# Patient Record
Sex: FEMALE | NOT HISPANIC OR LATINO | Employment: OTHER | URBAN - METROPOLITAN AREA
[De-identification: names, ages, dates, MRNs, and addresses within clinical notes are randomized per-mention and may not be internally consistent; named-entity substitution may affect disease eponyms.]

---

## 2017-01-04 ENCOUNTER — APPOINTMENT (OUTPATIENT)
Dept: GENERAL RADIOLOGY | Facility: CLINIC | Age: 67
End: 2017-01-04
Attending: EMERGENCY MEDICINE
Payer: MEDICARE

## 2017-01-04 ENCOUNTER — HOSPITAL ENCOUNTER (OUTPATIENT)
Facility: CLINIC | Age: 67
Setting detail: OBSERVATION
Discharge: HOME | End: 2017-01-06
Attending: EMERGENCY MEDICINE | Admitting: HOSPITALIST
Payer: MEDICARE

## 2017-01-04 DIAGNOSIS — I47.10 PAROXYSMAL SUPRAVENTRICULAR TACHYCARDIA (H): ICD-10-CM

## 2017-01-04 DIAGNOSIS — R91.8 PULMONARY NODULES: ICD-10-CM

## 2017-01-04 PROBLEM — R00.0 TACHYCARDIA: Status: ACTIVE | Noted: 2017-01-04

## 2017-01-04 LAB
ALBUMIN UR-MCNC: NEGATIVE MG/DL
ANION GAP SERPL CALCULATED.3IONS-SCNC: 11 MMOL/L (ref 3–14)
APPEARANCE UR: CLEAR
BACTERIA SPEC CULT: NORMAL
BASOPHILS # BLD AUTO: 0 10E9/L (ref 0–0.2)
BASOPHILS NFR BLD AUTO: 0.4 %
BILIRUB UR QL STRIP: NEGATIVE
BUN SERPL-MCNC: 15 MG/DL (ref 7–30)
CALCIUM SERPL-MCNC: 9.1 MG/DL (ref 8.5–10.1)
CHLORIDE SERPL-SCNC: 107 MMOL/L (ref 94–109)
CO2 SERPL-SCNC: 24 MMOL/L (ref 20–32)
COLOR UR AUTO: ABNORMAL
CREAT SERPL-MCNC: 0.88 MG/DL (ref 0.52–1.04)
D DIMER PPP FEU-MCNC: 0.3 UG/ML FEU (ref 0–0.5)
DIFFERENTIAL METHOD BLD: NORMAL
EOSINOPHIL # BLD AUTO: 0.1 10E9/L (ref 0–0.7)
EOSINOPHIL NFR BLD AUTO: 0.9 %
ERYTHROCYTE [DISTWIDTH] IN BLOOD BY AUTOMATED COUNT: 13.2 % (ref 10–15)
GFR SERPL CREATININE-BSD FRML MDRD: 64 ML/MIN/1.7M2
GLUCOSE SERPL-MCNC: 111 MG/DL (ref 70–99)
GLUCOSE UR STRIP-MCNC: NEGATIVE MG/DL
HCT VFR BLD AUTO: 45.6 % (ref 35–47)
HGB BLD-MCNC: 15.5 G/DL (ref 11.7–15.7)
HGB UR QL STRIP: NEGATIVE
IMM GRANULOCYTES # BLD: 0 10E9/L (ref 0–0.4)
IMM GRANULOCYTES NFR BLD: 0.3 %
INTERPRETATION ECG - MUSE: NORMAL
INTERPRETATION ECG - MUSE: NORMAL
KETONES UR STRIP-MCNC: NEGATIVE MG/DL
LEUKOCYTE ESTERASE UR QL STRIP: ABNORMAL
LYMPHOCYTES # BLD AUTO: 2.4 10E9/L (ref 0.8–5.3)
LYMPHOCYTES NFR BLD AUTO: 26.3 %
MAGNESIUM SERPL-MCNC: 2.1 MG/DL (ref 1.6–2.3)
MCH RBC QN AUTO: 30.6 PG (ref 26.5–33)
MCHC RBC AUTO-ENTMCNC: 34 G/DL (ref 31.5–36.5)
MCV RBC AUTO: 90 FL (ref 78–100)
MICRO REPORT STATUS: NORMAL
MONOCYTES # BLD AUTO: 0.4 10E9/L (ref 0–1.3)
MONOCYTES NFR BLD AUTO: 4 %
NEUTROPHILS # BLD AUTO: 6.1 10E9/L (ref 1.6–8.3)
NEUTROPHILS NFR BLD AUTO: 68.1 %
NITRATE UR QL: NEGATIVE
NRBC # BLD AUTO: 0 10*3/UL
NRBC BLD AUTO-RTO: 0 /100
NT-PROBNP SERPL-MCNC: 151 PG/ML (ref 0–900)
PH UR STRIP: 7 PH (ref 5–7)
PLATELET # BLD AUTO: 309 10E9/L (ref 150–450)
POTASSIUM SERPL-SCNC: 3.7 MMOL/L (ref 3.4–5.3)
RBC # BLD AUTO: 5.06 10E12/L (ref 3.8–5.2)
RBC #/AREA URNS AUTO: 3 /HPF (ref 0–2)
SODIUM SERPL-SCNC: 142 MMOL/L (ref 133–144)
SP GR UR STRIP: 1 (ref 1–1.03)
SPECIMEN SOURCE: NORMAL
SQUAMOUS #/AREA URNS AUTO: <1 /HPF (ref 0–1)
TROPONIN I SERPL-MCNC: NORMAL UG/L (ref 0–0.04)
TROPONIN I SERPL-MCNC: NORMAL UG/L (ref 0–0.04)
TSH SERPL DL<=0.05 MIU/L-ACNC: 1.2 MU/L (ref 0.4–4)
URN SPEC COLLECT METH UR: ABNORMAL
UROBILINOGEN UR STRIP-MCNC: NORMAL MG/DL (ref 0–2)
WBC # BLD AUTO: 9 10E9/L (ref 4–11)
WBC #/AREA URNS AUTO: 8 /HPF (ref 0–2)

## 2017-01-04 PROCEDURE — 84443 ASSAY THYROID STIM HORMONE: CPT | Performed by: EMERGENCY MEDICINE

## 2017-01-04 PROCEDURE — 83880 ASSAY OF NATRIURETIC PEPTIDE: CPT | Performed by: EMERGENCY MEDICINE

## 2017-01-04 PROCEDURE — 25000128 H RX IP 250 OP 636: Performed by: EMERGENCY MEDICINE

## 2017-01-04 PROCEDURE — 83735 ASSAY OF MAGNESIUM: CPT | Performed by: EMERGENCY MEDICINE

## 2017-01-04 PROCEDURE — 81001 URINALYSIS AUTO W/SCOPE: CPT | Performed by: EMERGENCY MEDICINE

## 2017-01-04 PROCEDURE — 25000125 ZZHC RX 250: Performed by: EMERGENCY MEDICINE

## 2017-01-04 PROCEDURE — 96374 THER/PROPH/DIAG INJ IV PUSH: CPT

## 2017-01-04 PROCEDURE — 96361 HYDRATE IV INFUSION ADD-ON: CPT

## 2017-01-04 PROCEDURE — 85025 COMPLETE CBC W/AUTO DIFF WBC: CPT | Performed by: EMERGENCY MEDICINE

## 2017-01-04 PROCEDURE — G0378 HOSPITAL OBSERVATION PER HR: HCPCS

## 2017-01-04 PROCEDURE — 36415 COLL VENOUS BLD VENIPUNCTURE: CPT | Performed by: PHYSICIAN ASSISTANT

## 2017-01-04 PROCEDURE — 71020 XR CHEST 2 VW: CPT

## 2017-01-04 PROCEDURE — 80048 BASIC METABOLIC PNL TOTAL CA: CPT | Performed by: EMERGENCY MEDICINE

## 2017-01-04 PROCEDURE — 84484 ASSAY OF TROPONIN QUANT: CPT | Performed by: EMERGENCY MEDICINE

## 2017-01-04 PROCEDURE — 25000132 ZZH RX MED GY IP 250 OP 250 PS 637: Performed by: EMERGENCY MEDICINE

## 2017-01-04 PROCEDURE — 99285 EMERGENCY DEPT VISIT HI MDM: CPT | Mod: 25

## 2017-01-04 PROCEDURE — 99220 ZZC INITIAL OBSERVATION CARE,LEVL III: CPT | Performed by: HOSPITALIST

## 2017-01-04 PROCEDURE — 93005 ELECTROCARDIOGRAM TRACING: CPT

## 2017-01-04 PROCEDURE — 87086 URINE CULTURE/COLONY COUNT: CPT | Performed by: EMERGENCY MEDICINE

## 2017-01-04 PROCEDURE — 87186 SC STD MICRODIL/AGAR DIL: CPT | Performed by: EMERGENCY MEDICINE

## 2017-01-04 PROCEDURE — 85379 FIBRIN DEGRADATION QUANT: CPT | Performed by: EMERGENCY MEDICINE

## 2017-01-04 PROCEDURE — 84484 ASSAY OF TROPONIN QUANT: CPT | Performed by: PHYSICIAN ASSISTANT

## 2017-01-04 PROCEDURE — 87088 URINE BACTERIA CULTURE: CPT | Performed by: EMERGENCY MEDICINE

## 2017-01-04 RX ORDER — ACETAMINOPHEN 325 MG/1
650 TABLET ORAL EVERY 4 HOURS PRN
Status: DISCONTINUED | OUTPATIENT
Start: 2017-01-04 | End: 2017-01-06 | Stop reason: HOSPADM

## 2017-01-04 RX ORDER — ONDANSETRON 4 MG/1
4 TABLET, ORALLY DISINTEGRATING ORAL EVERY 6 HOURS PRN
Status: DISCONTINUED | OUTPATIENT
Start: 2017-01-04 | End: 2017-01-06 | Stop reason: HOSPADM

## 2017-01-04 RX ORDER — LIDOCAINE 40 MG/G
CREAM TOPICAL
Status: DISCONTINUED | OUTPATIENT
Start: 2017-01-04 | End: 2017-01-04

## 2017-01-04 RX ORDER — MULTIVITAMIN,THERAPEUTIC
1 TABLET ORAL
COMMUNITY

## 2017-01-04 RX ORDER — SODIUM CHLORIDE 9 MG/ML
1000 INJECTION, SOLUTION INTRAVENOUS CONTINUOUS
Status: DISCONTINUED | OUTPATIENT
Start: 2017-01-04 | End: 2017-01-04

## 2017-01-04 RX ORDER — ONDANSETRON 2 MG/ML
4 INJECTION INTRAMUSCULAR; INTRAVENOUS EVERY 6 HOURS PRN
Status: DISCONTINUED | OUTPATIENT
Start: 2017-01-04 | End: 2017-01-06 | Stop reason: HOSPADM

## 2017-01-04 RX ORDER — AMOXICILLIN 250 MG
1-2 CAPSULE ORAL 2 TIMES DAILY PRN
Status: DISCONTINUED | OUTPATIENT
Start: 2017-01-04 | End: 2017-01-06 | Stop reason: HOSPADM

## 2017-01-04 RX ORDER — LIDOCAINE 40 MG/G
CREAM TOPICAL
Status: DISCONTINUED | OUTPATIENT
Start: 2017-01-04 | End: 2017-01-06 | Stop reason: HOSPADM

## 2017-01-04 RX ORDER — ASPIRIN 81 MG/1
81 TABLET, CHEWABLE ORAL DAILY
Status: DISCONTINUED | OUTPATIENT
Start: 2017-01-05 | End: 2017-01-06 | Stop reason: HOSPADM

## 2017-01-04 RX ORDER — METOPROLOL TARTRATE 25 MG/1
25 TABLET, FILM COATED ORAL ONCE
Status: COMPLETED | OUTPATIENT
Start: 2017-01-04 | End: 2017-01-04

## 2017-01-04 RX ORDER — METOPROLOL SUCCINATE 25 MG/1
25 TABLET, EXTENDED RELEASE ORAL DAILY
Qty: 30 TABLET | Refills: 0 | Status: SHIPPED | OUTPATIENT
Start: 2017-01-04 | End: 2017-01-18

## 2017-01-04 RX ORDER — NALOXONE HYDROCHLORIDE 0.4 MG/ML
.1-.4 INJECTION, SOLUTION INTRAMUSCULAR; INTRAVENOUS; SUBCUTANEOUS
Status: DISCONTINUED | OUTPATIENT
Start: 2017-01-04 | End: 2017-01-06 | Stop reason: HOSPADM

## 2017-01-04 RX ADMIN — METOPROLOL TARTRATE 25 MG: 25 TABLET ORAL at 14:43

## 2017-01-04 RX ADMIN — SODIUM CHLORIDE 1000 ML: 9 INJECTION, SOLUTION INTRAVENOUS at 12:02

## 2017-01-04 RX ADMIN — METOPROLOL TARTRATE 5 MG: 5 INJECTION INTRAVENOUS at 14:45

## 2017-01-04 ASSESSMENT — ENCOUNTER SYMPTOMS
WEAKNESS: 1
LIGHT-HEADEDNESS: 1
CONFUSION: 1
DIAPHORESIS: 1
PALPITATIONS: 1

## 2017-01-04 ASSESSMENT — PAIN DESCRIPTION - DESCRIPTORS
DESCRIPTORS: PRESSURE
DESCRIPTORS: PRESSURE

## 2017-01-04 NOTE — ED NOTES
Pt in with generalized weakness, arrythmia also, denies chest pain or SOB. ABC's intact, alert and oriented X3.

## 2017-01-04 NOTE — ED NOTES
"Pt states \"I'm starting to have a sympathetic nervous system response, my pits are sweaty, my mouth is dry\".  Will inform provider.  "

## 2017-01-04 NOTE — PHARMACY-ADMISSION MEDICATION HISTORY
Admission medication history interview status for this patient is complete. See [facility] admission navigator for allergy information, prior to admission medications and immunization status.     Medication history interview source(s):Patient  Medication history resources (including written lists, pill bottles, clinic record):None  Primary pharmacy: None, only takes OTC medications and would like to explore options (price) prior to committing to a pharmacy    Changes made to PTA medication list:  Added: Aspirin 81 mg daily, vitamin D3 2000 units daily, multivitamin daily, generic probiotic 1 capsule three times weekly, acetaminophen 325 mg q4h prn, melatonin-theanine (but is not currently taking because concerned it may be contributing to cardiac symptoms -- last dose was several days ago)  Deleted: None  Changed: None    Actions taken by pharmacist (provider contacted, etc):None     Additional medication history information:None    Medication reconciliation/reorder completed by provider prior to medication history? No    For patients on insulin therapy: No (Yes/No)  Lantus/levemir/NPH/Mix 70/30 dose:  _____   in AM/PM  or twice daily   Sliding scale Novolog Y/N  If Yes, do you have a baseline novolog pre-meal dose:  ______units with meals   Patients eat three meals a day:   Y/N     Any Barriers to therapy:  cost of medications/comfortable with giving injections (if applicable)/ comfortable and confident with current diabetes regimen       Prior to Admission medications    Medication Sig Last Dose Taking? Auth Provider   metoprolol (TOPROL-XL) 25 MG 24 hr tablet Take 1 tablet (25 mg) by mouth daily  Yes Martha Michaels MD   ASPIRIN ADULT LOW STRENGTH PO Take 81 mg by mouth daily 1/4/2017 at Unknown time Yes Unknown, Entered By History   Cholecalciferol (VITAMIN D3 PO) Take 2,000 Units by mouth daily Past Week at Unknown time Yes Unknown, Entered By History   multivitamin, therapeutic (THERA-VIT) TABS tablet Take 1  tablet by mouth daily Past Week at Unknown time Yes Unknown, Entered By History   Probiotic Product (PROBIOTIC DAILY PO) Take 1 capsule by mouth three times a week Takes one capsule Sunday, Tuesday, Thursday Past Week at Unknown time Yes Unknown, Entered By History   ACETAMINOPHEN PO Take 325 mg by mouth every 4 hours as needed for pain or fever Past Week at Unknown time Yes Unknown, Entered By History   glucosamine-chondroitin 500-400 MG CAPS Take 1 capsule by mouth daily Past Week at Unknown time Yes Reported, Patient   GuaiFENesin 100 MG PACK Take 400 mg by mouth Past Week at Unknown time Yes Reported, Patient   MELATONIN-THEANINE PO Take 1 tablet by mouth At Bedtime   Unknown, Entered By History

## 2017-01-04 NOTE — ED NOTES
"Pt c/o slight headache 1.5/10. Pt also states she's cold-did explain to pt that may be due to the IV infusion. Pt states \"but that's what happened to me last night too\". Will advise provider.   "

## 2017-01-04 NOTE — ED AVS SNAPSHOT
MRN:[id number]                      After Visit Summary   1/4/2017    Yaneli Ramey    MRN: [id number]           Thank you!     Thank you for choosing [facility] for your care. Our goal is always to provide you with excellent care. Hearing back from our patients is one way we can continue to improve our services. Please take a few minutes to complete the written survey that you may receive in the mail after you visit. If you would like to speak to someone directly about your visit please contact Patient Relations at [phone]. Thank you!          Patient Information     Date Of Birth          1950        About your hospital stay     You were admitted on:  January 4, 2017 You last received care in the:  [facility] Observation Department    You were discharged on:  January 6, 2017        Reason for your hospital stay       You were admitted for dizziness and paroxsymal atrial tachycardia.  You were seen by Cardiology in consultation and started on new cardiac medications to control your heart arrythmias.                  Who to Call     For medical emergencies, please call 911.  For non-urgent questions about your medical care, please call your primary care provider or clinic, [phone]          Attending Provider     Provider    Martha Michaels MD [place], MD Jaskaran Simmons, Edgar Elizondo MD       Primary Care Provider Office Phone # Fax #    Kya Hodges [phone]       [facility] [id number] [place] [id number]        After Care Instructions     Activity       Your activity upon discharge: activity as tolerated            Diet       Follow this diet upon discharge: - resume home diet            Discharge Instructions       Your CXR on admission showed non-specific nodules. Please follow-up with your PCP to have CT scan ordered.                  Follow-up Appointments     Follow-up and recommended labs  and tests        Echo stress test ordered within one week  EP consult ordered at time of d/c  F/U with Dr. Ramirez within 2-3 weeks to review stress test and for hospital f/u.                  Your next 10 appointments already scheduled     Jan 12, 2017  1:00 PM   Ech Stress Test with [facility]   [facility] ([facility])    [address]  [facility] [id number] [phone]           1.  Please bring or wear a comfortable two-piece outfit and walking shoes. 2.  Stop eating 3 hours before the test. You may drink water or juice. 3.  Stop all caffeine 12 hours before the test. This includes coffee, tea, soda pop, chocolate and certain medicines (such as Anacin and Excederin). Also avoid decaf coffee and tea, as these contain small amounts of caffeine. 4.  No alcohol, smoking or use of other tobacco products for 12 hours before the test. 5.  Refer to your provider instructions to see if you need to stop any medications (such as beta-blockers or nitrates) for this test. 6.  For patients with diabetes: -   If you take insulin, call your diabetes care team. Ask if you should take a   dose the morning of your test. -   If you take diabetes medicine by mouth, don't take it on the morning of your test. Bring it with you to take after the test.  (If you have questions, call your diabetes care team) 7.  When you arrive, please tell us if: -   You have diabetes. -   You have taken Viagra, Cialis or Levitra in the past 48 hours. 8.  For any questions that cannot be answered, please contact the ordering physician            Jan 18, 2017 11:15 AM   EP NEW with Chadd Bernstein MD   [facility] [place] ([facility])    [address] [place] [address]  [place] [address] [phone]              Additional Services     Cardiology Eval Adult Referral       Your provider has referred you to:  [facility]: [facility] [phone]  [phone]   [email or web address]    Please be aware that coverage of these services is subject to the terms and limitations of your health insurance plan.  Call member services at your health plan with any benefit or coverage questions.      Type of Referral:  Cardiology Follow Up - saw Dr. Ramirez from Cardiology while in the hospital    Timeframe requested:  Within 1 month    Please bring the following to your appointment:  >>   Any x-rays, CTs or MRIs which have been performed.  Contact the facility where they were done to arrange for  prior to your scheduled appointment.    >>   List of current medications  >>   This referral request   >>   Any documents/labs given to you for this referral            Follow-Up with Electrophysiologist                  Follow-up Information     Follow up with Cardiology .    Why:  as scheduled        Follow up with Emergency Room.    Why:  As needed, If symptoms worsen        Follow up with Kya Hodges NP.    Specialty:  Nurse Practitioner    Why:  within 3 days for reassessment    Contact information:    [address] [address] [phone]                  Future tests that were ordered for you     Exercise Stress Echocardiogram       The supervising Cardiologist may change the type of echocardiogram requested to answer a specific clinical question based on existing protocols in the Echocardiography laboratory.    Use of contrast is at the discretion of the supervising Cardiologist.                  Pending Results     No orders found from 1/3/2017 to 1/5/2017.            Statement of Approval     Ordered          01/06/17 1111  I have reviewed and agree with all the recommendations and orders detailed in this document.   EFFECTIVE NOW     Approved and electronically signed by:  Kassandra Pickens DO             Admission Information        Provider Department  "Dept Phone    2017 Edgar Jessica MD [facility] Observation Dept [phone]      Your Vitals Were     Blood Pressure Pulse Temperature    124/77 mmHg 67 96.9  F (36.1  C) (Oral)    Respirations Height Weight    16 1.778 m (5' 10\") 84.369 kg (186 lb)    BMI (Body Mass Index) Pulse Oximetry       26.69 kg/m2 95%       MyChart Information     [employer] lets you send messages to your doctor, view your test results, renew your prescriptions, schedule appointments and more. To sign up, go to [email or web address] . Click on \"Log in\" on the left side of the screen, which will take you to the Welcome page. Then click on \"Sign up Now\" on the right side of the page.     You will be asked to enter the access code listed below, as well as some personal information. Please follow the directions to create your username and password.     Your access code is: [email or web address]  Expires: 2017  2:36 PM     Your access code will  in 90 days. If you need help or a new code, please call your [facility] clinic or [phone].        Care EveryWhere ID     This is your Care EveryWhere ID. This could be used by other organizations to access your [facility] medical records  [id number]           Review of your medicines      START taking        Dose / Directions    flecainide 100 MG tablet   Commonly known as:  TAMBOCOR   Used for:  Paroxysmal supraventricular tachycardia (H)        Dose:  100 mg   Take 1 tablet (100 mg) by mouth 2 times daily   Quantity:  60 tablet   Refills:  0       metoprolol 25 MG 24 hr tablet   Commonly known as:  TOPROL-XL        Dose:  25 mg   Take 1 tablet (25 mg) by mouth daily   Quantity:  30 tablet   Refills:  0         CONTINUE these medicines which have NOT CHANGED        Dose / Directions    ASPIRIN ADULT LOW STRENGTH PO        Dose:  81 mg   Take 81 mg by mouth daily   Refills:  0       glucosamine-chondroitin 500-400 MG Caps per capsule        Dose:  1 capsule   Take 1 capsule by mouth daily   Refills:  " 0       MELATONIN-THEANINE PO        Dose:  1 tablet   Take 1 tablet by mouth At Bedtime   Refills:  0       multivitamin, therapeutic Tabs tablet        Dose:  1 tablet   Take 1 tablet by mouth daily   Refills:  0       PROBIOTIC DAILY PO        Dose:  1 capsule   Take 1 capsule by mouth three times a week Takes one capsule Sunday, Tuesday, Thursday   Refills:  0       VITAMIN D3 PO        Dose:  2000 Units   Take 2,000 Units by mouth daily   Refills:  0         STOP taking     ACETAMINOPHEN PO           GuaiFENesin 100 MG Pack                Where to get your medicines      Some of these will need a paper prescription and others can be bought over the counter. Ask your nurse if you have questions.     Bring a paper prescription for each of these medications    - flecainide 100 MG tablet  - metoprolol 25 MG 24 hr tablet             Protect others around you: Learn how to safely use, store and throw away your medicines at www.disposemymeds.org.             Medication List: This is a list of all your medications and when to take them. Check marks below indicate your daily home schedule. Keep this list as a reference.      Medications           Morning Afternoon Evening Bedtime As Needed    ASPIRIN ADULT LOW STRENGTH PO   Take 81 mg by mouth daily   Last time this was given:  81 mg on 1/6/2017  7:47 AM                                flecainide 100 MG tablet   Commonly known as:  TAMBOCOR   Take 1 tablet (100 mg) by mouth 2 times daily   Last time this was given:  100 mg on 1/6/2017  7:48 AM                                glucosamine-chondroitin 500-400 MG Caps per capsule   Take 1 capsule by mouth daily                                MELATONIN-THEANINE PO   Take 1 tablet by mouth At Bedtime                                metoprolol 25 MG 24 hr tablet   Commonly known as:  TOPROL-XL   Take 1 tablet (25 mg) by mouth daily   Last time this was given:  25 mg on 1/6/2017  7:48 AM                                 multivitamin, therapeutic Tabs tablet   Take 1 tablet by mouth daily                                PROBIOTIC DAILY PO   Take 1 capsule by mouth three times a week Takes one capsule Sunday, Tuesday, Thursday                                VITAMIN D3 PO   Take 2,000 Units by mouth daily                                          More Information        Arrhythmia    Electrical impulses cause the normal heart to beat 60 to 100 times a minute. These impulses come from a natural pacemaker deep inside the heart muscle. Each impulse causes the heart muscle to contract. This causes the blood to flow through the heart and out to the tissues and organs of your body.  An arrhythmia is a change from the normal speed or pattern of these electrical impulses. This can cause the heart to beat too fast (tachycardia); or too slow (bradycardia); or in an unsteady pattern (irregular rhythm).  Symptoms of arrhythmias  Different people experience arrhythmias differently. Sometimes they may not have symptoms, but just notice a change in their pulse. Symptoms can include:    Fluttering feeling in the chest    Shortness of breath    Chest pain or pressure    Lightheadedness or dizziness    Fainting or almost fainting    Palpitations (the sense that your heart is fluttering or beating fast or hard or irregularly)    Tiredness, fatigue, or weakness  Causes of arrhythmias  Arrhythmias are most often due to heart disease such as:    Coronary artery disease (arteriosclerosis)    Disease of the heart valves    Enlarged heart    High blood pressure    Heart failure  Other causes of  arrhythmia include:    Certain medicines (such as asthma inhalers and decongestants)    Some herbal supplements    Cardiac stimulant drugs (such as cocaine, amphetamine, diet pills, certain decongestant cold medicines, caffeine, and nicotine)    Excessive alcohol use    Medical conditions such as thyroid disease, anemia, anxiety, and panic disorder  Arrythmias can often  be prevented. The cause and type of arrhythmia determines the best treatment. Sometimes your doctor may want to monitor your heart rate over a 24-hour period or longer. This can help identify the cause of your arrhythmia and find the best treatment. This can be done with a Holter monitor, a portable EKG recording device attached by wires to your chest. You can carry this with you as you perform your routine activities during the monitoring period.  Home care  The following guidelines will help you care for yourself at home:  1. Avoid cardiac stimulants (such as cocaine, amphetamine, diet pills, certain decongestant cold medicines, caffeine, and nicotine).  2. If you smoke, stop smoking. Contact your doctor or a local stop-smoking program for help.  3. Tell your doctor about any prescription, over-the-counter, or herbal medicines you take. These may be affecting your heart rhythm.  Follow-up care  Follow up with your health care provider or as advised by our staff. If a Holter monitor has been recommended, contact the cardiologist you have been referred to as soon as you can  the device. Other outpatient tests may also be arranged for you at that time.  Call 911  This is the fastest and safest way to get to the emergency department. The paramedics can also start treatment on the way to the hospital, if needed.  Don't wait until your symptoms are severe to call 911. Other reasons to call 911 besides chest pain include:    Chest, shoulder, arm, neck, or back pain    Shortness of breath    Feeling lightheaded, faint, or dizzy    Rapid heart beat    Slower than usual heart rate compared to your normal    Very irregular heartbeat    Angina with weakness, dizziness, heavy sweating, nausea, or vomiting    Extreme drowsiness, or confusion    Weakness of an arm or leg or one side of the face    Difficulty with speech or vision  When to seek medical care  Remember, things are not always like they are on TV. Sometimes it  "is not so obvious. You may only feel weak or just \"not right.\" If it is not clear or if you have any doubt, call for advice.    Seek help for chest pain, or it feels different from usual, even if your symptoms are mild.    Don't drive yourself. Have someone else drive. If no one can drive you, call 911.    If your doctor has given you medicines to take when you have symptoms, take them, but do not delay getting help while trying to find them.    Don't delay. Fast diagnosis and treatment can prevent or limit the amount of heart damage during a heart attack or stroke.    Don't go to your doctor's office or a clinic because they will not be able to provide all of the testing or treatment needed for this condition.    1263-1345 [employer]. [address] [place] [address]. All rights reserved. This information is not intended as a substitute for professional medical care. Always follow your healthcare professional's instructions.                Pulmonary Nodule  A pulmonary nodule is small area of abnormal tissue in the lung. It is usually found on an X-ray taken for other reasons. It is a single spot (lesion) up to about an inch in size, surrounded by normal lung tissue.   Most nodules are not cancerous (benign). However, a nodule could be an early stage of lung cancer. Or it may be a sign of cancer that has spread from another part of the body. When a nodule is found on a chest X-ray, further testing is needed to determine if it is benign or cancerous (malignant). To give your healthcare provider more information about the nodule, you may have one or more of these tests:    Comparison of a new X-ray to earlier X-rays    Chest CT scan    Bronchoscopy (a procedure that allows the healthcare provider to see the air passages inside the lung)    Needle biopsy  Test results    If your nodule is benign, continued follow-up over the next 5 years is usually advised.    If tests do not determine whether " your nodule is benign or malignant, surgery may be advised.    If tests show that the nodule is definitely malignant, surgery will probably be advised.  The best survival rates from lung cancer occur when the original tumor is small (less than 1 inch).  Follow your healthcare provider's advice on the timing of further testing. Prompt treatment gives the best chance of curing lung cancer.  Prevention  Smoking remains one of the biggest risk factors for lung cancer. If you smoke, it is essential that you quit to lower your risk of lung cancer. Talk to your healthcare provider about things that can help you quit, including medicines and support groups. See the following websites for more information:    www.smokefree.gov    www.quitnet.com  Home care  Most people with a pulmonary nodule have no symptoms. So no special home care is required. You may return to your usual activities and diet.  Follow-up care  Follow up with your healthcare provider, or as advised.  More information about lung cancer is available from these resources:    American Lung Association: 371.902.4036, www.lung.org    National [employer]: 301.654.3048, www.cancer.gov  When to seek medical advice  Call your healthcare provider right away if any of these occur:    Fever of 100.4 F (38 C) or higher    Unintended weight change  Call 911  Contact emergency services right away if any of these occur:    Coughing up blood    Chest pain or shortness of breath    5064-2047 [employer]. [address] [place] [address], [place] [address]. All rights reserved. This information is not intended as a substitute for professional medical care. Always follow your healthcare professional's instructions.

## 2017-01-04 NOTE — ED AVS SNAPSHOT
[facility] Emergency Department    [address]    [facility] [address]    Phone:  [phone]    Fax:  [phone]                                       Yaneli Ramey   MRN: [id number]    Department:  [facility] Emergency Department   Date of Visit:  1/4/2017           Patient Information     Date Of Birth          1950        Your diagnoses for this visit were:     Tachycardia     Pulmonary nodules        You were seen by Martha Michaels MD.      Follow-up Information     Follow up with Cardiology .    Why:  as scheduled        Follow up with Emergency Room.    Why:  As needed, If symptoms worsen        Follow up with Kya Hodges NP.    Specialty:  Nurse Practitioner    Why:  within 3 days for reassessment    Contact information:    [address] [address] [phone]        Discharge References/Attachments     ARRHYTHMIA, UNSPECIFIED (ENGLISH)    PULMONARY NODULE, SOLITARY (ENGLISH)      24 Hour Appointment Hotline       To make an appointment at any [facility], call [phone] ([phone]). If you don't have a family doctor or clinic, we will help you find one. [place] clinics are conveniently located to serve the needs of you and your family.             Review of your medicines      START taking        Dose / Directions Last dose taken    metoprolol 25 MG 24 hr tablet   Commonly known as:  TOPROL-XL   Dose:  25 mg   Quantity:  30 tablet        Take 1 tablet (25 mg) by mouth daily   Refills:  0          Our records show that you are taking the medicines listed below. If these are incorrect, please call your family doctor or clinic.        Dose / Directions Last dose taken    glucosamine-chondroitin 500-400 MG Caps per capsule   Dose:  1 capsule        Take 1 capsule by mouth daily   Refills:  0        GuaiFENesin 100 MG Pack   Dose:  400 mg        Take 400 mg by mouth   Refills:  0                 Prescriptions were sent or printed at these locations (1 Prescription)                   Other Prescriptions                Printed at Department/Unit printer (1 of 1)         metoprolol (TOPROL-XL) 25 MG 24 hr tablet                Procedures and tests performed during your visit     Procedure/Test Number of Times Performed    Basic metabolic panel 1    CBC with platelets differential 1    Cardiac Continuous Monitoring 1    Chest XR,  PA & LAT 1    D dimer quantitative 1    EKG 12 lead 2    Magnesium 1    Nt probnp inpatient (BNP) 1    Peripheral IV catheter 1    Pulse oximetry nursing 1    TSH 1    Troponin I 1    UA with Microscopic 1      Orders Needing Specimen Collection     None      Pending Results     Date and Time Order Name Status Description    1/4/2017 1150 EKG 12 lead Preliminary             Pending Culture Results     No orders found from 1/3/2017 to 1/5/2017.       Test Results from your hospital stay           1/4/2017 12:24 PM - Interface, eGenerations Results      Component Results     Component Value Ref Range & Units Status    WBC 9.0 4.0 - 11.0 10e9/L Final    RBC Count 5.06 3.8 - 5.2 10e12/L Final    Hemoglobin 15.5 11.7 - 15.7 g/dL Final    Hematocrit 45.6 35.0 - 47.0 % Final    MCV 90 78 - 100 fl Final    MCH 30.6 26.5 - 33.0 pg Final    MCHC 34.0 31.5 - 36.5 g/dL Final    RDW 13.2 10.0 - 15.0 % Final    Platelet Count 309 150 - 450 10e9/L Final    Diff Method Automated Method  Final    % Neutrophils 68.1 % Final    % Lymphocytes 26.3 % Final    % Monocytes 4.0 % Final    % Eosinophils 0.9 % Final    % Basophils 0.4 % Final    % Immature Granulocytes 0.3 % Final    Nucleated RBCs 0 0 /100 Final    Absolute Neutrophil 6.1 1.6 - 8.3 10e9/L Final    Absolute Lymphocytes 2.4 0.8 - 5.3 10e9/L Final    Absolute Monocytes 0.4 0.0 - 1.3 10e9/L Final    Absolute Eosinophils 0.1 0.0 - 0.7 10e9/L Final    Absolute Basophils 0.0 0.0 - 0.2 10e9/L Final    Abs Immature Granulocytes 0.0 0 - 0.4 10e9/L Final     Absolute Nucleated RBC 0.0  Final         1/4/2017 12:44 PM - Interface, Flexilab Results      Component Results     Component Value Ref Range & Units Status    Sodium 142 133 - 144 mmol/L Final    Potassium 3.7 3.4 - 5.3 mmol/L Final    Chloride 107 94 - 109 mmol/L Final    Carbon Dioxide 24 20 - 32 mmol/L Final    Anion Gap 11 3 - 14 mmol/L Final    Glucose 111 (H) 70 - 99 mg/dL Final    Urea Nitrogen 15 7 - 30 mg/dL Final    Creatinine 0.88 0.52 - 1.04 mg/dL Final    GFR Estimate 64 >60 mL/min/1.7m2 Final    Non  GFR Calc    GFR Estimate If Black 78 >60 mL/min/1.7m2 Final    African American GFR Calc    Calcium 9.1 8.5 - 10.1 mg/dL Final         1/4/2017 12:50 PM - Interface, Flexilab Results      Component Results     Component Value Ref Range & Units Status    TSH 1.20 0.40 - 4.00 mU/L Final         1/4/2017 12:50 PM - Interface, Flexilab Results      Component Results     Component Value Ref Range & Units Status    Troponin I ES  0.000 - 0.045 ug/L Final    <0.015  The 99th percentile for upper reference range is 0.045 ug/L.  Troponin values in   the range of 0.045 - 0.120 ug/L may be associated with risks of adverse   clinical events.           1/4/2017 12:50 PM - Interface, Flexilab Results      Component Results     Component Value Ref Range & Units Status    N-Terminal Pro BNP Inpatient 151 0 - 900 pg/mL Final    Reference range shown and results flagged as abnormal are suggested inpatient   cut points for confirming diagnosis if CHF in an acute setting. Establishing   a   baseline value for each individual patient is useful for follow-up. An   inpatient or emergency department NT-proPBNP <300 pg/mL effectively rules out   acute CHF, with 99% negative predictive value.  The outpatient non-acute reference range for ruling out CHF is:   0-125 pg/mL (age 18 to less than 75)   0-450 pg/mL (age 75 yrs and older)           1/4/2017  1:23 PM - Interface, Flexilab Results      Component Results      Component Value Ref Range & Units Status    Color Urine Light Yellow  Final    Appearance Urine Clear  Final    Glucose Urine Negative NEG mg/dL Final    Bilirubin Urine Negative NEG Final    Ketones Urine Negative NEG mg/dL Final    Specific Gravity Urine 1.004 1.003 - 1.035 Final    Blood Urine Negative NEG Final    pH Urine 7.0 5.0 - 7.0 pH Final    Protein Albumin Urine Negative NEG mg/dL Final    Urobilinogen mg/dL Normal 0.0 - 2.0 mg/dL Final    Nitrite Urine Negative NEG Final    Leukocyte Esterase Urine Large (A) NEG Final    Source Midstream Urine  Final    WBC Urine 8 (H) 0 - 2 /HPF Final    RBC Urine 3 (H) 0 - 2 /HPF Final    Squamous Epithelial /HPF Urine <1 0 - 1 /HPF Final         1/4/2017 12:44 PM - Interface, Flexilab Results      Component Results     Component Value Ref Range & Units Status    Magnesium 2.1 1.6 - 2.3 mg/dL Final         1/4/2017  1:12 PM - Interface, Radiant Ib      Narrative     XR CHEST 2 VW 1/4/2017 12:47 PM    COMPARISON: None.    HISTORY: Chest pain        Impression     IMPRESSION: Pulmonary nodules at the left base and at the left apex.  These could be further characterized with CT. Right lung is clear. No  pleural effusion or pneumothorax.    TONI ARAUJO         1/4/2017 12:33 PM - Interface, Flexilab Results      Component Results     Component Value Ref Range & Units Status    D Dimer 0.3 0.0 - 0.50 ug/ml FEU Final    This D-dimer assay is intended for use in conjuntion with a clinical pretest   probability assessment model to exclude pulmonary embolism (PE) and as an aid   in the diagnosis of deep venous thrombosis (DVT) in outpatients suspected of   PE   or DVT. The cut-off value is 0.5 g/mL FEU.                  Clinical Quality Measure: Blood Pressure Screening     Your blood pressure was checked while you were in the emergency department today. The last reading we obtained was  BP: (!) 134/109 mmHg . Please read the guidelines below about what these numbers mean  "and what you should do about them.  If your systolic blood pressure (the top number) is less than 120 and your diastolic blood pressure (the bottom number) is less than 80, then your blood pressure is normal. There is nothing more that you need to do about it.  If your systolic blood pressure (the top number) is 120-139 or your diastolic blood pressure (the bottom number) is 80-89, your blood pressure may be higher than it should be. You should have your blood pressure rechecked within a year by a primary care provider.  If your systolic blood pressure (the top number) is 140 or greater or your diastolic blood pressure (the bottom number) is 90 or greater, you may have high blood pressure. High blood pressure is treatable, but if left untreated over time it can put you at risk for heart attack, stroke, or kidney failure. You should have your blood pressure rechecked by a primary care provider within the next 4 weeks.  If your provider in the emergency department today gave you specific instructions to follow-up with your doctor or provider even sooner than that, you should follow that instruction and not wait for up to 4 weeks for your follow-up visit.        Thank you for choosing [facility]       Thank you for choosing [facility] for your care. Our goal is always to provide you with excellent care. Hearing back from our patients is one way we can continue to improve our services. Please take a few minutes to complete the written survey that you may receive in the mail after you visit with us. Thank you!        [facility] Information     [facility] lets you send messages to your doctor, view your test results, renew your prescriptions, schedule appointments and more. To sign up, go to [email or web address] . Click on \"Log in\" on the left side of the screen, which will take you to the Welcome page. Then click on \"Sign up Now\" on the right side of the page.     You will be asked to enter the access code listed below, as " well as some personal information. Please follow the directions to create your username and password.     Your access code is: [email or web address]  Expires: 2017  2:36 PM     Your access code will  in 90 days. If you need help or a new code, please call your [facility] clinic or [phone].        Care EveryWhere ID     This is your Care EveryWhere ID. This could be used by other organizations to access your [facility] medical records  [id number]        After Visit Summary       This is your record. Keep this with you and show to your community pharmacist(s) and doctor(s) at your next visit.

## 2017-01-04 NOTE — ED AVS SNAPSHOT
[facility] Observation Department    [address]    [facility] [address]    Phone:  [phone]                                       Yaneli Ramey   MRN: [id number]    Department:  [facility] Observation Department   Date of Visit:  1/4/2017           After Visit Summary Signature Page     I have received my discharge instructions, and my questions have been answered. I have discussed any challenges I see with this plan with the nurse or doctor.    ..........................................................................................................................................  Patient/Patient Representative Signature      ..........................................................................................................................................  Patient Representative Print Name and Relationship to Patient    ..................................................               ................................................  Date                                            Time    ..........................................................................................................................................  Reviewed by Signature/Title    ...................................................              ..............................................  Date                                                            Time

## 2017-01-04 NOTE — ED PROVIDER NOTES
Patient signed out to me by Dr. Michaels. Pt had actually already been discharged and had planned on going to [facility] to be evaluated there. Pt wanted to be seen by cardiologist for complaints concerning palpitations, but was told that she wouldn't be able to see one today. She does have an outpt appointment to be seen by one tomorrow. Pt didn't want to wait in the waiting room @ [facility] and is concerned that she does not feel like she is stable for transfer, therefore requests admission here. I called and spoke with our hospitalist who stated pt would be an observation admission and would not see cardiology until tomorrow. I verified that patient would be ok with this, and after calling her insurance company, she did decide she wanted to be admitted. Again discussed with hospitalist service who accepts pt for admission. She's in stable condition at time of admission. All questions answered to best of my ability. She's in agreement with current plan.     José Miguel Martin MD  01/04/17 4852

## 2017-01-04 NOTE — H&P
"[facility]  Observation Unit  H & P      Patient Name: Yaneli Ramey MRN# [id number]   Age: 66 year old YOB: 1950     Date of Admission:1/4/2017    Primary care provider: Kya Hodges  Date of Service: 1/4/2017         Assessment and Plan:   Yaneli Ramey is a 66 year old female with a history of Asthma - allergy induced, GERD, OA, Sinus Arrhythmia, PACs, SVT who presents to the ED today 2/2 tachycardia.     Sinus Tachycardia - symptomatic with heart rates to 120.  Hx of PACs and SVT on holter monitor 4/2016.  Normal echocardiogram with EF 55% 4/2016.  Laboratory work up revealed normal BMP, Magnesium, Troponin, TSH, CBC, DDimer and UA. Heartrate improved to 70s after lopressor and IVF.    - s/p Lopressor 25mg po in the ED this afternoon.  - monitor on telemety overnight  - Cardiology consult in am  - continue ASA 81mg daily    CODE: Full  Diet/IVF: regular  GI ppx:  none  DVT ppx: SCD    Pina URBINAC  Physician Assistant   Hospitalist Service  Pager: [phone]           Chief Complaint:   tachycardia         HPI:   66 year old female with a history of Asthma - allergy induced, GERD, OA, Sinus Arrhythmia, PACs, SVT who presents to the ED today 2/2 tachycardia.    Patient states she woke yesterday morning with light headedness and feeling like her heart was racing with mild chest pressure.  She states she has had similar symptoms in the past and she tried to \"exercise through it\" and wash her face which has worked in the past.  Patient went to a clinic yesterday and had an EKG performed which revealed sinus tachycardia with 1st degree AV block.  Patient was started on ASA 81mg daily and referred to Cardiology at [facility] on 1/5/17.  Patient awoke this morning with similar symptoms of lightheadedness, fatigue and diaphoresis with activity and some confustion.  She denies chest pain.  Patient reports she had a holter monitor placed 4/2016 and per care everywhere she had " PACs and SVT at that time.  She had an echocardiogram performed which was normal.  Patient reports no further interventions performed.  Patient recently started taking melatonin in the past two weeks.  She has had some increase consumption of caffeine recently as well.  Denies tobacco, etoh or drug use.    ED work up revealed patient tachycardic to 120 with intermittent hypertension.  EKG revealed sinus tachycardia and called it a 2nd degree AV block and another EKG was performed with sinus tachycardia.  CXR revealed pulmonary nodules at the left base, otherwise clear.  Laboratory work up revealed normal BMP, Magnesium, Troponin, TSH, CBC, DDimer and UA.  ED discussed the patient with Cardiology on call who recommended Lopressor and follow up with Cardiology as an outpatient.  Patient does not feel stable for discharge and requests admission for overnight observation.        Past Medical History:     Past Medical History   Diagnosis Date     Asthma      Arrhythmia      Gastro-oesophageal reflux disease      Pneumonia           Past Surgical History:     Past Surgical History   Procedure Laterality Date     Bunionectomy       Hysterectomy       Discectomy lumbar minimally invasive two levels       Repair hammer toe       Repair ligament ulnar collateral (elbow)       Repair ptosis bilateral  12/23/2013     Procedure: REPAIR PTOSIS BILATERAL;  right upper lid ptosis repair and bilateral upper eyelid mechanical ptosis repair;  Surgeon: Santo Choudhury MD;  Location: [facility]          Social History:     Social History     Social History     Marital Status: Single     Spouse Name: N/A     Number of Children: N/A     Years of Education: N/A     Occupational History     Not on file.     Social History Main Topics     Smoking status: Former Smoker     Smokeless tobacco: Not on file     Alcohol Use: 0.0 oz/week     0 Standard drinks or equivalent per week     Drug Use: No     Sexual Activity: Not on file     Other Topics  "Concern     Not on file     Social History Narrative          Family History:     Family History   Problem Relation Age of Onset     Emphysema Father      Coronary Artery Disease Early Onset Father      Prostate Cancer Father      Lupus Mother           Allergies:      Allergies   Allergen Reactions     Adhesive Tape      Estradiol      Percocet [Oxycodone-Acetaminophen]      Septra [Sulfamethoxazole W-Trimethoprim]           Medications:     Prior to Admission medications    Medication Sig Last Dose Taking? Auth Provider   metoprolol (TOPROL-XL) 25 MG 24 hr tablet Take 1 tablet (25 mg) by mouth daily  Yes Martha Michaels MD   ASPIRIN ADULT LOW STRENGTH PO Take 81 mg by mouth daily 1/4/2017 at Unknown time Yes Unknown, Entered By History   Cholecalciferol (VITAMIN D3 PO) Take 2,000 Units by mouth daily Past Week at Unknown time Yes Unknown, Entered By History   multivitamin, therapeutic (THERA-VIT) TABS tablet Take 1 tablet by mouth daily Past Week at Unknown time Yes Unknown, Entered By History   Probiotic Product (PROBIOTIC DAILY PO) Take 1 capsule by mouth three times a week Takes one capsule Sunday, Tuesday, Thursday Past Week at Unknown time Yes Unknown, Entered By History   ACETAMINOPHEN PO Take 325 mg by mouth every 4 hours as needed for pain or fever Past Week at Unknown time Yes Unknown, Entered By History   glucosamine-chondroitin 500-400 MG CAPS Take 1 capsule by mouth daily Past Week at Unknown time Yes Reported, Patient   GuaiFENesin 100 MG PACK Take 400 mg by mouth Past Week at Unknown time Yes Reported, Patient   MELATONIN-THEANINE PO Take 1 tablet by mouth At Bedtime   Unknown, Entered By History          Review of Systems:   A complete ROS was performed and is negative other than what is stated in the HPI.       Physical Exam:   Blood pressure 138/88, pulse 120, temperature 98.6  F (37  C), temperature source Temporal, resp. rate 20, height 1.778 m (5' 10\"), weight 84.369 kg (186 lb), SpO2 96 " "%.  General: Alert, interactive, NAD, lying in bed, pleasant  HEENT: AT/NC, sclera anicteric, PERRL, EOMI  Chest/Resp: clear to auscultation bilaterally, no crackles or wheezes  Heart/CV: regular rate and rhythm, no murmur  Abdomen/GI: Soft, nontender, nondistended. +BS.  No HSM or masses, no rebound or guarding.  Extremities/MSK: No LE edema  Skin: Warm and dry, no jaundice or rash  Neuro: Alert & oriented x 3, Cns 2-12 intact, moves all extremities equally         Labs:   ROUTINE ICU LABS (Last four results)  CMP  Recent Labs  Lab 01/04/17  1205      POTASSIUM 3.7   CHLORIDE 107   CO2 24   ANIONGAP 11   *   BUN 15   CR 0.88   GFRESTIMATED 64   GFRESTBLACK 78   HOLLY 9.1   MAG 2.1     CBC  Recent Labs  Lab 01/04/17  1205   WBC 9.0   RBC 5.06   HGB 15.5   HCT 45.6   MCV 90   MCH 30.6   MCHC 34.0   RDW 13.2             Imaging/Procedures:     Results for orders placed or performed during the hospital encounter of 01/04/17   Chest XR,  PA & LAT    Narrative    XR CHEST 2 VW 1/4/2017 12:47 PM    COMPARISON: None.    HISTORY: Chest pain      Impression    IMPRESSION: Pulmonary nodules at the left base and at the left apex.  These could be further characterized with CT. Right lung is clear. No  pleural effusion or pneumothorax.    TONI ARAUJO     4/2016 Holter Monitor:  dates of service: 4/29/16 - 4/30/16  Sinus rhythm with frequent blocked PACs.  Beats were in bradycardia (7%)     and tachycardia (50%).  Heart rates range from  bpm.      Mean HR: 93 bpm.  Max R-R: 1.8 seconds.  Very frequent PACs (92060), occasional PAC couplets (437) and frequent cycles     of PACs in bigeminy.  Also, very frequent runs of sustained and non-sustained     SVT (6672 runs totaling 81626 beats).  The longest and fastest run of SVT was     1004 beats (160 bpm).  SVT runs were undercalled.  Four PVCs.  The patient reported, \"HB feels uneven\" (1; gassy stomach) and \"mild lightheadedness,    increase in body " "temperature\" (1; gas, pre-BM).  Both symptoms correlated with NSR    (66-78 bpm), PACs and multiple runs of SVT.  CH2=V1; CH3=V3; CH1=V5/KB      4/2016 Echocardiogram:  CONCLUSION:   Irregular rhythm throughout the exam. Rhythm is indeterminate.     Normal LV size, probably normal LV function, but function is   difficult to assess due to irregular rhythm.   Normal RV size and function.   Mild to moderate pulmonic regurgitation.     No previous echo available for comparison.     Left Ventricular Ejection Fraction: 55 %               "

## 2017-01-04 NOTE — ED PROVIDER NOTES
"  History     Chief Complaint:  Tachycardia    HPI   Yaneli Ramey is a 66 year old female who presents with tachycardia. The patient reports that she began experiencing symptoms yesterday morning. She went to the gym to workout and her heart began racing, and she felt some chest pressure, which she associated with gas in her stomach. She tried to \"walk through it\", but began feeling weak and light-headed. The patient tried to slow her heart then by breathing deeply, which also did not provide much relief. She went to her clinic and had an EKG done, which she reports showed SVT. They scheduled her for a cardiology consult tomorrow at [facility].     Today, the patient began experiencing similar symptoms again. She reports tachycardia, nausea, and intense sweating. In addition, she states she has had increasing weakness. She also complains confusion, saying she is having trouble remembering details that would normally not be difficult for her to recall. She has not had recent travel. No recreational drug use. Of note, she does admit to consuming more caffeine than usual two days ago.    Cardiac Risk Factors:  CAD:    Neg  Hypertension:   Neg  Hyperlipidemia:  Neg  Diabetes:   Neg  Tobacco use:   Former smoker  Gender:   F  Age:    66  Familial Hx of CAD:  Neg    Allergies:  Estradiol  Percocet  Septra     Medications:    Glucosamine-chondroitin  Guaifenesin     Past Medical History:    Asthma  Arrhythmia  GERD  Pneumonia    Past Surgical History:    Bunionectomy  Hysterectomy  Discectomy lumbar  Repair hammer toe  Repair ulnar collateral  Ptosis bilateral     Family History:    History reviewed.  No significant family history.     Social History:  Relationship status: Single  Tobacco use: Former smoker  Alcohol use: Positive  The patient presents with her niece.      Review of Systems   Constitutional: Positive for diaphoresis.   Cardiovascular: Positive for chest pain and palpitations.   Neurological: Positive for " "weakness and light-headedness.   Psychiatric/Behavioral: Positive for confusion.   All other systems reviewed and are negative.      Physical Exam   First Vitals:  BP: (!) 158/91 mmHg  Pulse: 120  Temp: 98.6  F (37  C)  Resp: 20  Height: 177.8 cm (5' 10\")  Weight: 84.369 kg (186 lb)  SpO2: 96 %      Physical Exam     General: Adult female sitting comfortably.  Eyes: PERRL, Conjunctive within normal limits  ENT: Moist mucous membranes, oropharynx clear.   CV: Normal S1S2, no murmur, rub or gallop. No JVD. Runs of tachycardia followed by regular rate within seconds.   Resp: Clear to auscultation bilaterally, no wheezes, rales or rhonchi. Normal respiratory effort.  GI: Abdomen is soft, nontender and nondistended. No palpable masses. No rebound or guarding.  MSK: No edema. Nontender. Normal active range of motion. No calf asymmetry or tenderness to palpation.  Skin: Warm and dry. No rashes or lesions or ecchymoses on visible skin.  Neuro: Alert and oriented. Responds appropriately to all questions and commands. No focal findings appreciated. Normal muscle tone.  Psych: Normal mood and affect. Pleasant.      Emergency Department Course   ECG (11:42:58):  Indication: Tachycardia.   Rate 108 bpm. CO interval *. QRS duration 92. QT/QTc 352/471. P-R-T axes 47.   Interpretation: Sinus tachycardia with 2nd degree AV block (Mobitz I). Nonspecific ST abnormality.  Agree with computer interpretation.  Interpreted at 1150 by Dr. Michaels.     ECG (13:38:13):  Indication: Tachycardia.   Rate 105 bpm. CO interval 208. QRS duration 94. QT/QTc 328/433. P-R-T axes 47.   Interpretation: Sinus tachycardia.  Agree with computer interpretation.   Interpreted at 1340 by Dr. Michaels.     Imaging:  Radiographic findings were communicated with the patient who voiced understanding of the findings.    Chest XR, PA and LAT, per radiology:   Pulmonary nodules at the left base and at the left apex. These could be further characterized with CT. " Right lung is clear. No pleural effusion or pneumothorax.    Laboratory:  CBC: WNL (WBC 9.0, HGB 15.5, )  BMP: Glucose 111 (H), o/w WNL (Creatinine 0.88)  TSH: 1.20  1205: Troponin I: <0.015  BNP: 151  Magnesium: 2.1  D dimer: 0.3  UA: Clear, light yellow urine: Leukocyte esterase large (A), WBC/HPF 8 (H), RBC/HPF 3 (H), o/w WNL    Interventions:  1202: Normal Saline, 500 mL, IV  1443: Lopressor, 25 mg, PO    Emergency Department Course:  Nursing notes and vitals reviewed.  I performed an exam of the patient as documented above.  The above workup was undertaken.  1330: I rechecked the patient and discussed results. She denies any new symptoms.  1349: I discussed the patient with Dr. Ramirez of cardiology.    Discussed the plan with the patient who currently appears to be in sinus rhythm. Plan is discussed including follow-up with cardiology. She initially feels comfortable with this plan. She reports feeling improved.    Patient reassessed. After thinking more, she feels as though she is unsafe to be at home with the intermittent symptoms. Admission offered, but she declines, after noting she would like to be assessed by a cardiologist tonight at [facility].     Findings and plan explained to the Patient. Patient discharged home with instructions regarding supportive care, medications, and reasons to return. The importance of close follow-up was reviewed. The patient was prescribed Lopressor.       Impression & Plan      Medical Decision Making:  Yaneli Ramey is a 66 year old female who presents with concerns of palpitations associated with some diaphoresis. She has follow up scheduled for tomorrow at [facility] but became concerned after an episode of palpitations today. When she presented she was in arrhythmia of unclear type. Initially it was thought to be a mobitz 1 AV block, however, a closer assessment of the EKG did not look typical for this. Heart rate slowed over time without intervention, she was  still tachycardic, but with a sinus appearance. No thyroid abnormality. No signs of injury or ischemia on EKG. BNP and troponin are more normal. I do not suspect underlying PE. She has had no chest pain today, and I do not suspect ACS. I discussed her with Dr. Ramirez, who felt she seemed appropriate for discharge home with a dose of Lopressor and follow up with cardiology. I discussed this with the patient and she initially felt unsafe going home, then decided she would prefer to go to [facility] as her care would typically be there. I do not think she requires admission as she has urgent cardiology follow-up tomorrow, is stable from a cardiovascular standpoint, and therefore will not transfer. Her niece will drive her to the ED for assessment there. She at one point noted she felt unsafe going home, so admission was offered here with her concerns for intermittent dizziness. She declined as she preferred to go to [facility], where she will go by private vehicle. All questions answered prior to discharge.     Diagnosis:    ICD-10-CM   1. Tachycardia R00.0     Disposition:  Discharged home.    Discharge Medications:   METOPROLOL (TOPROL-XL) 25 MG 24 HR TABLET    Take 1 tablet (25 mg) by mouth daily     IDudley, am serving as a scribe on 1/4/2017 at 11:58 AM to personally document services performed by Martha Michaels MD, based on my observations and the provider's statements to me.    [facility] EMERGENCY DEPARTMENT        Martha Michaels MD  01/23/17 [id number]

## 2017-01-04 NOTE — ED NOTES
"Pt on call light asking if she will the cardiologist tonight. Pt states, \"I imagined an emergency department would have a cardiologist on staff to see pts with cardiac issues.\" RN explained that our staff is more than capable to handle critical cardiology care but typically cardiologists are staffed up stairs and work with the hospitalists. RN explained that she would be seen by a hospitalist today and if that doctor thinks a cardiology consult is needed, one would be brought into her care. Pt is concerned because she has an outpatient cardiology appointment in the morning and needs to know if she should cancel it. RN acknowledged that concern and advised she speak with the hospitalist about it. Pt thanked RN listening. No other needs or complaints at this time.  "

## 2017-01-05 LAB
ANION GAP SERPL CALCULATED.3IONS-SCNC: 10 MMOL/L (ref 3–14)
BACTERIA SPEC CULT: ABNORMAL
BUN SERPL-MCNC: 12 MG/DL (ref 7–30)
CALCIUM SERPL-MCNC: 8.7 MG/DL (ref 8.5–10.1)
CHLORIDE SERPL-SCNC: 110 MMOL/L (ref 94–109)
CO2 SERPL-SCNC: 22 MMOL/L (ref 20–32)
CREAT SERPL-MCNC: 0.7 MG/DL (ref 0.52–1.04)
GFR SERPL CREATININE-BSD FRML MDRD: 83 ML/MIN/1.7M2
GLUCOSE SERPL-MCNC: 176 MG/DL (ref 70–99)
HBA1C MFR BLD: 5.9 % (ref 4.3–6)
Lab: ABNORMAL
MAGNESIUM SERPL-MCNC: 2.1 MG/DL (ref 1.6–2.3)
MICRO REPORT STATUS: ABNORMAL
MICROORGANISM SPEC CULT: ABNORMAL
POTASSIUM SERPL-SCNC: 3.6 MMOL/L (ref 3.4–5.3)
SODIUM SERPL-SCNC: 142 MMOL/L (ref 133–144)
SPECIMEN SOURCE: ABNORMAL
TROPONIN I SERPL-MCNC: 0.02 UG/L (ref 0–0.04)

## 2017-01-05 PROCEDURE — 83036 HEMOGLOBIN GLYCOSYLATED A1C: CPT | Performed by: PHYSICIAN ASSISTANT

## 2017-01-05 PROCEDURE — 83036 HEMOGLOBIN GLYCOSYLATED A1C: CPT | Performed by: HOSPITALIST

## 2017-01-05 PROCEDURE — 99204 OFFICE O/P NEW MOD 45 MIN: CPT | Performed by: INTERNAL MEDICINE

## 2017-01-05 PROCEDURE — G0378 HOSPITAL OBSERVATION PER HR: HCPCS

## 2017-01-05 PROCEDURE — 93005 ELECTROCARDIOGRAM TRACING: CPT

## 2017-01-05 PROCEDURE — 36415 COLL VENOUS BLD VENIPUNCTURE: CPT | Performed by: PHYSICIAN ASSISTANT

## 2017-01-05 PROCEDURE — A9270 NON-COVERED ITEM OR SERVICE: HCPCS | Mod: GY | Performed by: INTERNAL MEDICINE

## 2017-01-05 PROCEDURE — 99226 ZZC SUBSEQUENT OBSERVATION CARE,LEVEL III: CPT | Performed by: HOSPITALIST

## 2017-01-05 PROCEDURE — 25000132 ZZH RX MED GY IP 250 OP 250 PS 637: Mod: GY | Performed by: PHYSICIAN ASSISTANT

## 2017-01-05 PROCEDURE — A9270 NON-COVERED ITEM OR SERVICE: HCPCS | Mod: GY | Performed by: PHYSICIAN ASSISTANT

## 2017-01-05 PROCEDURE — 25000132 ZZH RX MED GY IP 250 OP 250 PS 637: Mod: GY | Performed by: INTERNAL MEDICINE

## 2017-01-05 PROCEDURE — 40000275 ZZH STATISTIC RCP TIME EA 10 MIN

## 2017-01-05 PROCEDURE — 80048 BASIC METABOLIC PNL TOTAL CA: CPT | Performed by: PHYSICIAN ASSISTANT

## 2017-01-05 PROCEDURE — 83735 ASSAY OF MAGNESIUM: CPT | Performed by: PHYSICIAN ASSISTANT

## 2017-01-05 PROCEDURE — 93010 ELECTROCARDIOGRAM REPORT: CPT | Performed by: INTERNAL MEDICINE

## 2017-01-05 RX ORDER — FLECAINIDE ACETATE 100 MG/1
200 TABLET ORAL ONCE
Status: COMPLETED | OUTPATIENT
Start: 2017-01-05 | End: 2017-01-05

## 2017-01-05 RX ORDER — METOPROLOL SUCCINATE 25 MG/1
25 TABLET, EXTENDED RELEASE ORAL DAILY
Status: DISCONTINUED | OUTPATIENT
Start: 2017-01-05 | End: 2017-01-06

## 2017-01-05 RX ORDER — FLECAINIDE ACETATE 100 MG/1
100 TABLET ORAL EVERY 12 HOURS SCHEDULED
Status: DISCONTINUED | OUTPATIENT
Start: 2017-01-05 | End: 2017-01-06 | Stop reason: HOSPADM

## 2017-01-05 RX ADMIN — METOPROLOL SUCCINATE 25 MG: 25 TABLET, EXTENDED RELEASE ORAL at 13:29

## 2017-01-05 RX ADMIN — FLECAINIDE ACETATE 200 MG: 100 TABLET ORAL at 12:35

## 2017-01-05 RX ADMIN — ASPIRIN 81 MG 81 MG: 81 TABLET ORAL at 09:05

## 2017-01-05 NOTE — ED NOTES
OBSERVATION patient IN TIME: 1803  ROOM #227    Living Situation (if not independent, order SW consult): Home independent  Facility name:    Activity level at baseline: Ind  Activity level on admit: Ind    Is patient a falls risk? No  Falls armband on? Not applicable,   Within Arm's Reach? No.Reason not within arm's reach is: n/a  Bed alarm turned on?   Not applicable,   Personal alarm in place and turned on?   Not applicable,     Patient registered to observation; given Patient Bill of Rights; given the opportunity to ask questions about observation status and their plan of care.  Patient has been oriented to the observation room, bathroom, and call light is in place.    : Marianne, see demographics.

## 2017-01-05 NOTE — ED NOTES
"ED Notes Filed   ED Notes Filed   PRIMARY DIAGNOSIS: Chest pressure, Arrhythmia   OUTPATIENT/OBSERVATION GOALS TO BE MET BEFORE DISCHARGE:   1. Negative Serial Troponin Yes   2. Resolution of chest pain- Denies pain  3. Pain status: Denies  4. Negative stress test N/A   5. Stable vital signs Yes   6. ADLs back to baseline? Yes   7. Activity and level of assistance: Ambulating independently.   8. Barriers to discharge noted No   9.Interpretation of rhythm per telemetry tech: SR HR 77, frequent PVC's  Is patient a falls risk? No   Falls armband on? Not applicable,   Within Arm's Reach? No.Reason not within arm's reach is: n/a   Bed alarm turned on? Not applicable,   Personal alarm in place and turned on? Not applicable,   Patient is A&O x 4. Vitals stable. Denies pain now. Per tele tech, patient is SR HR 77 with frequent PVC's. Reports feeling \"so much better\" after flecainide and metoprolol. Plan for outpatient stress test in 5 days, f/u with EP and stay overnight for monitoring. Pt resting comfortably. Will continue to monitor and provide supportive cares.     "

## 2017-01-05 NOTE — ED NOTES
"PRIMARY DIAGNOSIS: Chest pressure, Arrhythmia   OUTPATIENT/OBSERVATION GOALS TO BE MET BEFORE DISCHARGE:   1. Negative Serial Troponin Yes   2. Resolution of chest pain- Reports LUQ gas pressure 1/10, heat in place, denies need for pain medication  3. Pain status: Improved with use of alternative comfort measures i.e.: heat pad   4. Negative stress test N/A   5. Stable vital signs Yes   6. ADLs back to baseline? Yes   7. Activity and level of assistance: Ambulating independently.   8. Barriers to discharge noted No   9.Interpretation of rhythm per telemetry tech: SA with frequent PAC's & PAT's  Is patient a falls risk? No   Falls armband on? Not applicable,   Within Arm's Reach? No.Reason not within arm's reach is: n/a   Bed alarm turned on? Not applicable,   Personal alarm in place and turned on? Not applicable,   Patient is A&O x 4. Vitals stable. Rates LUQ gas pressure 1/10, heat in place. Declined intervention medication. Per tele tech, patient is SA with frequent PAC's & PVC's. Reports feeling \"crummy\". Pt resting comfortably, eating breakfast. Will continue to monitor and provide supportive cares.         "

## 2017-01-05 NOTE — ED NOTES
Tele tech called at 0520 to let us know patient is in A-fib. Writer went to check on patient, patient was in the bathroom washing up.  Patient was escorted back to bed, vitals taken, BP elevated 158/87, pulse ranging from the 50's to 120's. Stat EKG ordered.

## 2017-01-05 NOTE — ED NOTES
PRIMARY DIAGNOSIS: Chest pressure, Arrhythmia  OUTPATIENT/OBSERVATION GOALS TO BE MET BEFORE DISCHARGE:    1. Negative Serial Troponin Yes  2. Resolution of chest pain- no  3/10 chest pressure  3. Pain status: Improved with use of alternative comfort measures i.e.: heat pad  4. Negative stress test N/A  5. Stable vital signs Yes  6. ADLs back to baseline?  Yes  7. Activity and level of assistance: Ambulating independently.  8. Barriers to discharge noted No  9.Interpretation of rhythm per telemetry tech: SA/SB with occasional PAC.   Is patient a falls risk? No  Falls armband on? Not applicable,    Within Arm's Reach? No.Reason not within arm's reach is: n/a  Bed alarm turned on?   Not applicable,    Personal alarm in place and turned on?   Not applicable,     Patient is A&O x 4.  VSS, rates chest pressure 3/10. Patient still reports feeling gassy. Declined intervention medication. Per tele tech, patient is SA/SB with occasional PAC's.  Denies SOB, nausea. IV site saline locked. Will continue to monitor and offer supportive cares.

## 2017-01-05 NOTE — ED NOTES
Tele tech informed RN that pt's HR was 150's-160's. Pt was up to bathroom. Vitals taken. /80, HR , O2 97%. Pt resting in bed. Denies chest pain, SOB but reports feeling lightheaded when standing. Will notify provider.

## 2017-01-05 NOTE — ED NOTES
PRIMARY DIAGNOSIS: Chest pressure, Arrhythmia  OUTPATIENT/OBSERVATION GOALS TO BE MET BEFORE DISCHARGE:    1. Negative Serial Troponin Yes  2. Resolution of chest pain- no  3/10 chest pressure  3. Pain status: Improved with use of alternative comfort measures i.e.: heat pad  4. Negative stress test N/A  5. Stable vital signs Yes  6. ADLs back to baseline?  Yes  7. Activity and level of assistance: Ambulating independently.  8. Barriers to discharge noted No  9.Interpretation of rhythm per telemetry tech: SA/SB with occasional PAC.   Is patient a falls risk? No  Falls armband on? Not applicable,    Within Arm's Reach? No.Reason not within arm's reach is: n/a  Bed alarm turned on?   Not applicable,    Personal alarm in place and turned on?   Not applicable,     Patient is A&O x 4.  VSS, rates chest pressure 3/10. Patient reports feeling gassy. Declined intervention medication, reports heat pad and walking helping. Per tele tech, patient is SA/SB with occasional PAC's.  Denies SOB, nausea. IV site saline locked. Will continue to monitor and offer supportive cares.

## 2017-01-05 NOTE — PROGRESS NOTES
[facility]    Hospitalist Progress Note    Date of Service (when I saw the patient): 01/05/2017    Assessment and Plan  Yaneli Ramey is a 66 year old female with asthma, GERD, SVT/palpitations who was admitted on 1/4/2017 with palpitations, atrial tachycardia    1. Neuro- no issues  2. CVS- atrial tachycardia, seen by Dr. Ramirez. Started Toprol XL and flecainide. Will observe overnight. Cards to set her up with outpt stress and possible EP referral  3. Pulm- CXR showed non-specific nodules- oupt CT with her PCP  4. GI- regular  5. ID- no issues  6. Renal- no issues  7. Heme/onc- no issues  8. Endo- not diabetic  9. Musculoskel- ambulating  10. Prophylaxis- start if has prolonged stay  11. Plan for DC tomorrow      Code Status: Full Code    Disposition: Expected discharge in 1 day    Edgar Jessica    Interval History  Patient states she is feeling much better now (after flecainide and metoprolol). No chest pain, sob, abdo pain, n/v/d    -Data reviewed today: I reviewed all new labs and imaging results over the last 24 hours. I personally reviewed no images or EKG's today.    Physical Exam  Temp: 97.9  F (36.6  C) Temp src: Oral BP: 128/80 mmHg Pulse: 55 Heart Rate: 72 Resp: 16 SpO2: 97 % O2 Device: None (Room air)    Filed Vitals:    01/04/17 1153   Weight: 84.369 kg (186 lb)     Vital Signs with Ranges  Temp:  [96.9  F (36.1  C)-98.3  F (36.8  C)] 97.9  F (36.6  C)  Pulse:  [55] 55  Heart Rate:  [] 72  Resp:  [12-19] 16  BP: (116-158)/() 128/80 mmHg  SpO2:  [93 %-98 %] 97 %       Constitutional: Awake, alert, cooperative, no apparent distress   Respiratory: Clear to auscultation bilaterally, no crackles or wheezing   Cardiovascular: Regular rate and rhythm, normal S1 and S2, and no murmur noted   Abdomen: Normal bowel sounds, soft, non-distended, non-tender   Skin: No rashes, no cyanosis, dry to touch   Neuro: Alert and oriented x3, no weakness, numbness, memory loss    Extremities: No edema, normal range of motion   Other(s):        All other systems: Negative     Medications       flecainide  100 mg Oral Q12H MERLENE     metoprolol  25 mg Oral Daily     sodium chloride (PF)  3 mL Intracatheter Q8H     aspirin chewable tablet 81 mg  81 mg Oral Daily       Data    Recent Labs  Lab 01/05/17  0914 01/04/17  2345 01/04/17  1930 01/04/17  1205   WBC  --   --   --  9.0   HGB  --   --   --  15.5   MCV  --   --   --  90   PLT  --   --   --  309     --   --  142   POTASSIUM 3.6  --   --  3.7   CHLORIDE 110*  --   --  107   CO2 22  --   --  24   BUN 12  --   --  15   CR 0.70  --   --  0.88   ANIONGAP 10  --   --  11   HOLLY 8.7  --   --  9.1   *  --   --  111*   TROPI  --  0.017 <0.015The 99th percentile for upper reference range is 0.045 ug/L.  Troponin values in the range of 0.045 - 0.120 ug/L may be associated with risks of adverse clinical events. <0.015The 99th percentile for upper reference range is 0.045 ug/L.  Troponin values in the range of 0.045 - 0.120 ug/L may be associated with risks of adverse clinical events.       No results found for this or any previous visit (from the past 24 hour(s)).

## 2017-01-05 NOTE — ED NOTES
PRIMARY DIAGNOSIS: Chest pressure, Arrhythmia  OUTPATIENT/OBSERVATION GOALS TO BE MET BEFORE DISCHARGE:    1. Negative Serial Troponin Yes  2. Resolution of chest pain 2/10 chest pressure  3. Pain status: Improved with use of alternative comfort measures i.e.: distraction using conversation  4. Negative stress test N/A  5. Stable vital signs Yes  6. ADLs back to baseline?  Yes  7. Activity and level of assistance: Ambulating independently.  8. Barriers to discharge noted No  9.Interpretation of rhythm per telemetry tech: SA/SB    A&O x 4.  Rates chest pressure 2/10.  Declined intervention.  PA notified.  Additional troponin ordered.  Per tele tech, patient is SA/SB 55-65.  Denies SOB, nausea.  Patient anxious about when she will see cardiology and requesting cardioversion.  Education provided about cardioversions and when they are necessary.  Education also provided about difference between stable arrhythmia vs unstable.  Patient stated understanding and appeared less anxious afterwards.  VSS.     Falls assessment done with admission note

## 2017-01-05 NOTE — CONSULTS
CARDIOLOGY CONSULTATION      REFERRING PHYSICIAN:  Hospitalist Service.      HISTORY OF PRESENT ILLNESS:  It is my pleasure to see your patient, Yaneli Ramey, who is a very pleasant 66-year-old female patient who has been complaining of palpitations.  She notices a lot of systemic issues with the dizziness, lightheadedness and also some GI symptoms.  She would notice her heart beating rapidly and beating irregularly.  This has been going on for a few weeks, but has been getting progressively worse over the last week or so.  She was in the Emergency Room yesterday, and this showed that the patient was having bursts of paroxysmal atrial tachycardia.  The Emergency Room physician called me at that stage, so I recommended that the patient be started on a beta blocker and then be seen in the clinic.  In fact, she was due to be seen in the clinic this morning.  However, she felt that the palpitations were so bad that she wanted to be seen as an inpatient.  EKG this morning showed frequent PACs.      The patient is not complaining of any chest pains or chest pressure.  She is not complaining of any exertional dyspnea.  Her cardiac enzymes were negative.  She did have an echocardiogram performed last year that was essentially normal.  This was performed at [facility].  It does appear, however, that she has been having problems with rhythm abnormalities for quite a few years, but these appear to have been getting worse over the last week or so.  It does appear that the metoprolol 25 mg alone was insufficient at suppressing the rhythm abnormality.      PAST MEDICAL HISTORY:     1. Palpitations.   2. Asthma.   3. Gastroesophageal reflux disease.     4. Past history of pneumonia.      PAST SURGICAL HISTORY:     1. Bunionectomy.   2. Hysterectomy.   3. Discectomy.   4. Hammertoe repair.   5. Repair of the ulnar collateral ligament of the elbow.   6. Repair of ptosis bilaterally.      FAMILY HISTORY:  Father had coronary  artery disease and emphysema.  Mother had systemic lupus.  Father also had prostate cancer.      SOCIAL HISTORY:  She is single.  She is a former smoker.  She has a PhD level of education.      MEDICATIONS:     1. Metoprolol tartrate 25 mg per day.   2. Aspirin 81 mg per day.      ALLERGIES:  She is allergic to adhesive tape, Estradiol, Percocet and Septra.      REVIEW OF SYSTEMS:     CONSTITUTIONAL:  She generally feels unwell.   EYES:  It was felt that she had visual problems also when she was having the rhythm abnormalities.   ENT:  Negative.   CARDIOVASCULAR:  As above.   RESPIRATORY:  As above.   GASTROINTESTINAL:  As above.   GENITOURINARY:  Nil.   MUSCULOSKELETAL:  Nil.   NEUROLOGICAL:  As above.   PSYCHIATRIC:  Nil.   ENDOCRINE:  Nil.   HEMATOLYMPHATIC:  Nil.   ALLERGY/IMMUNOLOGY:  As above.      PHYSICAL EXAMINATION:   GENERAL:  She is a pleasant female patient in no apparent distress.   VITAL SIGNS:  Her blood pressure is 124/82.  Her pulse is 81 beats per minute, sinus rhythm with frequent PACs.  Previous EKGs yesterday showed runs of paroxysmal atrial tachycardia.   HEENT:  Unremarkable.  Her pupils are equal and reacting to light.  She has normal facial symmetry.     NECK:  Her jugular venous pulse is not raised.  Her carotids are normal with no bruits.  Trachea is not deviated.   HEART:  [place] beat is not displaced.  Heart sounds 1 and 2 are normal with no murmurs.   CHEST:  Clear to percussion and auscultation.   ABDOMEN:  Grossly normal.     EXTREMITIES:  Pedal pulses are 2+.  No peripheral edema is noted.     SKIN:  No skin lesions are noted.   NEUROLOGIC:  She moves all 4 limbs appropriately with no obvious sensory or motor loss.     PSYCHIATRIC:  She is fully oriented to time, place and person with a pleasant affect.      LABORATORY DATA:  Sodium is 142, potassium is 3.6, chloride 110, BUN is 12 and creatinine is 0.7.  All troponins are normal.  Random glucose is raised at 176.  White cell count  yesterday was 9.0.  Hemoglobin was 15.5 and platelet count 309,000.  D-dimer is 0.3.      IMPRESSION:  The patient seems to be markedly symptomatic with bursts of paroxysmal atrial tachycardia.  She appears to have no structural heart disease accounting for that based upon recent echocardiography at another institution.  Also, her TSH is normal at 1.2.  She has a normal magnesium at 2.1.  Her potassium is normal, so there is no obvious electrolyte or endocrine abnormalities that would be causing the problem.      PLAN:  Firstly, beta blockers do not appear to be holding her rhythm.  She is also on the short-acting form of metoprolol and should be on the long-acting succinate form of this drug.  However, even when she had the tartrate form and was in the therapeutic dosing range, which is 12 hours, she was not getting adequate suppression of her arrhythmia.  At this stage, I think, because she is so symptomatic, an antiarrhythmic drug would be useful.  I will start her on flecainide at a loading dose of 200 mg now and then going to 100 mg twice a day.  In 5-6 days, she should then have a stress echocardiogram to determine if she develops proarrhythmia with exercise on the flecainide and to make sure that she does not have any ischemic background, although this would be unlikely with PAT.  I will keep her overnight to see how she does with the medication.        It has been a pleasure to be involved in the care of this very nice patient.         LUCA CABRERA MD, [facility]             D: 2017 13:11   T: 2017 14:07   MT: CRISTHIAN#[id number]      Name:     ROBERT POOLE   MRN:      [id number]        Account:       [id number]   :      1950           Consult Date:  2017      Document: [id number]

## 2017-01-05 NOTE — ED NOTES
"ED Notes Filed   PRIMARY DIAGNOSIS: Chest pressure, Arrhythmia   OUTPATIENT/OBSERVATION GOALS TO BE MET BEFORE DISCHARGE:   1. Negative Serial Troponin Yes   2. Resolution of chest pain- Denies pain  3. Pain status: Improved with use of alternative comfort measures i.e.: heat pad   4. Negative stress test N/A   5. Stable vital signs Yes   6. ADLs back to baseline? Yes   7. Activity and level of assistance: Ambulating independently.   8. Barriers to discharge noted No   9.Interpretation of rhythm per telemetry tech: SA with frequent PAC's & PAT's  Is patient a falls risk? No   Falls armband on? Not applicable,   Within Arm's Reach? No.Reason not within arm's reach is: n/a   Bed alarm turned on? Not applicable,   Personal alarm in place and turned on? Not applicable,   Patient is A&O x 4. Vitals stable. Denies pain now. Per tele tech, patient is SA with frequent PAC's & PVC's. Reports feeling \"crummy\". Plan for pt to start flecainide, continue metoprolol, outpatient stress test in 5 days, f/u with EP and stay overnight for monitoring. Pt resting comfortably, ordering lunch. Will continue to monitor and provide supportive cares.     "

## 2017-01-06 ENCOUNTER — DOCUMENTATION ONLY (OUTPATIENT)
Dept: CARDIOLOGY | Facility: CLINIC | Age: 67
End: 2017-01-06

## 2017-01-06 VITALS
WEIGHT: 186 LBS | BODY MASS INDEX: 26.63 KG/M2 | RESPIRATION RATE: 16 BRPM | HEIGHT: 70 IN | DIASTOLIC BLOOD PRESSURE: 89 MMHG | TEMPERATURE: 98.1 F | HEART RATE: 89 BPM | SYSTOLIC BLOOD PRESSURE: 135 MMHG | OXYGEN SATURATION: 96 %

## 2017-01-06 LAB — INTERPRETATION ECG - MUSE: NORMAL

## 2017-01-06 PROCEDURE — A9270 NON-COVERED ITEM OR SERVICE: HCPCS | Mod: GY | Performed by: INTERNAL MEDICINE

## 2017-01-06 PROCEDURE — 25000132 ZZH RX MED GY IP 250 OP 250 PS 637: Mod: GY | Performed by: INTERNAL MEDICINE

## 2017-01-06 PROCEDURE — 99217 ZZC OBSERVATION CARE DISCHARGE: CPT | Performed by: INTERNAL MEDICINE

## 2017-01-06 PROCEDURE — A9270 NON-COVERED ITEM OR SERVICE: HCPCS | Mod: GY | Performed by: PHYSICIAN ASSISTANT

## 2017-01-06 PROCEDURE — G0378 HOSPITAL OBSERVATION PER HR: HCPCS

## 2017-01-06 PROCEDURE — 99213 OFFICE O/P EST LOW 20 MIN: CPT | Performed by: INTERNAL MEDICINE

## 2017-01-06 PROCEDURE — 25000132 ZZH RX MED GY IP 250 OP 250 PS 637: Mod: GY | Performed by: PHYSICIAN ASSISTANT

## 2017-01-06 RX ORDER — METOPROLOL SUCCINATE 25 MG/1
25 TABLET, EXTENDED RELEASE ORAL DAILY
Status: DISCONTINUED | OUTPATIENT
Start: 2017-01-07 | End: 2017-01-06 | Stop reason: HOSPADM

## 2017-01-06 RX ORDER — FLECAINIDE ACETATE 100 MG/1
100 TABLET ORAL 2 TIMES DAILY
Qty: 60 TABLET | Refills: 0 | Status: SHIPPED | OUTPATIENT
Start: 2017-01-06 | End: 2017-06-28

## 2017-01-06 RX ORDER — METOPROLOL SUCCINATE 50 MG/1
50 TABLET, EXTENDED RELEASE ORAL DAILY
Status: DISCONTINUED | OUTPATIENT
Start: 2017-01-07 | End: 2017-01-06

## 2017-01-06 RX ADMIN — ASPIRIN 81 MG 81 MG: 81 TABLET ORAL at 07:47

## 2017-01-06 RX ADMIN — METOPROLOL SUCCINATE 25 MG: 25 TABLET, EXTENDED RELEASE ORAL at 07:48

## 2017-01-06 RX ADMIN — FLECAINIDE ACETATE 100 MG: 100 TABLET ORAL at 07:48

## 2017-01-06 NOTE — DISCHARGE SUMMARY
[facility]    Discharge Summary  Hospitalist    Date of Admission:  1/4/2017  Date of Discharge:  1/6/2017  Provider:  Kassandra Pickens DO  Date of Service (when I last saw the patient): 01/06/2017    Discharge Diagnoses  1.  Paroxysmal atrial tachycardia  2.  Dizziness, secondary to #1 - improved with rate control    Other medical issues:  Past Medical History   Diagnosis Date     Asthma      Arrhythmia      Gastro-oesophageal reflux disease      Pneumonia      PAC (premature atrial contraction)      SVT (supraventricular tachycardia) (H)        History of Present Illness  Yaneli Ramey is an 66 year old female who presented with dizziness and palpitations.  Please see the admission history and physical for full details.    Hospital Course  Yaneli Ramey was admitted on 1/4/2017.  The following problems were addressed during her hospitalization:    1.  Paroxysmal atrial tachycardia  She was symptomatic with this with dizziness on presentation. She was started on metoprolol XL on admission and placed on tele.  She was seen by cardiology and then started on flecainide, as well, to help control her arrythmia.  Her bursts of PAT improved on these medications and her dizziness symptoms improved by time of d/c.     It was recommended that she have an ECHO stress test in the next 5-6 days (ordered at time of d/c) and a referral was sent for an outpt EP study.  She should follow-up with cardiology within 2-3 weeks for review of test results and symptoms post-hospitalization.    2.  Pulmonary nodules  These were noted on CXR.  F/U with PCP within 2 wks for hospital f/u and chest CT scan and/or referral to the pulmonary nodule clinic at [facility].      Significant Results and Procedures      Pending Results  Unresulted Labs Ordered in the Past 30 Days of this Admission     No orders found from 11/6/2016 to 1/5/2017.          Code Status  Full Code       Primary Care Physician  Kya  Tracie    Physical Exam  Temp: 96.9  F (36.1  C) Temp src: Oral BP: 124/77 mmHg Pulse: 67 Heart Rate: 63 Resp: 16 SpO2: 95 % O2 Device: None (Room air)    Filed Vitals:    01/04/17 1153   Weight: 84.369 kg (186 lb)     Vital Signs with Ranges  Temp:  [95.7  F (35.4  C)-98  F (36.7  C)] 96.9  F (36.1  C)  Pulse:  [67] 67  Heart Rate:  [63-81] 63  Resp:  [16] 16  BP: (114-133)/(46-82) 124/77 mmHg  SpO2:  [95 %-100 %] 95 %     PT SEEN AND EXAMINED ON DAY OF D/C  GEN:  Alert, oriented x 3, comfortable, NAD.  Easily conversant  HEENT:  Normocephalic/atraumatic, PERRL, no scleral icterus, no nasal discharge,.  EXT:  No clear edema or cyanosis bilaterally. No joint synovitis noted.  No calf-tenderness or asymmetry noted.  SKIN:  Dry to touch, no rashes or jaundice noted.  PSYCH:  Mood somewhat flattened.  Maintains direct eye contact.  NEURO:  No tremors at rest.  Ambulating without any gait abnormality or unsteadiness.    Discharge Disposition  Discharged to home    Consultations This Hospital Stay  CARDIOLOGY IP CONSULT  PHARMACY DISCHARGE EDUCATION BY PHARMACIST    Time Spent on This Encounter  IKassandra, personally saw the patient today and spent less than or equal to 30 minutes discharging this patient.  Discharge Orders    Follow-Up with Electrophysiologist     Cardiology Eval Adult Referral     Discharge Instructions   Your CXR on admission showed non-specific nodules. Please follow-up with your PCP to have CT scan ordered.     Reason for your hospital stay   You were admitted for dizziness and paroxsymal atrial tachycardia.  You were seen by Cardiology in consultation and started on new cardiac medications to control your heart arrythmias.     Follow-up and recommended labs and tests    Echo stress test ordered within one week  EP consult ordered at time of d/c  F/U with Dr. Ramirez within 2-3 weeks to review stress test and for hospital f/u.     Activity   Your activity upon discharge:  activity as tolerated     Full Code     Exercise Stress Echocardiogram   The supervising Cardiologist may change the type of echocardiogram requested to answer a specific clinical question based on existing protocols in the Echocardiography laboratory.    Use of contrast is at the discretion of the supervising Cardiologist.     Diet   Follow this diet upon discharge: - resume home diet       Discharge Medications  Current Discharge Medication List      START taking these medications    Details   flecainide (TAMBOCOR) 100 MG tablet Take 1 tablet (100 mg) by mouth 2 times daily  Qty: 60 tablet, Refills: 0    Associated Diagnoses: Paroxysmal supraventricular tachycardia (H)      metoprolol (TOPROL-XL) 25 MG 24 hr tablet Take 1 tablet (25 mg) by mouth daily  Qty: 30 tablet, Refills: 0         CONTINUE these medications which have NOT CHANGED    Details   ASPIRIN ADULT LOW STRENGTH PO Take 81 mg by mouth daily      Cholecalciferol (VITAMIN D3 PO) Take 2,000 Units by mouth daily      multivitamin, therapeutic (THERA-VIT) TABS tablet Take 1 tablet by mouth daily      Probiotic Product (PROBIOTIC DAILY PO) Take 1 capsule by mouth three times a week Takes one capsule Sunday, Tuesday, Thursday      glucosamine-chondroitin 500-400 MG CAPS Take 1 capsule by mouth daily      MELATONIN-THEANINE PO Take 1 tablet by mouth At Bedtime         STOP taking these medications       ACETAMINOPHEN PO Comments:   Reason for Stopping:         GuaiFENesin 100 MG PACK Comments:   Reason for Stopping:             Allergies  Allergies   Allergen Reactions     Adhesive Tape      Estradiol      Percocet [Oxycodone-Acetaminophen]      Septra [Sulfamethoxazole W-Trimethoprim]      Data    Recent Labs  Lab 01/04/17  1205   WBC 9.0   HGB 15.5   HCT 45.6   MCV 90          Recent Labs  Lab 01/05/17  0914 01/04/17  1205    142   POTASSIUM 3.6 3.7   CHLORIDE 110* 107   CO2 22 24   ANIONGAP 10 11   * 111*   BUN 12 15   CR 0.70 0.88    GFRESTIMATED 83 64   GFRESTBLACK >90African American GFR Calc 78   HOLLY 8.7 9.1       Recent Labs  Lab 01/05/17  0914 01/04/17  1205   CR 0.70 0.88       Recent Labs  Lab 01/04/17  1205   TSH 1.20       Recent Labs  Lab 01/04/17  2345 01/04/17  1930 01/04/17  1205   TROPI 0.017 <0.015The 99th percentile for upper reference range is 0.045 ug/L.  Troponin values in the range of 0.045 - 0.120 ug/L may be associated with risks of adverse clinical events. <0.015The 99th percentile for upper reference range is 0.045 ug/L.  Troponin values in the range of 0.045 - 0.120 ug/L may be associated with risks of adverse clinical events.       Recent Labs  Lab 01/04/17  1250   COLOR Light Yellow   APPEARANCE Clear   URINEGLC Negative   URINEBILI Negative   URINEKETONE Negative   SG 1.004   UBLD Negative   URINEPH 7.0   PROTEIN Negative   NITRITE Negative   LEUKEST Large*   RBCU 3*   WBCU 8*     Results for orders placed or performed during the hospital encounter of 01/04/17   Chest XR,  PA & LAT    Narrative    XR CHEST 2 VW 1/4/2017 12:47 PM    COMPARISON: None.    HISTORY: Chest pain      Impression    IMPRESSION: Pulmonary nodules at the left base and at the left apex.  These could be further characterized with CT. Right lung is clear. No  pleural effusion or pneumothorax.    TONI ARAUJO

## 2017-01-06 NOTE — PROGRESS NOTES
"[facility] Cardiology Progress Note            Interval History:   Palpitations feel significantly better this am though they were better after the initial loading dose of 200 mg yesterday at noon. BP and HR are tolerating meds. HR too low at 60 BPM for higher dose of metoprolol.              Medications:       [START ON 1/7/2017] metoprolol  25 mg Oral Daily     flecainide  100 mg Oral Q12H MERLENE     sodium chloride (PF)  3 mL Intracatheter Q8H     aspirin chewable tablet 81 mg  81 mg Oral Daily                 Physical Exam:   Blood pressure 124/77, pulse 67, temperature 95.7  F (35.4  C), temperature source Oral, resp. rate 16, height 1.778 m (5' 10\"), weight 84.369 kg (186 lb), SpO2 95 %.  Rhythm:  Sinus with PACs (less than yesterday)   Constitutional:   awake, alert, cooperative, no apparent distress, and appears stated age     Neck:   Supple, symmetrical, trachea midline, no adenopathy, thyroid symmetric, not enlarged and no tenderness, skin normal     Lungs:   No increased work of breathing, good air exchange, clear to auscultation bilaterally, no crackles or wheezing     Cardiovascular:   No displaced apex beat, regular rate and rhythm with ectopic beats, normal S1 and S2, no murmur noted and no edema            Data:          NA      142   1/5/2017  NA      142   1/4/2017 CHLORIDE      110   1/5/2017  CHLORIDE      107   1/4/2017 BUN       12   1/5/2017  BUN       15   1/4/2017   POTASSIUM      3.6   1/5/2017  POTASSIUM      3.7   1/4/2017 CO2       22   1/5/2017  CO2       24   1/4/2017 CR     0.70   1/5/2017  CR     0.88   1/4/2017     Lab Results   Component Value Date    HGB 15.5 01/04/2017     Lab Results   Component Value Date     01/04/2017     Lab Results   Component Value Date    WBC 9.0 01/04/2017          EKG and Imaging results:    I have reviewed recent EKGs and imaging studies.         Assessment and Plan:   Assessment:   Problem List    Tachycardia  PAT    * No resolved hospital " problems. *       Plan:   Continue with present meds.  Stress echo in 1 week to check for ischemia and flecainide pro arrhythmia.  EP visit for assessment of success of antiarrhythic Rx and possibility of ablation.  I will arrange the above.   Avoid caffeinated products       Attestation:  I have reviewed today's vital signs, notes, medications, labs and imaging.  Total time: 35 minutes         Jayson Ramirez MD, MD 1/6/2017  8:45 AM

## 2017-01-06 NOTE — ED NOTES
"PRIMARY DIAGNOSIS: Chest pressure, Arrhythmia   OUTPATIENT/OBSERVATION GOALS TO BE MET BEFORE DISCHARGE:   1. Negative Serial Troponin Yes   2. Resolution of chest pain- Denies pain  3. Pain status: Denies except minor pain 1/10 in knees  4. Negative stress test N/A   5. Stable vital signs Yes   6. ADLs back to baseline? Yes   7. Activity and level of assistance: Ambulating independently.   8. Barriers to discharge noted No   Tele tech int: NSR with rate of 75  Is patient a falls risk? No   Falls armband on? Not applicable,   Within Arm's Reach? No.Reason not within arm's reach is: n/a   Bed alarm turned on? Not applicable,   Personal alarm in place and turned on? Not applicable,   Patient is A&O x 4. Vitals stable. Denies pain now. Reports feeling \"so much better\" after flecainide and metoprolol. Patient denies any heart palpitations. Plan for outpatient stress test in 5 days, f/u with EP and stay overnight for monitoring. Pt resting comfortably. Will continue to monitor and provide supportive cares.     "

## 2017-01-06 NOTE — ED NOTES
"PRIMARY DIAGNOSIS: Chest pressure, Arrhythmia   OUTPATIENT/OBSERVATION GOALS TO BE MET BEFORE DISCHARGE:   1. Negative Serial Troponin Yes   2. Resolution of chest pain- Denies pain  3. Pain status: Denies  4. Negative stress test N/A   5. Stable vital signs Yes   6. ADLs back to baseline? Yes   7. Activity and level of assistance: Ambulating independently.   8. Barriers to discharge noted No   9.Interpretation of rhythm per telemetry tech: SR/ST w/1* AVB/PVCs + PATs HR   Is patient a falls risk? No   Falls armband on? Not applicable,   Within Arm's Reach? No.Reason not within arm's reach is: n/a   Bed alarm turned on? Not applicable,   Personal alarm in place and turned on? Not applicable,   Patient is A&O x 4. Vitals stable. Denies pain now. Reports feeling \"so much better\" after flecainide and metoprolol. Plan for outpatient stress test in 5 days, f/u with EP and stay overnight for monitoring. Pt resting comfortably. Will continue to monitor and provide supportive cares.     "

## 2017-01-12 ENCOUNTER — HOSPITAL ENCOUNTER (OUTPATIENT)
Dept: CARDIOLOGY | Facility: CLINIC | Age: 67
Discharge: HOME | End: 2017-01-12
Attending: INTERNAL MEDICINE | Admitting: INTERNAL MEDICINE
Payer: MEDICARE

## 2017-01-12 DIAGNOSIS — I47.10 PAROXYSMAL SUPRAVENTRICULAR TACHYCARDIA (H): ICD-10-CM

## 2017-01-12 PROCEDURE — 93350 STRESS TTE ONLY: CPT | Mod: 26 | Performed by: INTERNAL MEDICINE

## 2017-01-12 PROCEDURE — 40000264 ECHO STRESS WITH OPTISON

## 2017-01-12 PROCEDURE — 93018 CV STRESS TEST I&R ONLY: CPT | Performed by: INTERNAL MEDICINE

## 2017-01-12 PROCEDURE — 93325 DOPPLER ECHO COLOR FLOW MAPG: CPT | Mod: 26 | Performed by: INTERNAL MEDICINE

## 2017-01-12 PROCEDURE — 93016 CV STRESS TEST SUPVJ ONLY: CPT | Performed by: INTERNAL MEDICINE

## 2017-01-12 PROCEDURE — 25500064 ZZH RX 255 OP 636: Performed by: INTERNAL MEDICINE

## 2017-01-12 PROCEDURE — 93321 DOPPLER ECHO F-UP/LMTD STD: CPT | Mod: 26 | Performed by: INTERNAL MEDICINE

## 2017-01-12 RX ADMIN — HUMAN ALBUMIN MICROSPHERES AND PERFLUTREN 6 ML: 10; .22 INJECTION, SOLUTION INTRAVENOUS at 13:45

## 2017-01-18 ENCOUNTER — OFFICE VISIT (OUTPATIENT)
Dept: CARDIOLOGY | Facility: CLINIC | Age: 67
End: 2017-01-18
Attending: INTERNAL MEDICINE
Payer: COMMERCIAL

## 2017-01-18 VITALS
SYSTOLIC BLOOD PRESSURE: 108 MMHG | HEART RATE: 72 BPM | WEIGHT: 187 LBS | BODY MASS INDEX: 26.77 KG/M2 | DIASTOLIC BLOOD PRESSURE: 64 MMHG | HEIGHT: 70 IN

## 2017-01-18 DIAGNOSIS — I47.10 PAROXYSMAL SUPRAVENTRICULAR TACHYCARDIA (H): ICD-10-CM

## 2017-01-18 PROCEDURE — 99214 OFFICE O/P EST MOD 30 MIN: CPT | Mod: 25 | Performed by: INTERNAL MEDICINE

## 2017-01-18 PROCEDURE — 93000 ELECTROCARDIOGRAM COMPLETE: CPT | Performed by: INTERNAL MEDICINE

## 2017-01-18 RX ORDER — MULTIVIT-MIN/IRON/FOLIC ACID/K 18-600-40
1 CAPSULE ORAL
COMMUNITY

## 2017-01-18 NOTE — PROGRESS NOTES
HPI and Plan:   See dictation    Orders Placed This Encounter   Procedures     Follow-Up with Electrophysiologist     EKG 12-lead complete w/read - Clinics (performed today)     EKG 12-lead complete w/read (Future)- to be scheduled       Orders Placed This Encounter   Medications     aspirin 81 MG tablet     Sig: Take 81 mg by mouth daily     GLUCOSAMINE HCL-MSM PO     Sig: Take by mouth daily     Vitamin D, Cholecalciferol, 1000 UNITS CAPS     Sig: Take by mouth daily       Medications Discontinued During This Encounter   Medication Reason     ASPIRIN ADULT LOW STRENGTH PO Medication Reconciliation Clean Up     glucosamine-chondroitin 500-400 MG CAPS Medication Reconciliation Clean Up     Cholecalciferol (VITAMIN D3 PO) Medication Reconciliation Clean Up     metoprolol (TOPROL-XL) 25 MG 24 hr tablet          Encounter Diagnosis   Name Primary?     Paroxysmal supraventricular tachycardia (H)        CURRENT MEDICATIONS:  Current Outpatient Prescriptions   Medication Sig Dispense Refill     aspirin 81 MG tablet Take 81 mg by mouth daily       GLUCOSAMINE HCL-MSM PO Take by mouth daily       Vitamin D, Cholecalciferol, 1000 UNITS CAPS Take by mouth daily       flecainide (TAMBOCOR) 100 MG tablet Take 1 tablet (100 mg) by mouth 2 times daily 60 tablet 0     multivitamin, therapeutic (THERA-VIT) TABS tablet Take 1 tablet by mouth daily       Probiotic Product (PROBIOTIC DAILY PO) Take 1 capsule by mouth three times a week Takes one capsule Sunday, Tuesday, Thursday       MELATONIN-THEANINE PO Take 1 tablet by mouth At Bedtime         ALLERGIES     Allergies   Allergen Reactions     Adhesive Tape      Estradiol      Percocet [Oxycodone-Acetaminophen]      Septra [Sulfamethoxazole W-Trimethoprim]        PAST MEDICAL HISTORY:  Past Medical History   Diagnosis Date     Asthma      Gastro-oesophageal reflux disease      Pneumonia      SVT (supraventricular tachycardia) (H)        PAST SURGICAL HISTORY:  Past Surgical History  "  Procedure Laterality Date     Bunionectomy       Hysterectomy       Discectomy lumbar minimally invasive two levels       Repair hammer toe       Repair ligament ulnar collateral (elbow)       Repair ptosis bilateral  12/23/2013     Procedure: REPAIR PTOSIS BILATERAL;  right upper lid ptosis repair and bilateral upper eyelid mechanical ptosis repair;  Surgeon: Santo Choudhury MD;  Location[facility]       FAMILY HISTORY:  Family History   Problem Relation Age of Onset     Emphysema Father      Coronary Artery Disease Early Onset Father      Prostate Cancer Father      Lupus Mother        SOCIAL HISTORY:  Social History     Social History     Marital Status: Single     Spouse Name: N/A     Number of Children: N/A     Years of Education: N/A     Social History Main Topics     Smoking status: Former Smoker     Smokeless tobacco: None     Alcohol Use: 0.0 oz/week     0 Standard drinks or equivalent per week      Comment: min.     Drug Use: No     Sexual Activity: Not Asked     Other Topics Concern     Special Diet No     Exercise No     Social History Narrative       Review of Systems:  Skin:  Positive for rash     Eyes:  Positive for glasses    ENT:  Negative      Respiratory:  Positive for shortness of breath;dyspnea on exertion     Cardiovascular:    Positive for;fatigue    Gastroenterology: Negative      Genitourinary:  Negative      Musculoskeletal:  Negative      Neurologic:  Negative      Psychiatric:  Negative      Heme/Lymph/Imm:  Positive for allergies    Endocrine:  Negative        Physical Exam:  Vitals: /64 mmHg  Pulse 72  Ht 1.778 m (5' 10\")  Wt 84.823 kg (187 lb)  BMI 26.83 kg/m2    Constitutional:  cooperative, alert and oriented, well developed, well nourished, in no acute distress        Skin:  warm and dry to the touch, no apparent skin lesions or masses noted        Head:  normocephalic, no masses or lesions        Eyes:  pupils equal and round, conjunctivae and lids unremarkable, " sclera white, no xanthalasma, EOMS intact, no nystagmus        ENT:  no pallor or cyanosis, dentition good        Neck:  carotid pulses are full and equal bilaterally, JVP normal, no carotid bruit, no thyromegaly        Chest:  normal breath sounds, clear to auscultation, normal A-P diameter, normal symmetry, normal respiratory excursion, no use of accessory muscles          Cardiac: regular rhythm, normal S1/S2, no S3 or S4, apical impulse not displaced, no murmurs, gallops or rubs                  Abdomen:  abdomen soft, non-tender, BS normoactive, no mass, no HSM, no bruits        Vascular: pulses full and equal, no bruits auscultated                                        Extremities and Back:  no deformities, clubbing, cyanosis, erythema observed              Neurological:  affect appropriate, oriented to time, person and place              CC  Jayson Ramirez MD   [facility]  [id number]  [place] [id number]

## 2017-01-18 NOTE — Clinical Note
2017      Kya Hodges MD   [facility]   [address] [place]  [address]       RE: Yaneli Ramey   MRN: [id number]   : 1950      Dear Dr. Hodges:      I saw Ms. Ramey for evaluation of SVT.  She is a 66-year-old white female who has had recurrent palpitations for years.  She initially thought she was having PVCs and did not seek any medical attention.  On , she had lightheadedness in association with her palpitations.  She went to the ER at [facility] and was found to have frequent runs of SVT.  She was admitted briefly and was evaluated by Dr. Jayson Ramirez.  She had an unremarkable stress test.  The patient was put on metoprolol 25 mg p.o. b.i.d. and flecainide 100 mg p.o. b.i.d.  Since the discharge from the hospital, she has not felt recurrent palpitations.  On the other hand, she complains of fatigue, sometimes quite severe.      Her past medical history is remarkable for GI reflux.  There was a suspicion of asthma but in fact she believes it was due to a mold allergy.  She otherwise does not have history of hypertension, diabetes or coronary artery disease.  She does not smoke or abuse alcohol.      PHYSICAL EXAMINATION:   VITAL SIGNS:  Blood pressure was 108/64, heart rate 72 beats per minute, body weight 187 pounds.   HEENT:  The eyes and ENT were unremarkable.   LUNGS:  Clear.   CARDIAC:  The cardiac rhythm was regular and heart sounds were normal without murmur.   ABDOMEN:  No hepatomegaly.   EXTREMITIES:  There was no pedal edema.      EKG showed normal sinus rhythm.      ASSESSMENT AND RECOMMENDATIONS:  Ms. Ramey is a 66-year-old white female who was admitted for SVT in early January.  I reviewed her EKG tracings and strongly suspect that she had nonsustained atrial tachycardia.  There were multiple episodes and therefore, I agree for flecainide therapy.  Since the arrhythmia may not be easily reproducible during EP study, I would  consider ablation as the second choice.  If she has problems with flecainide therapy, we will consider ablation.  For the time being, she will continue current dose of flecainide.  For her fatigue, I strongly suspect side effects from metoprolol.  She is asked to stop metoprolol.  The patient prefers follow up in [facility] and therefore, I will have her follow up with my other EP partner in [facility] in April.  She has been asked to cancel her appointment with the nurse practitioner next week.  Otherwise, I do not see any restriction for her diet or physical activities.      Sincerely,      Chadd Bernstein MD

## 2017-01-18 NOTE — MR AVS SNAPSHOT
After Visit Summary   1/18/2017    Yaneli Ramey    MRN: [id number]           Patient Information     Date Of Birth          1950        Visit Information        Provider Department      1/18/2017 11:15 AM Chadd Bernstein MD [facility] HEART [facility]        Today's Diagnoses     Paroxysmal supraventricular tachycardia (H)            Follow-ups after your visit        Additional Services     Follow-Up with Electrophysiologist                 Your next 10 appointments already scheduled     Jan 24, 2017  1:50 PM   Return Visit with Chuck Gordon PA-C   [facility] ([facility])    [address] [facility] [address]  [facility] [id number] [phone]              Future tests that were ordered for you today     Open Future Orders        Priority Expected Expires Ordered    Follow-Up with Electrophysiologist Routine 4/17/2017 1/18/2018 1/18/2017    EKG 12-lead complete w/read (Future)- to be scheduled Routine 4/17/2017 2/22/2018 1/18/2017            Who to contact     If you have questions or need follow up information about today's clinic visit or your schedule please contact [facility] directly at [phone].  Normal or non-critical lab and imaging results will be communicated to you by MyChart, letter or phone within 4 business days after the clinic has received the results. If you do not hear from us within 7 days, please contact the clinic through [employer] or phone. If you have a critical or abnormal lab result, we will notify you by phone as soon as possible.  Submit refill requests through Black coin or call your pharmacy and they will forward the refill request to us. Please allow 3 business days for your refill to be completed.          Additional Information About Your Visit        Wealth[employer]ge[employer] Information     Black coin lets you send messages to your doctor, view your test results,  "renew your prescriptions, schedule appointments and more. To sign up, go to [email or web address] . Click on \"Log in\" on the left side of the screen, which will take you to the Welcome page. Then click on \"Sign up Now\" on the right side of the page.     You will be asked to enter the access code listed below, as well as some personal information. Please follow the directions to create your username and password.     Your access code is: [email or web address]  Expires: 2017  2:36 PM     Your access code will  in 90 days. If you need help or a new code, please call your [place] clinic or [phone].        Care EveryWhere ID     This is your Care EveryWhere ID. This could be used by other organizations to access your [place] medical records  [id number]        Your Vitals Were     Pulse Height BMI (Body Mass Index)             72 1.778 m (5' 10\") 26.83 kg/m2          Blood Pressure from Last 3 Encounters:   17 108/64   17 135/89   13 134/78    Weight from Last 3 Encounters:   17 84.823 kg (187 lb)   17 84.369 kg (186 lb)   13 83.462 kg (184 lb)              We Performed the Following     EKG 12-lead complete w/read - Clinics (performed today)     Follow-Up with Electrophysiologist          Today's Medication Changes          These changes are accurate as of: 17 11:42 AM.  If you have any questions, ask your nurse or doctor.               Stop taking these medicines if you haven't already. Please contact your care team if you have questions.     metoprolol 25 MG 24 hr tablet   Commonly known as:  TOPROL-XL   Stopped by:  Chadd Bernstein MD                    Primary Care Provider Office Phone # Fax #    Kya Hodges [phone]       [facility] [address] [facility] [address]        Thank you!     Thank you for choosing [facility] AT [place]  for your care. Our goal is always to provide you with excellent " care. Hearing back from our patients is one way we can continue to improve our services. Please take a few minutes to complete the written survey that you may receive in the mail after your visit with us. Thank you!             Your Updated Medication List - Protect others around you: Learn how to safely use, store and throw away your medicines at www.disposemymeds.org.          This list is accurate as of: 1/18/17 11:42 AM.  Always use your most recent med list.                   Brand Name Dispense Instructions for use    aspirin 81 MG tablet      Take 81 mg by mouth daily       flecainide 100 MG tablet    TAMBOCOR    60 tablet    Take 1 tablet (100 mg) by mouth 2 times daily       GLUCOSAMINE HCL-MSM PO      Take by mouth daily       MELATONIN-THEANINE PO      Take 1 tablet by mouth At Bedtime       multivitamin, therapeutic Tabs tablet      Take 1 tablet by mouth daily       PROBIOTIC DAILY PO      Take 1 capsule by mouth three times a week Takes one capsule Sunday, Tuesday, Thursday       Vitamin D (Cholecalciferol) 1000 UNITS Caps      Take by mouth daily

## 2017-01-18 NOTE — PROGRESS NOTES
2017      Kya Hodges MD   [facility]   [address] [place]  [address]       RE: Yaneli Ramey   MRN: [id number]   : 1950      Dear Dr. Hodges:      I saw Ms. Ramey for evaluation of SVT.  She is a 66-year-old white female who has had recurrent palpitations for years.  She initially thought she was having PVCs and did not seek any medical attention.  On , she had lightheadedness in association with her palpitations.  She went to the ER at [facility] and was found to have frequent runs of SVT.  She was admitted briefly and was evaluated by Dr. Jayson Ramirez.  She had an unremarkable stress test.  The patient was put on metoprolol 25 mg p.o. b.i.d. and flecainide 100 mg p.o. b.i.d.  Since the discharge from the hospital, she has not felt recurrent palpitations.  On the other hand, she complains of fatigue, sometimes quite severe.      Her past medical history is remarkable for GI reflux.  There was a suspicion of asthma but in fact she believes it was due to a mold allergy.  She otherwise does not have history of hypertension, diabetes or coronary artery disease.  She does not smoke or abuse alcohol.      PHYSICAL EXAMINATION:   VITAL SIGNS:  Blood pressure was 108/64, heart rate 72 beats per minute, body weight 187 pounds.   HEENT:  The eyes and ENT were unremarkable.   LUNGS:  Clear.   CARDIAC:  The cardiac rhythm was regular and heart sounds were normal without murmur.   ABDOMEN:  No hepatomegaly.   EXTREMITIES:  There was no pedal edema.      EKG showed normal sinus rhythm.      ASSESSMENT AND RECOMMENDATIONS:  Ms. Ramey is a 66-year-old white female who was admitted for SVT in early January.  I reviewed her EKG tracings and strongly suspect that she had nonsustained atrial tachycardia.  There were multiple episodes and therefore, I agree for flecainide therapy.  Since the arrhythmia may not be easily reproducible during EP study, I would  consider ablation as the second choice.  If she has problems with flecainide therapy, we will consider ablation.  For the time being, she will continue current dose of flecainide.  For her fatigue, I strongly suspect side effects from metoprolol.  She is asked to stop metoprolol.  The patient prefers follow up in [facility] and therefore, I will have her follow up with my other EP partner in [facility] in April.  She has been asked to cancel her appointment with the nurse practitioner next week.  Otherwise, I do not see any restriction for her diet or physical activities.      Sincerely,      MD MELQUIADES Maxwell MD             D: 2017 11:47   T: 2017 17:01   MT: ЮЛИЯ      Name:     ROBERT POOLE   MRN:      [id number]        Account:      [id number]   :      1950           Service Date: 2017      Document: [id number]

## 2017-04-14 ENCOUNTER — PRE VISIT (OUTPATIENT)
Dept: CARDIOLOGY | Facility: CLINIC | Age: 67
End: 2017-04-14

## 2017-04-19 ENCOUNTER — OFFICE VISIT (OUTPATIENT)
Dept: CARDIOLOGY | Facility: CLINIC | Age: 67
End: 2017-04-19
Attending: INTERNAL MEDICINE
Payer: COMMERCIAL

## 2017-04-19 VITALS
DIASTOLIC BLOOD PRESSURE: 80 MMHG | SYSTOLIC BLOOD PRESSURE: 120 MMHG | HEIGHT: 70 IN | WEIGHT: 189 LBS | OXYGEN SATURATION: 97 % | BODY MASS INDEX: 27.06 KG/M2 | HEART RATE: 66 BPM

## 2017-04-19 DIAGNOSIS — I47.10 PAROXYSMAL SUPRAVENTRICULAR TACHYCARDIA (H): ICD-10-CM

## 2017-04-19 PROCEDURE — 99214 OFFICE O/P EST MOD 30 MIN: CPT | Mod: 25 | Performed by: INTERNAL MEDICINE

## 2017-04-19 PROCEDURE — 93000 ELECTROCARDIOGRAM COMPLETE: CPT | Performed by: INTERNAL MEDICINE

## 2017-04-19 NOTE — MR AVS SNAPSHOT
After Visit Summary   4/19/2017    Yaneli Ramey    MRN: [id number]           Patient Information     Date Of Birth          1950        Visit Information        Provider Department      4/19/2017 11:00 AM José Miguel Martin MD [facility] [place]        Today's Diagnoses     Paroxysmal supraventricular tachycardia (H)           Follow-ups after your visit        Additional Services     Follow-Up with Cardiac Advanced Practice Provider                 Future tests that were ordered for you today     Open Future Orders        Priority Expected Expires Ordered    Follow-Up with Cardiac Advanced Practice Provider Routine 5/2/2018 9/1/2018 4/19/2017            Who to contact     If you have questions or need follow up information about today's clinic visit or your schedule please contact [facility] directly at [phone].  Normal or non-critical lab and imaging results will be communicated to you by [employer] Reviewed[employer], letter or phone within 4 business days after the clinic has received the results. If you do not hear from us within 7 days, please contact the clinic through [employer] or phone. If you have a critical or abnormal lab result, we will notify you by phone as soon as possible.  Submit refill requests through [employer] or call your pharmacy and they will forward the refill request to us. Please allow 3 business days for your refill to be completed.          Additional Information About Your Visit        MyChart Information     [employer] gives you secure access to your electronic health record. If you see a primary care provider, you can also send messages to your care team and make appointments. If you have questions, please call your primary care clinic.  If you do not have a primary care provider, please call [phone] and they will assist you.        Care EveryWhere ID     This is your Care EveryWhere ID. This  "could be used by other organizations to access your [place] medical records  [id number]        Your Vitals Were     Pulse Height Pulse Oximetry BMI (Body Mass Index)          66 1.778 m (5' 10\") 97% 27.12 kg/m2         Blood Pressure from Last 3 Encounters:   04/19/17 120/80   01/18/17 108/64   01/06/17 135/89    Weight from Last 3 Encounters:   04/19/17 85.7 kg (189 lb)   01/18/17 84.8 kg (187 lb)   01/04/17 84.4 kg (186 lb)              We Performed the Following     EKG 12-lead complete w/read (Future)- to be scheduled     Follow-Up with Electrophysiologist        Primary Care Provider Office Phone # Fax #    Kya Hodges [phone]       [facility] [address] [facility] [address]        Thank you!     Thank you for choosing [facility] AT [place]  for your care. Our goal is always to provide you with excellent care. Hearing back from our patients is one way we can continue to improve our services. Please take a few minutes to complete the written survey that you may receive in the mail after your visit with us. Thank you!             Your Updated Medication List - Protect others around you: Learn how to safely use, store and throw away your medicines at www.disposemymeds.org.          This list is accurate as of: 4/19/17 12:46 PM.  Always use your most recent med list.                   Brand Name Dispense Instructions for use    aspirin 81 MG tablet      Take 81 mg by mouth daily       flecainide 100 MG tablet    TAMBOCOR    60 tablet    Take 1 tablet (100 mg) by mouth 2 times daily       GLUCOSAMINE HCL-MSM PO      Take by mouth daily       MELATONIN-THEANINE PO      Take 1 tablet by mouth At Bedtime       multivitamin, therapeutic Tabs tablet      Take 1 tablet by mouth daily       PROBIOTIC DAILY PO      Take 1 capsule by mouth three times a week Takes one capsule Sunday, Tuesday, Thursday       Vitamin D (Cholecalciferol) 1000 UNITS Caps "      Take by mouth daily

## 2017-04-19 NOTE — LETTER
4/19/2017    Kya Hodges NP  [facility]  [address] [place] [address]    RE: Yaneli Ramey       Dear Colleague,    It was my pleasure seeing Ms. Yaneli Ramey in follow-up of atrial tachycardia.  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  Initially she was felt to be having PVCs.  In early 01/2017, she developed lightheadedness in association with palpitation and went to the ER at [facility].  She was having frequent runs of SVT.  She was admitted to the hospital and was seen by Dr. Jayson Ramirez who started flecainide 100 mg b.i.d. and metoprolol 25 mg b.i.d.  After discharge she developed significant fatigue though her palpitation vastly improved.      She saw Dr. Bernstein on 01/18/2017.  The metoprolol was discontinued.  Dr. Bernstein felt that her SVT episodes were runs of atrial tachycardia.  He thought that continuation of flecainide was reasonable.      In coming in today, Ms. Ramey says she has felt much better after stopping metoprolol.  Her energy level is back to normal.  She had many questions regarding flecainide and various symptoms that she felt could be related to flecainide.  She mentioned discomfort in her right calf, dizziness when standing up, inability to increase her heart rate as much as she used to, very dry mouth in the morning when she wakes up, etc.      On a happier note, she has had no further episodes of lightheadedness or palpitation.  Actually her heart has been very quiet.      PHYSICAL EXAMINATION:   VITAL SIGNS:  Blood pressure 120/80, pulse 66 and regular, weight 85.7 kg, height 178 cm.   GENERAL:  She is a pleasant woman who appears healthy and in no apparent distress.   NECK:  Supple without carotid bruits.   LUNGS:  Clear.  No crackles or wheezes.   CARDIOVASCULAR:  Normal JVP, regular rhythm.  No gallop, murmur or rub.   ABDOMEN:  Soft, nontender.  Negative HJR.   EXTREMITIES:  No edema.  I cannot see  "any obvious venous varicosities.   SKIN:  No rashes.      DIAGNOSTIC STUDIES:  I have reviewed all her recent cardiac studies.     Outpatient Encounter Prescriptions as of 4/19/2017   Medication Sig Dispense Refill     aspirin 81 MG tablet Take 81 mg by mouth daily       GLUCOSAMINE HCL-MSM PO Take by mouth daily       Vitamin D, Cholecalciferol, 1000 UNITS CAPS Take by mouth daily       flecainide (TAMBOCOR) 100 MG tablet Take 1 tablet (100 mg) by mouth 2 times daily 60 tablet 0     multivitamin, therapeutic (THERA-VIT) TABS tablet Take 1 tablet by mouth daily       Probiotic Product (PROBIOTIC DAILY PO) Take 1 capsule by mouth three times a week Takes one capsule Sunday, Tuesday, Thursday       MELATONIN-THEANINE PO Take 1 tablet by mouth At Bedtime       No facility-administered encounter medications on file as of 4/19/2017.       IMPRESSION:   1.  Runs of atrial tachycardia, symptomatic (lightheadedness).  Flecainide has been effective in suppressing this arrhythmia.  The patient expressed various concerns today but I believe most are not related to flecainide or a cardiac issue.  One of the things we discussed is whether the dose of flecainide can be decreased.  I think this is reasonable as a lower dose of the medication may still be effective for her atrial ectopy and possibly cause fewer side-effects.      I have asked her to decrease flecainide to 100 mg in the morning and 50 mg in the evening for 7-10 days.  If she does well with this she can then try 50 mg b.i.d.  If 50 b.i.d. is ineffective, we can provide a new prescription for 75 mg b.i.d.      I reassured her that her current arrhythmia is not \"dangerous\", though it may be a precursor for atrial fibrillation.  There is no need for anticoagulation at present.  I see that she is on a baby aspirin and I believe this is optional.      I spent 30 minutes in the clinic with Ms. Ramey and more than 50% of the time was spent in addressing her numerous " questions.      RECOMMENDATIONS:    A.  Follow up in 1 year or sooner should she have problems or concerns.   B.  Adjust flecainide dose, as above.      It was my pleasure being involved in her care.     Sincerely,    José Miguel Martin MD     [facility]

## 2017-04-19 NOTE — PROGRESS NOTES
HPI and Plan:   See dictation    Orders Placed This Encounter   Procedures     Follow-Up with Cardiac Advanced Practice Provider       No orders of the defined types were placed in this encounter.      There are no discontinued medications.      Encounter Diagnosis   Name Primary?     Paroxysmal supraventricular tachycardia (H)        CURRENT MEDICATIONS:  Current Outpatient Prescriptions   Medication Sig Dispense Refill     aspirin 81 MG tablet Take 81 mg by mouth daily       GLUCOSAMINE HCL-MSM PO Take by mouth daily       Vitamin D, Cholecalciferol, 1000 UNITS CAPS Take by mouth daily       flecainide (TAMBOCOR) 100 MG tablet Take 1 tablet (100 mg) by mouth 2 times daily 60 tablet 0     multivitamin, therapeutic (THERA-VIT) TABS tablet Take 1 tablet by mouth daily       Probiotic Product (PROBIOTIC DAILY PO) Take 1 capsule by mouth three times a week Takes one capsule Sunday, Tuesday, Thursday       MELATONIN-THEANINE PO Take 1 tablet by mouth At Bedtime         ALLERGIES     Allergies   Allergen Reactions     Adhesive Tape      Estradiol      Percocet [Oxycodone-Acetaminophen]      Septra [Sulfamethoxazole W-Trimethoprim]        PAST MEDICAL HISTORY:  Past Medical History:   Diagnosis Date     Asthma      Gastro-oesophageal reflux disease      Paroxysmal supraventricular tachycardia (H)      Pneumonia      SVT (supraventricular tachycardia) (H)        PAST SURGICAL HISTORY:  Past Surgical History:   Procedure Laterality Date     BUNIONECTOMY       DISCECTOMY LUMBAR MINIMALLY INVASIVE TWO LEVELS       HYSTERECTOMY       REPAIR HAMMER TOE       REPAIR LIGAMENT ULNAR COLLATERAL (ELBOW)       REPAIR PTOSIS BILATERAL  12/23/2013    Procedure: REPAIR PTOSIS BILATERAL;  right upper lid ptosis repair and bilateral upper eyelid mechanical ptosis repair;  Surgeon: Santo Choudhury MD;  [facility]       FAMILY HISTORY:  Family History   Problem Relation Age of Onset     Emphysema Father      Coronary Artery  Disease Early Onset Father      Prostate Cancer Father      Lupus Mother        SOCIAL HISTORY:  Social History     Social History     Marital status: Single     Spouse name: N/A     Number of children: N/A     Years of education: N/A     Social History Main Topics     Smoking status: Former Smoker     Smokeless tobacco: None     Alcohol use 0.0 oz/week     0 Standard drinks or equivalent per week      Comment: min.     Drug use: No     Sexual activity: Not Asked     Other Topics Concern     Special Diet No     Exercise No     Social History Narrative       Review of Systems:  Skin:  Negative       Eyes:  Positive for glasses PVD lefft eye   ENT:  Positive for   when wakes up tongue feels like sand paper   Respiratory:  Positive for   physical demand is different now    Cardiovascular:    chest pain;palpitations;Positive for;lightheadedness;dizziness palp and chest pain whiule on meds, ortho static gets lightheaded and while working out before starting to sweat gets lightheaded   Gastroenterology: Positive for excessive gas or bloating    Genitourinary:  Positive for urinary frequency    Musculoskeletal:  Positive for back pain    Neurologic:  Positive for   left lower leg back side feels as if has road rash   Psychiatric:  Negative      Heme/Lymph/Imm:  Negative      Endocrine:  Negative        [id number]    CC  Kya Hodges NP  [facility]  [address] [place] [address]

## 2017-04-20 NOTE — PROGRESS NOTES
HISTORY OF PRESENT ILLNESS:    It was my pleasure seeing Ms. Yaneli Ramey in follow-up of atrial tachycardia.  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  Initially she was felt to be having PVCs.  In early 01/2017, she developed lightheadedness in association with palpitation and went to the ER at [facility].  She was having frequent runs of SVT.  She was admitted to the hospital and was seen by Dr. Jayson Ramirez who started flecainide 100 mg b.i.d. and metoprolol 25 mg b.i.d.  After discharge she developed significant fatigue though her palpitation vastly improved.      She saw Dr. Bernstein on 01/18/2017.  The metoprolol was discontinued.  Dr. Bernstein felt that her SVT episodes were runs of atrial tachycardia.  He thought that continuation of flecainide was reasonable.      In coming in today, Ms. Ramey says she has felt much better after stopping metoprolol.  Her energy level is back to normal.  She had many questions regarding flecainide and various symptoms that she felt could be related to flecainide.  She mentioned discomfort in her right calf, dizziness when standing up, inability to increase her heart rate as much as she used to, very dry mouth in the morning when she wakes up, etc.      On a happier note, she has had no further episodes of lightheadedness or palpitation.  Actually her heart has been very quiet.      PHYSICAL EXAMINATION:   VITAL SIGNS:  Blood pressure 120/80, pulse 66 and regular, weight 85.7 kg, height 178 cm.   GENERAL:  She is a pleasant woman who appears healthy and in no apparent distress.   NECK:  Supple without carotid bruits.   LUNGS:  Clear.  No crackles or wheezes.   CARDIOVASCULAR:  Normal JVP, regular rhythm.  No gallop, murmur or rub.   ABDOMEN:  Soft, nontender.  Negative HJR.   EXTREMITIES:  No edema.  I cannot see any obvious venous varicosities.   SKIN:  No rashes.      DIAGNOSTIC STUDIES:  I have reviewed all her recent  "cardiac studies.      IMPRESSION:   1.  Runs of atrial tachycardia, symptomatic (lightheadedness).  Flecainide has been effective in suppressing this arrhythmia.  The patient expressed various concerns today but I believe most are not related to flecainide or a cardiac issue.  One of the things we discussed is whether the dose of flecainide can be decreased.  I think this is reasonable as a lower dose of the medication may still be effective for her atrial ectopy and possibly cause fewer side-effects.      I have asked her to decrease flecainide to 100 mg in the morning and 50 mg in the evening for 7-10 days.  If she does well with this she can then try 50 mg b.i.d.  If 50 b.i.d. is ineffective, we can provide a new prescription for 75 mg b.i.d.      I reassured her that her current arrhythmia is not \"dangerous\", though it may be a precursor for atrial fibrillation.  There is no need for anticoagulation at present.  I see that she is on a baby aspirin and I believe this is optional.      I spent 30 minutes in the clinic with Ms. Poole and more than 50% of the time was spent in addressing her numerous questions.      RECOMMENDATIONS:    A.  Follow up in 1 year or sooner should she have problems or concerns.   B.  Adjust flecainide dose, as above.      It was my pleasure being involved in her care.        CHRIS NETTLES MD, [facility]          cc:   Kya Hodges   [facility]   [address] [place]  [address]          D: 2017 12:45   T: 2017 06:35   MT: LIONEL      Name:     ROBERT POOLE   MRN:      [id number]        Account:      [id number]   :      1950           Service Date: 2017      Document: [id number]      "

## 2017-06-28 ENCOUNTER — TELEPHONE (OUTPATIENT)
Dept: CARDIOLOGY | Facility: CLINIC | Age: 67
End: 2017-06-28

## 2017-06-28 DIAGNOSIS — I47.10 PAROXYSMAL SUPRAVENTRICULAR TACHYCARDIA (H): ICD-10-CM

## 2017-06-28 RX ORDER — FLECAINIDE ACETATE 150 MG/1
TABLET ORAL
Qty: 15 TABLET | Refills: 11 | Status: SHIPPED | OUTPATIENT
Start: 2017-06-28 | End: 2018-05-11

## 2017-06-28 RX ORDER — FLECAINIDE ACETATE 150 MG/1
TABLET ORAL
Qty: 15 TABLET | Refills: 11 | Status: SHIPPED | OUTPATIENT
Start: 2017-06-28 | End: 2017-06-28

## 2017-06-28 NOTE — TELEPHONE ENCOUNTER
"Patient calling in. She is requesting a dosage change on her Flecainide. She had reduced her dose per her conversation with Dr Martin to 50 mg BID> It had been working well until recently. She states she forgot to take it one day and feels ever since she has had more episodes of \"Fluttering\". She requested a hard copy be mailed to her as she will use up the pills she has now then switch to 75 mg BID> I did review Dr Martin last OV notes that discusses this same plan. FELY Kincaid  "

## 2018-05-02 ENCOUNTER — OFFICE VISIT (OUTPATIENT)
Dept: CARDIOLOGY | Facility: CLINIC | Age: 68
End: 2018-05-02
Attending: INTERNAL MEDICINE
Payer: COMMERCIAL

## 2018-05-02 VITALS
BODY MASS INDEX: 26.2 KG/M2 | HEIGHT: 70 IN | DIASTOLIC BLOOD PRESSURE: 80 MMHG | SYSTOLIC BLOOD PRESSURE: 114 MMHG | OXYGEN SATURATION: 94 % | WEIGHT: 183 LBS | HEART RATE: 69 BPM

## 2018-05-02 DIAGNOSIS — I47.10 PAROXYSMAL SUPRAVENTRICULAR TACHYCARDIA (H): ICD-10-CM

## 2018-05-02 PROCEDURE — 99213 OFFICE O/P EST LOW 20 MIN: CPT | Performed by: NURSE PRACTITIONER

## 2018-05-02 NOTE — LETTER
"5/2/2018    Kya Hodges NP  [facility] [address] [facility] [address]    RE: Yaneli Ramey       Dear Colleague,    I had the pleasure of seeing Yaneli Ramey in the [facility].    HPI:  Yaneli Ramey is a 67 year old female who is a patient of Dr Martin.  She presents for follow-up for atrial tachycardia which she is taking flecainide.  In 2017 she developed lightheadedness with palpitations and found to have frequent runs of SVT she was admitted and seen by Dr. Alfredo Ortiz who started flecainide 100 mg twice daily and metoprolol 25 mg twice daily.  Her palpitations improved however she had significant fatigue therefore when she saw Dr. Bernstein 1/18/17 he discontinued the metoprolol and continued flecainide.  She saw Dr. Martin 4/17 who decreased her flecainide to 50 mg twice daily, however, she had symptoms and her flecainide was increased to 75 mg twice daily.   Had        Today Yaneli Ramey presents today with many questions regarding herbs, alcohol use, exercise maximum heart rates, and checking her pulse.  Overall she is doing well she has had no palpitations except when she was active this winter and the disappeared very quickly.  She is interested in decreasing her flecainide to 50 mg twice daily and she is going to slowly do a taper I will touch base with her in 2 weeks.  She exercises regularly doing pickleball and Pilates denies any CV symptoms.  She feels at times she cannot \"change gears\" with activity but when she does she is fine.  Denies any snoring she drinks alcohol very minimally and does not use tobacco.  Denies chest pain or pressure, headaches, dizziness, syncope, angina, dyspnea at rest or with exertion, dry cough, palpitations, orthopnea, PND, abdominal pain, abdominal edema, pedal edema, or claudication.        ASSESSMENT AND PLAN    (I47.1) Paroxysmal supraventricular tachycardia (H)  She is doing well on flecainide 75 mg twice " daily she would like to reduce to 50 mg twice daily.  I will touch base with her in 2 weeks on her final dosage.  Electrolytes were reviewed from outside clinic and are normal.  Encouraged her to continue to exercise and notify us if she has any cardiovascular symptoms.  Discussed continue with blood pressure less than 130/80, moderate exercise.      Follow-up in 2 weeks with phone and then 1 year with Dr. Martin EKG    Patient expresses understanding and agreement with the plan.     I appreciate the chance to help with Yaneli JARAMILLO [place] Please let me know if you have any questions or concerns.    Cass Powell, APRN, CNP    This note was completed in part using Dragon voice recognition software. Although reviewed after completion, some word and grammatical errors may occur.    Orders Placed This Encounter   Procedures     Follow-Up with Electrophysiologist     EKG 12-lead complete w/read - Clinics (to be scheduled)     No orders of the defined types were placed in this encounter.    There are no discontinued medications.      Encounter Diagnosis   Name Primary?     Paroxysmal supraventricular tachycardia (H)        CURRENT MEDICATIONS:  Current Outpatient Prescriptions   Medication Sig Dispense Refill     aspirin 81 MG tablet Take 81 mg by mouth daily       flecainide acetate 150 MG TABS Take 1/2 pill (75 mg) twice daily 15 tablet 11     GLUCOSAMINE HCL-MSM PO Take by mouth daily       MELATONIN-THEANINE PO Take 1 tablet by mouth At Bedtime       multivitamin, therapeutic (THERA-VIT) TABS tablet Take 1 tablet by mouth daily       Probiotic Product (PROBIOTIC DAILY PO) Take 1 capsule by mouth three times a week Takes one capsule Sunday, Tuesday, Thursday       Vitamin D, Cholecalciferol, 1000 UNITS CAPS Take by mouth daily         ALLERGIES     Allergies   Allergen Reactions     Adhesive Tape      Estradiol      Percocet [Oxycodone-Acetaminophen]      Septra [Sulfamethoxazole W-Trimethoprim]        PAST MEDICAL  HISTORY:  Past Medical History:   Diagnosis Date     Asthma      Gastro-oesophageal reflux disease      Paroxysmal supraventricular tachycardia (H)      Pneumonia      SVT (supraventricular tachycardia) (H)        PAST SURGICAL HISTORY:  Past Surgical History:   Procedure Laterality Date     BUNIONECTOMY       DISCECTOMY LUMBAR MINIMALLY INVASIVE TWO LEVELS       HYSTERECTOMY       REPAIR HAMMER TOE       REPAIR LIGAMENT ULNAR COLLATERAL (ELBOW)       REPAIR PTOSIS BILATERAL  12/23/2013    Procedure: REPAIR PTOSIS BILATERAL;  right upper lid ptosis repair and bilateral upper eyelid mechanical ptosis repair;  Surgeon: Santo Choudhury MD;  Location: [facility]       FAMILY HISTORY:  Family History   Problem Relation Age of Onset     Emphysema Father      Coronary Artery Disease Early Onset Father      Prostate Cancer Father      Lupus Mother        SOCIAL HISTORY:  Social History     Social History     Marital status: Single     Spouse name: N/A     Number of children: N/A     Years of education: N/A     Social History Main Topics     Smoking status: Former Smoker     Smokeless tobacco: Never Used     Alcohol use 0.0 oz/week     0 Standard drinks or equivalent per week      Comment: min.     Drug use: No     Sexual activity: Not Asked     Other Topics Concern     Special Diet No     Exercise No     Social History Narrative       Review of Systems:  Skin:  Negative     Eyes:  Positive for glasses  ENT:  Positive for    Respiratory:  Positive for dyspnea on exertion  Cardiovascular:    lightheadedness;palpitations;Positive for  Gastroenterology: Positive for excessive gas or bloating  Genitourinary:  Positive for urinary frequency  Musculoskeletal:  Positive for back pain;neck pain  Neurologic:  Positive for numbness or tingling of feet  Psychiatric:  Negative    Heme/Lymph/Imm:  Negative    Endocrine:  Negative      Physical Exam:  Vitals: /80 (BP Location: Right arm, Patient Position: Sitting, Cuff Size: Adult  "Regular)  Pulse 69  Ht 1.778 m (5' 10\")  Wt 83 kg (183 lb)  SpO2 94%  BMI 26.26 kg/m2    Constitutional:  cooperative, alert and oriented, well developed, well nourished, in no acute distress        Skin:  warm and dry to the touch, no apparent skin lesions or masses noted        Head:  normocephalic, no masses or lesions        Eyes:  pupils equal and round, conjunctivae and lids unremarkable, sclera white, no xanthalasma, EOMS intact, no nystagmus        ENT:  no pallor or cyanosis, dentition good        Neck:  carotid pulses are full and equal bilaterally, JVP normal, no carotid bruit        Chest:  normal breath sounds, clear to auscultation, normal A-P diameter, normal symmetry, normal respiratory excursion, no use of accessory muscles        Cardiac: regular rhythm, normal S1/S2, no S3 or S4, apical impulse not displaced, no murmurs, gallops or rubs                  Abdomen:  abdomen soft        Vascular:       right radial artery;2+ right carotid artery;1+     right dorsalis pedis artery;1+     left radial artery;2+ left carotid artery;1+     left dorsalis pedis artery;1+          Extremities and Back:  no deformities, clubbing, cyanosis, erythema observed;no edema        Neurological:  no gross motor deficits          Recent Lab Results:  LIPID RESULTS:  No results found for: CHOL, HDL, LDL, TRIG, CHOLHDLRATIO    LIVER ENZYME RESULTS:  No results found for: AST, ALT    CBC RESULTS:  Lab Results   Component Value Date    WBC 9.0 01/04/2017    RBC 5.06 01/04/2017    HGB 15.5 01/04/2017    HCT 45.6 01/04/2017    MCV 90 01/04/2017    MCH 30.6 01/04/2017    MCHC 34.0 01/04/2017    RDW 13.2 01/04/2017     01/04/2017       BMP RESULTS:  Lab Results   Component Value Date     01/05/2017    POTASSIUM 3.6 01/05/2017    CHLORIDE 110 (H) 01/05/2017    CO2 22 01/05/2017    ANIONGAP 10 01/05/2017     (H) 01/05/2017    BUN 12 01/05/2017    CR 0.70 01/05/2017    GFRESTIMATED 83 01/05/2017    " GFRESTBLACK >90   GFR Calc   01/05/2017    HOLLY 8.7 01/05/2017        A1C RESULTS:  Lab Results   Component Value Date    A1C 5.9 01/05/2017       INR RESULTS:  No results found for: INR        CC  José Miguel Martin MD  [address]  [place] [id number]                  Thank you for allowing me to participate in the care of your patient.      Sincerely,     KIMBER [facility]    cc:   José Miguel Martin MD  [address]  [place] [id number]

## 2018-05-02 NOTE — PATIENT INSTRUCTIONS
Call if you questions and we will touch in 2 weeks on dosage of flecainide   You will receive all normal lab and testing results via [employer] or mail if not reviewed in clinic today. Please contact our office if you need assistance with setting up MyChart.    If you need a medication refill please contact your pharmacy. Please allow 3 business days for your refill to be completed.     As always, thank you for trusting us with your health care needs!    If you have any questions regarding your visit please contact your care team:   Cardiology  Telephone Number    Afib RNs  Maren Richardson, and Radha [phone]     Call for Electrophysiology procedure scheduling concerns  Shelbi Verduzco [phone]   Device Clinic (Pacemakers, ICDs, Loop)   RN's :      Emilie Shah Eva, GAYLE Sanz, Lien Crabtree During business hours:   [phone]

## 2018-05-02 NOTE — PROGRESS NOTES
"HPI:  Yaneli Ramey is a 67 year old female who is a patient of Dr Martin.  She presents for follow-up for atrial tachycardia which she is taking flecainide.  In 2017 she developed lightheadedness with palpitations and found to have frequent runs of SVT she was admitted and seen by Dr. Alfredo Ortiz who started flecainide 100 mg twice daily and metoprolol 25 mg twice daily.  Her palpitations improved however she had significant fatigue therefore when she saw Dr. Bernstein 1/18/17 he discontinued the metoprolol and continued flecainide.  She saw Dr. Martin 4/17 who decreased her flecainide to 50 mg twice daily, however, she had symptoms and her flecainide was increased to 75 mg twice daily.          Today Yaneli Ramey presents today with many questions regarding herbs, alcohol use, exercise maximum heart rates, and checking her pulse.  Overall she is doing well she has had no palpitations except when she was active this winter and the disappeared very quickly.  She is interested in decreasing her flecainide to 50 mg twice daily and she is going to slowly do a taper I will touch base with her in 2 weeks.  She exercises regularly doing pickleball and Pilates denies any CV symptoms.  She feels at times she cannot \"change gears\" with activity but when she does she is fine.  Denies any snoring she drinks alcohol very minimally and does not use tobacco.  Denies chest pain or pressure, headaches, dizziness, syncope, angina, dyspnea at rest or with exertion, dry cough, palpitations, orthopnea, PND, abdominal pain, abdominal edema, pedal edema, or claudication.        ASSESSMENT AND PLAN    (I47.1) Paroxysmal supraventricular tachycardia (H)  She is doing well on flecainide 75 mg twice daily she would like to reduce to 50 mg twice daily.  I will touch base with her in 2 weeks on her final dosage.  Electrolytes were reviewed from outside clinic and are normal.  Encouraged her to continue to exercise and notify us if she has any " cardiovascular symptoms.  Discussed continue with blood pressure less than 130/80, moderate exercise.  Is not on a AV node blocker due to extreme fatigue.     Follow-up in 2 weeks with phone and then 1 year with Dr. Martin EKG    Patient expresses understanding and agreement with the plan.     I appreciate the chance to help with Yaneli JARAMILLO [place] Please let me know if you have any questions or concerns.    Cass Powell, APRN, CNP    This note was completed in part using Dragon voice recognition software. Although reviewed after completion, some word and grammatical errors may occur.    Orders Placed This Encounter   Procedures     Follow-Up with Electrophysiologist     EKG 12-lead complete w/read - Clinics (to be scheduled)     No orders of the defined types were placed in this encounter.    There are no discontinued medications.      Encounter Diagnosis   Name Primary?     Paroxysmal supraventricular tachycardia (H)        CURRENT MEDICATIONS:  Current Outpatient Prescriptions   Medication Sig Dispense Refill     aspirin 81 MG tablet Take 81 mg by mouth daily       flecainide acetate 150 MG TABS Take 1/2 pill (75 mg) twice daily 15 tablet 11     GLUCOSAMINE HCL-MSM PO Take by mouth daily       MELATONIN-THEANINE PO Take 1 tablet by mouth At Bedtime       multivitamin, therapeutic (THERA-VIT) TABS tablet Take 1 tablet by mouth daily       Probiotic Product (PROBIOTIC DAILY PO) Take 1 capsule by mouth three times a week Takes one capsule Sunday, Tuesday, Thursday       Vitamin D, Cholecalciferol, 1000 UNITS CAPS Take by mouth daily         ALLERGIES     Allergies   Allergen Reactions     Adhesive Tape      Estradiol      Percocet [Oxycodone-Acetaminophen]      Septra [Sulfamethoxazole W-Trimethoprim]        PAST MEDICAL HISTORY:  Past Medical History:   Diagnosis Date     Asthma      Gastro-oesophageal reflux disease      Paroxysmal supraventricular tachycardia (H)      Pneumonia      SVT (supraventricular  "tachycardia) (H)        PAST SURGICAL HISTORY:  Past Surgical History:   Procedure Laterality Date     BUNIONECTOMY       DISCECTOMY LUMBAR MINIMALLY INVASIVE TWO LEVELS       HYSTERECTOMY       REPAIR HAMMER TOE       REPAIR LIGAMENT ULNAR COLLATERAL (ELBOW)       REPAIR PTOSIS BILATERAL  12/23/2013    Procedure: REPAIR PTOSIS BILATERAL;  right upper lid ptosis repair and bilateral upper eyelid mechanical ptosis repair;  Surgeon: Santo Choudhury MD;  Location: [facility]       FAMILY HISTORY:  Family History   Problem Relation Age of Onset     Emphysema Father      Coronary Artery Disease Early Onset Father      Prostate Cancer Father      Lupus Mother        SOCIAL HISTORY:  Social History     Social History     Marital status: Single     Spouse name: N/A     Number of children: N/A     Years of education: N/A     Social History Main Topics     Smoking status: Former Smoker     Smokeless tobacco: Never Used     Alcohol use 0.0 oz/week     0 Standard drinks or equivalent per week      Comment: min.     Drug use: No     Sexual activity: Not Asked     Other Topics Concern     Special Diet No     Exercise No     Social History Narrative       Review of Systems:  Skin:  Negative     Eyes:  Positive for glasses  ENT:  Positive for    Respiratory:  Positive for dyspnea on exertion  Cardiovascular:    lightheadedness;palpitations;Positive for  Gastroenterology: Positive for excessive gas or bloating  Genitourinary:  Positive for urinary frequency  Musculoskeletal:  Positive for back pain;neck pain  Neurologic:  Positive for numbness or tingling of feet  Psychiatric:  Negative    Heme/Lymph/Imm:  Negative    Endocrine:  Negative      Physical Exam:  Vitals: /80 (BP Location: Right arm, Patient Position: Sitting, Cuff Size: Adult Regular)  Pulse 69  Ht 1.778 m (5' 10\")  Wt 83 kg (183 lb)  SpO2 94%  BMI 26.26 kg/m2    Constitutional:  cooperative, alert and oriented, well developed, well nourished, in no acute " distress        Skin:  warm and dry to the touch, no apparent skin lesions or masses noted        Head:  normocephalic, no masses or lesions        Eyes:  pupils equal and round, conjunctivae and lids unremarkable, sclera white, no xanthalasma, EOMS intact, no nystagmus        ENT:  no pallor or cyanosis, dentition good        Neck:  carotid pulses are full and equal bilaterally, JVP normal, no carotid bruit        Chest:  normal breath sounds, clear to auscultation, normal A-P diameter, normal symmetry, normal respiratory excursion, no use of accessory muscles        Cardiac: regular rhythm, normal S1/S2, no S3 or S4, apical impulse not displaced, no murmurs, gallops or rubs                  Abdomen:  abdomen soft        Vascular:       right radial artery;2+ right carotid artery;1+     right dorsalis pedis artery;1+     left radial artery;2+ left carotid artery;1+     left dorsalis pedis artery;1+          Extremities and Back:  no deformities, clubbing, cyanosis, erythema observed;no edema        Neurological:  no gross motor deficits          Recent Lab Results:  LIPID RESULTS:  No results found for: CHOL, HDL, LDL, TRIG, CHOLHDLRATIO    LIVER ENZYME RESULTS:  No results found for: AST, ALT    CBC RESULTS:  Lab Results   Component Value Date    WBC 9.0 01/04/2017    RBC 5.06 01/04/2017    HGB 15.5 01/04/2017    HCT 45.6 01/04/2017    MCV 90 01/04/2017    MCH 30.6 01/04/2017    MCHC 34.0 01/04/2017    RDW 13.2 01/04/2017     01/04/2017       BMP RESULTS:  Lab Results   Component Value Date     01/05/2017    POTASSIUM 3.6 01/05/2017    CHLORIDE 110 (H) 01/05/2017    CO2 22 01/05/2017    ANIONGAP 10 01/05/2017     (H) 01/05/2017    BUN 12 01/05/2017    CR 0.70 01/05/2017    GFRESTIMATED 83 01/05/2017    GFRESTBLACK >90   GFR Calc   01/05/2017    HOLLY 8.7 01/05/2017        A1C RESULTS:  Lab Results   Component Value Date    A1C 5.9 01/05/2017       INR RESULTS:  No results found  for: [facility]        CC  José Miguel Martin MD  [address]  [place] [id number]

## 2018-05-02 NOTE — MR AVS SNAPSHOT
After Visit Summary   5/2/2018    Yaneli Ramey    MRN: [id number]           Patient Information     Date Of Birth          1950        Visit Information        Provider Department      5/2/2018 10:10 AM Cass Powell APRN CNP [facility]        Today's Diagnoses     Paroxysmal supraventricular tachycardia (H)          Care Instructions    Call if you questions and we will touch in 2 weeks on dosage of flecainide   You will receive all normal lab and testing results via MyChart or mail if not reviewed in clinic today. Please contact our office if you need assistance with setting up MyChart.    If you need a medication refill please contact your pharmacy. Please allow 3 business days for your refill to be completed.     As always, thank you for trusting us with your health care needs!    If you have any questions regarding your visit please contact your care team:   Cardiology  Telephone Number    Afib RNs  Maren Richardson, and Radha [phone]     Call for Electrophysiology procedure scheduling concerns  Shelbi Dax [phone]   Device Clinic (Pacemakers, ICDs, Loop)   RN's :      Emilie Shah Eva, Lynda, MJ, Lien Crabtree During business hours:   [phone]                     Follow-ups after your visit        Additional Services     Follow-Up with Electrophysiologist                 Future tests that were ordered for you today     Open Future Orders        Priority Expected Expires Ordered    EKG 12-lead complete w/read - Clinics (to be scheduled) Routine 5/2/2019 5/3/2019 5/2/2018    Follow-Up with Electrophysiologist Routine 5/2/2019 5/3/2019 5/2/2018            Who to contact     If you have questions or need follow up information about today's clinic visit or your schedule please contact [facility] directly at [phone].  Normal or non-critical lab and imaging results will be  "communicated to you by MyChart, letter or phone within 4 business days after the clinic has received the results. If you do not hear from us within 7 days, please contact the clinic through [employer] or phone. If you have a critical or abnormal lab result, we will notify you by phone as soon as possible.  Submit refill requests through [employer] or call your pharmacy and they will forward the refill request to us. Please allow 3 business days for your refill to be completed.          Additional Information About Your Visit        [employer] Information     [employer] gives you secure access to your electronic health record. If you see a primary care provider, you can also send messages to your care team and make appointments. If you have questions, please call your primary care clinic.  If you do not have a primary care provider, please call [phone] and they will assist you.        Care EveryWhere ID     This is your Care EveryWhere ID. This could be used by other organizations to access your [facility] medical records  [id number]        Your Vitals Were     Pulse Height Pulse Oximetry BMI (Body Mass Index)          69 1.778 m (5' 10\") 94% 26.26 kg/m2         Blood Pressure from Last 3 Encounters:   05/02/18 114/80   04/19/17 120/80   01/18/17 108/64    Weight from Last 3 Encounters:   05/02/18 83 kg (183 lb)   04/19/17 85.7 kg (189 lb)   01/18/17 84.8 kg (187 lb)              We Performed the Following     Follow-Up with Cardiac Advanced Practice Provider        Primary Care Provider Office Phone # Fax #    Kya Hodges [phone]       [address] [address]        Equal Access to Services     [facility]: Hadii yanique [place], waeddyda akira, qaybta kaalbonifacio aquino. So [facility] [phone].    ATENCIÓN: Si habla español, tiene a rios disposición servicios gratuitos de asistencia lingüística. Llame al " [phone].    We comply with applicable federal civil rights laws and [place] laws. We do not discriminate on the basis of race, color, national origin, age, disability, sex, sexual orientation, or gender identity.            Thank you!     Thank you for choosing [facility]  for your care. Our goal is always to provide you with excellent care. Hearing back from our patients is one way we can continue to improve our services. Please take a few minutes to complete the written survey that you may receive in the mail after your visit with us. Thank you!             Your Updated Medication List - Protect others around you: Learn how to safely use, store and throw away your medicines at www.disposemymeds.org.          This list is accurate as of 5/2/18 12:02 PM.  Always use your most recent med list.                   Brand Name Dispense Instructions for use Diagnosis    aspirin 81 MG tablet      Take 81 mg by mouth daily        flecainide acetate 150 MG Tabs     15 tablet    Take 1/2 pill (75 mg) twice daily    Paroxysmal supraventricular tachycardia (H)       GLUCOSAMINE HCL-MSM PO      Take by mouth daily        MELATONIN-THEANINE PO      Take 1 tablet by mouth At Bedtime        multivitamin, therapeutic Tabs tablet      Take 1 tablet by mouth daily        PROBIOTIC DAILY PO      Take 1 capsule by mouth three times a week Takes one capsule Sunday, Tuesday, Thursday        Vitamin D (Cholecalciferol) 1000 units Caps      Take by mouth daily

## 2018-05-02 NOTE — LETTER
"5/2/2018    Kya Hodges NP  [facility] [address] [facility] [address]    RE: Yaneli Ramey       Dear Colleague,    I had the pleasure of seeing Yaneli Ramey in the [facility].    HPI:  Yaneli Ramey is a 67 year old female who is a patient of Dr Martin.  She presents for follow-up for atrial tachycardia which she is taking flecainide.  In 2017 she developed lightheadedness with palpitations and found to have frequent runs of SVT she was admitted and seen by Dr. Alfredo Ortiz who started flecainide 100 mg twice daily and metoprolol 25 mg twice daily.  Her palpitations improved however she had significant fatigue therefore when she saw Dr. Bernstein 1/18/17 he discontinued the metoprolol and continued flecainide.  She saw Dr. Martin 4/17 who decreased her flecainide to 50 mg twice daily, however, she had symptoms and her flecainide was increased to 75 mg twice daily.   Had        Today Yaneli Ramey presents today with many questions regarding herbs, alcohol use, exercise maximum heart rates, and checking her pulse.  Overall she is doing well she has had no palpitations except when she was active this winter and the disappeared very quickly.  She is interested in decreasing her flecainide to 50 mg twice daily and she is going to slowly do a taper I will touch base with her in 2 weeks.  She exercises regularly doing pickleball and Pilates denies any CV symptoms.  She feels at times she cannot \"change gears\" with activity but when she does she is fine.  Denies any snoring she drinks alcohol very minimally and does not use tobacco.  Denies chest pain or pressure, headaches, dizziness, syncope, angina, dyspnea at rest or with exertion, dry cough, palpitations, orthopnea, PND, abdominal pain, abdominal edema, pedal edema, or claudication.        ASSESSMENT AND PLAN    (I47.1) Paroxysmal supraventricular tachycardia (H)  She is doing well on flecainide 75 mg twice " daily she would like to reduce to 50 mg twice daily.  I will touch base with her in 2 weeks on her final dosage.  Electrolytes were reviewed from outside clinic and are normal.  Encouraged her to continue to exercise and notify us if she has any cardiovascular symptoms.  Discussed continue with blood pressure less than 130/80, moderate exercise.      Follow-up in 2 weeks with phone and then 1 year with Dr. Martin EKG    Patient expresses understanding and agreement with the plan.     I appreciate the chance to help with Yaneli JARAMILLO [place] Please let me know if you have any questions or concerns.    Cass Powell, APRN, CNP    This note was completed in part using Dragon voice recognition software. Although reviewed after completion, some word and grammatical errors may occur.    Orders Placed This Encounter   Procedures     Follow-Up with Electrophysiologist     EKG 12-lead complete w/read - Clinics (to be scheduled)     No orders of the defined types were placed in this encounter.    There are no discontinued medications.      Encounter Diagnosis   Name Primary?     Paroxysmal supraventricular tachycardia (H)        CURRENT MEDICATIONS:  Current Outpatient Prescriptions   Medication Sig Dispense Refill     aspirin 81 MG tablet Take 81 mg by mouth daily       flecainide acetate 150 MG TABS Take 1/2 pill (75 mg) twice daily 15 tablet 11     GLUCOSAMINE HCL-MSM PO Take by mouth daily       MELATONIN-THEANINE PO Take 1 tablet by mouth At Bedtime       multivitamin, therapeutic (THERA-VIT) TABS tablet Take 1 tablet by mouth daily       Probiotic Product (PROBIOTIC DAILY PO) Take 1 capsule by mouth three times a week Takes one capsule Sunday, Tuesday, Thursday       Vitamin D, Cholecalciferol, 1000 UNITS CAPS Take by mouth daily         ALLERGIES     Allergies   Allergen Reactions     Adhesive Tape      Estradiol      Percocet [Oxycodone-Acetaminophen]      Septra [Sulfamethoxazole W-Trimethoprim]        PAST MEDICAL  HISTORY:  Past Medical History:   Diagnosis Date     Asthma      Gastro-oesophageal reflux disease      Paroxysmal supraventricular tachycardia (H)      Pneumonia      SVT (supraventricular tachycardia) (H)        PAST SURGICAL HISTORY:  Past Surgical History:   Procedure Laterality Date     BUNIONECTOMY       DISCECTOMY LUMBAR MINIMALLY INVASIVE TWO LEVELS       HYSTERECTOMY       REPAIR HAMMER TOE       REPAIR LIGAMENT ULNAR COLLATERAL (ELBOW)       REPAIR PTOSIS BILATERAL  12/23/2013    Procedure: REPAIR PTOSIS BILATERAL;  right upper lid ptosis repair and bilateral upper eyelid mechanical ptosis repair;  Surgeon: Santo Choudhury MD;  Location: [facility]       FAMILY HISTORY:  Family History   Problem Relation Age of Onset     Emphysema Father      Coronary Artery Disease Early Onset Father      Prostate Cancer Father      Lupus Mother        SOCIAL HISTORY:  Social History     Social History     Marital status: Single     Spouse name: N/A     Number of children: N/A     Years of education: N/A     Social History Main Topics     Smoking status: Former Smoker     Smokeless tobacco: Never Used     Alcohol use 0.0 oz/week     0 Standard drinks or equivalent per week      Comment: min.     Drug use: No     Sexual activity: Not Asked     Other Topics Concern     Special Diet No     Exercise No     Social History Narrative       Review of Systems:  Skin:  Negative     Eyes:  Positive for glasses  ENT:  Positive for    Respiratory:  Positive for dyspnea on exertion  Cardiovascular:    lightheadedness;palpitations;Positive for  Gastroenterology: Positive for excessive gas or bloating  Genitourinary:  Positive for urinary frequency  Musculoskeletal:  Positive for back pain;neck pain  Neurologic:  Positive for numbness or tingling of feet  Psychiatric:  Negative    Heme/Lymph/Imm:  Negative    Endocrine:  Negative      Physical Exam:  Vitals: /80 (BP Location: Right arm, Patient Position: Sitting, Cuff Size: Adult  "Regular)  Pulse 69  Ht 1.778 m (5' 10\")  Wt 83 kg (183 lb)  SpO2 94%  BMI 26.26 kg/m2    Constitutional:  cooperative, alert and oriented, well developed, well nourished, in no acute distress        Skin:  warm and dry to the touch, no apparent skin lesions or masses noted        Head:  normocephalic, no masses or lesions        Eyes:  pupils equal and round, conjunctivae and lids unremarkable, sclera white, no xanthalasma, EOMS intact, no nystagmus        ENT:  no pallor or cyanosis, dentition good        Neck:  carotid pulses are full and equal bilaterally, JVP normal, no carotid bruit        Chest:  normal breath sounds, clear to auscultation, normal A-P diameter, normal symmetry, normal respiratory excursion, no use of accessory muscles        Cardiac: regular rhythm, normal S1/S2, no S3 or S4, apical impulse not displaced, no murmurs, gallops or rubs                  Abdomen:  abdomen soft        Vascular:       right radial artery;2+ right carotid artery;1+     right dorsalis pedis artery;1+     left radial artery;2+ left carotid artery;1+     left dorsalis pedis artery;1+          Extremities and Back:  no deformities, clubbing, cyanosis, erythema observed;no edema        Neurological:  no gross motor deficits          Recent Lab Results:  LIPID RESULTS:  No results found for: CHOL, HDL, LDL, TRIG, CHOLHDLRATIO    LIVER ENZYME RESULTS:  No results found for: AST, ALT    CBC RESULTS:  Lab Results   Component Value Date    WBC 9.0 01/04/2017    RBC 5.06 01/04/2017    HGB 15.5 01/04/2017    HCT 45.6 01/04/2017    MCV 90 01/04/2017    MCH 30.6 01/04/2017    MCHC 34.0 01/04/2017    RDW 13.2 01/04/2017     01/04/2017       BMP RESULTS:  Lab Results   Component Value Date     01/05/2017    POTASSIUM 3.6 01/05/2017    CHLORIDE 110 (H) 01/05/2017    CO2 22 01/05/2017    ANIONGAP 10 01/05/2017     (H) 01/05/2017    BUN 12 01/05/2017    CR 0.70 01/05/2017    GFRESTIMATED 83 01/05/2017    " GFRESTBLACK >90   GFR Calc   01/05/2017    HOLLY 8.7 01/05/2017        A1C RESULTS:  Lab Results   Component Value Date    A1C 5.9 01/05/2017       INR RESULTS:  No results found for: INR        Thank you for allowing me to participate in the care of your patient.    Sincerely,     KIMBER Baird [facility]

## 2018-05-10 ENCOUNTER — DOCUMENTATION ONLY (OUTPATIENT)
Dept: CARDIOLOGY | Facility: CLINIC | Age: 68
End: 2018-05-10

## 2018-05-10 ENCOUNTER — TELEPHONE (OUTPATIENT)
Dept: CARDIOLOGY | Facility: CLINIC | Age: 68
End: 2018-05-10

## 2018-05-10 DIAGNOSIS — I47.10 PAROXYSMAL SUPRAVENTRICULAR TACHYCARDIA (H): ICD-10-CM

## 2018-05-10 NOTE — PROGRESS NOTES
Returned patient's call. Patient saw Cass 5/2 which they dicussed slowly decreasing the Flecainide dose from 75 mg bid to 50 mg bid. Patient attempted to decrease dose but develop more skip beats so she went back up on the 75 mg bid. She wanted Cass to be notified and will need a new RX for the 75 mgs. Will message Cass. Reassured patient the skip beats were benign

## 2018-05-10 NOTE — TELEPHONE ENCOUNTER
"Cass, You saw this patient 5/2 and discussed decreasing her flecainide from 75 mg bid to 50 mg bid. Today she called stating she started decreasing the dose but developed more skip beats so she went back up to 75 mg. She wanted you to know and if that's ok she will need a RX for the 75 mg. Also she was still feeling that she unable to get \"out of gear\". Thanks Emilie  "

## 2018-05-11 RX ORDER — FLECAINIDE ACETATE 150 MG/1
TABLET ORAL
Qty: 90 TABLET | Refills: 3 | Status: SHIPPED | OUTPATIENT
Start: 2018-05-11 | End: 2019-07-30

## 2018-05-11 NOTE — TELEPHONE ENCOUNTER
No problem.  Please go back up to flecainide 75 mg twice a day     The feeling like she cannot move a up gear is a normal side effect of the cardiac medications.  She needs to continue basing her exercise on her feelings of exertion verses heart rates.       I would recommend continuing on flecainide as she has symptoms of palpitations on flecainide 50 mg Twice daily   cammie      Spoke to pt and made pt aware that ok to stay on the 75 mg twice daily.  PT states that she tried the lower dose, but each day she felt the palpitations and they kept increasing in number.  Explained to pt that this writer will escript Flecainide to pt pharmacy.  [place]

## 2019-02-28 ENCOUNTER — TELEPHONE (OUTPATIENT)
Dept: CARDIOLOGY | Facility: CLINIC | Age: 69
End: 2019-02-28

## 2019-02-28 DIAGNOSIS — I47.10 PAROXYSMAL SUPRAVENTRICULAR TACHYCARDIA (H): Primary | ICD-10-CM

## 2019-02-28 NOTE — TELEPHONE ENCOUNTER
Pt states that she had been doing so well for th past 2 years on the Flecainide 75 mg bid, but for that she has been having palpitations for last day 1/2.  Pt has the skipping feeling and gas pressure in her abdomen.  Pt states that resting HR is 60's and with exercise only gets HR up to the low 100's.  Pt has noticed rates in the 80's. Pt has a history of PSVT and states that she always had the skipping beat feeling in the past, but because this was such a sudden change she called.  Thus it was also giving pt anxiety.  Will have pt go to  for and EKG and then will move pt annual OV up so that pt see's Dr Martin on 3/7 in [place] at 1445.  Will await EKG to be completed. Destin

## 2019-03-06 ENCOUNTER — TRANSFERRED RECORDS (OUTPATIENT)
Dept: HEALTH INFORMATION MANAGEMENT | Facility: CLINIC | Age: 69
End: 2019-03-06

## 2019-03-07 ENCOUNTER — OFFICE VISIT (OUTPATIENT)
Dept: CARDIOLOGY | Facility: CLINIC | Age: 69
End: 2019-03-07
Attending: NURSE PRACTITIONER
Payer: COMMERCIAL

## 2019-03-07 VITALS
BODY MASS INDEX: 22.71 KG/M2 | HEART RATE: 80 BPM | SYSTOLIC BLOOD PRESSURE: 120 MMHG | WEIGHT: 158.6 LBS | DIASTOLIC BLOOD PRESSURE: 81 MMHG | HEIGHT: 70 IN

## 2019-03-07 DIAGNOSIS — I47.10 PAROXYSMAL SUPRAVENTRICULAR TACHYCARDIA (H): ICD-10-CM

## 2019-03-07 DIAGNOSIS — R00.2 PALPITATIONS: Primary | ICD-10-CM

## 2019-03-07 PROCEDURE — 99214 OFFICE O/P EST MOD 30 MIN: CPT | Mod: 25 | Performed by: INTERNAL MEDICINE

## 2019-03-07 ASSESSMENT — MIFFLIN-ST. JEOR: SCORE: 1324.65

## 2019-03-07 NOTE — PROGRESS NOTES
HPI and Plan:   See dictation    Orders Placed This Encounter   Procedures     EKG 12-lead complete w/read - Clinics (performed today)       No orders of the defined types were placed in this encounter.      There are no discontinued medications.      Encounter Diagnosis   Name Primary?     Paroxysmal supraventricular tachycardia (H)        CURRENT MEDICATIONS:  Current Outpatient Medications   Medication Sig Dispense Refill     aspirin 81 MG tablet Take 81 mg by mouth daily       flecainide acetate 150 MG TABS Take 1/2 tablet (75 mg) by mouth twice daily 90 tablet 3     GLUCOSAMINE HCL-MSM PO Take by mouth daily       multivitamin, therapeutic (THERA-VIT) TABS tablet Take 1 tablet by mouth daily       Probiotic Product (PROBIOTIC DAILY PO) Take 1 capsule by mouth three times a week Takes one capsule Sunday, Tuesday, Thursday       Vitamin D, Cholecalciferol, 1000 UNITS CAPS Take by mouth daily       MELATONIN-THEANINE PO Take 1 tablet by mouth At Bedtime         ALLERGIES     Allergies   Allergen Reactions     Adhesive Tape      Estradiol      Percocet [Oxycodone-Acetaminophen]      Septra [Sulfamethoxazole W-Trimethoprim]        PAST MEDICAL HISTORY:  Past Medical History:   Diagnosis Date     Asthma      Gastro-oesophageal reflux disease      Paroxysmal supraventricular tachycardia (H)      Pneumonia      SVT (supraventricular tachycardia) (H)        PAST SURGICAL HISTORY:  Past Surgical History:   Procedure Laterality Date     BUNIONECTOMY       DISCECTOMY LUMBAR MINIMALLY INVASIVE TWO LEVELS       HYSTERECTOMY       REPAIR HAMMER TOE       REPAIR LIGAMENT ULNAR COLLATERAL (ELBOW)       REPAIR PTOSIS BILATERAL  12/23/2013    Procedure: REPAIR PTOSIS BILATERAL;  right upper lid ptosis repair and bilateral upper eyelid mechanical ptosis repair;  Surgeon: Santo Choudhury MD;  Location: [facility]       FAMILY HISTORY:  Family History   Problem Relation Age of Onset     Emphysema Father      Coronary Artery Disease  Early Onset Father      Prostate Cancer Father      Lupus Mother        SOCIAL HISTORY:  Social History     Socioeconomic History     Marital status: Single     Spouse name: Not on file     Number of children: Not on file     Years of education: Not on file     Highest education level: Not on file   Occupational History     Not on file   Social Needs     Financial resource strain: Not on file     Food insecurity:     Worry: Not on file     Inability: Not on file     Transportation needs:     Medical: Not on file     Non-medical: Not on file   Tobacco Use     Smoking status: Former Smoker     Smokeless tobacco: Never Used   Substance and Sexual Activity     Alcohol use: Yes     Alcohol/week: 0.0 oz     Comment: min.     Drug use: No     Sexual activity: Not on file   Lifestyle     Physical activity:     Days per week: Not on file     Minutes per session: Not on file     Stress: Not on file   Relationships     Social connections:     Talks on phone: Not on file     Gets together: Not on file     Attends Holiness service: Not on file     Active member of club or organization: Not on file     Attends meetings of clubs or organizations: Not on file     Relationship status: Not on file     Intimate partner violence:     Fear of current or ex partner: Not on file     Emotionally abused: Not on file     Physically abused: Not on file     Forced sexual activity: Not on file   Other Topics Concern     Parent/sibling w/ CABG, MI or angioplasty before 65F 55M? Not Asked      Service Not Asked     Blood Transfusions Not Asked     Caffeine Concern Not Asked     Occupational Exposure Not Asked     Hobby Hazards Not Asked     Sleep Concern Not Asked     Stress Concern Not Asked     Weight Concern Not Asked     Special Diet No     Back Care Not Asked     Exercise No     Bike Helmet Not Asked     Seat Belt Not Asked     Self-Exams Not Asked   Social History Narrative     Not on file       Review of Systems:  Skin:  Negative        Eyes:  Positive for glasses PVD lefft eye   ENT:  Negative      Respiratory:  Negative       Cardiovascular:    palpitations;Positive for AT REST LAST WEEK  Gastroenterology: Positive for excessive gas or bloating;poor appetite OCC  Genitourinary:  Positive for urinary frequency    Musculoskeletal:  Positive for back pain;neck pain Still has but resolved somewhat  Neurologic:  Positive for numbness or tingling of feet still feels like road rah on left calf. but much reduced  Psychiatric:  Negative      Heme/Lymph/Imm:  Negative      Endocrine:  Negative        [id number]

## 2019-03-07 NOTE — LETTER
3/7/2019    Kya Hodges NP  [facility] [address] [facility] [address]    RE: Yaneli Ramey       Dear Colleague,    I had the pleasure of seeing Yaneli Ramey in the [facility].    HPI and Plan:   See dictation    Orders Placed This Encounter   Procedures     EKG 12-lead complete w/read - Clinics (performed today)       No orders of the defined types were placed in this encounter.      There are no discontinued medications.      Encounter Diagnosis   Name Primary?     Paroxysmal supraventricular tachycardia (H)        CURRENT MEDICATIONS:  Current Outpatient Medications   Medication Sig Dispense Refill     aspirin 81 MG tablet Take 81 mg by mouth daily       flecainide acetate 150 MG TABS Take 1/2 tablet (75 mg) by mouth twice daily 90 tablet 3     GLUCOSAMINE HCL-MSM PO Take by mouth daily       multivitamin, therapeutic (THERA-VIT) TABS tablet Take 1 tablet by mouth daily       Probiotic Product (PROBIOTIC DAILY PO) Take 1 capsule by mouth three times a week Takes one capsule Sunday, Tuesday, Thursday       Vitamin D, Cholecalciferol, 1000 UNITS CAPS Take by mouth daily       MELATONIN-THEANINE PO Take 1 tablet by mouth At Bedtime         ALLERGIES     Allergies   Allergen Reactions     Adhesive Tape      Estradiol      Percocet [Oxycodone-Acetaminophen]      Septra [Sulfamethoxazole W-Trimethoprim]        PAST MEDICAL HISTORY:  Past Medical History:   Diagnosis Date     Asthma      Gastro-oesophageal reflux disease      Paroxysmal supraventricular tachycardia (H)      Pneumonia      SVT (supraventricular tachycardia) (H)        PAST SURGICAL HISTORY:  Past Surgical History:   Procedure Laterality Date     BUNIONECTOMY       DISCECTOMY LUMBAR MINIMALLY INVASIVE TWO LEVELS       HYSTERECTOMY       REPAIR HAMMER TOE       REPAIR LIGAMENT ULNAR COLLATERAL (ELBOW)       REPAIR PTOSIS BILATERAL  12/23/2013    Procedure: REPAIR PTOSIS BILATERAL;  right upper  lid ptosis repair and bilateral upper eyelid mechanical ptosis repair;  Surgeon: Santo Choudhury MD;  Location: [facility]       FAMILY HISTORY:  Family History   Problem Relation Age of Onset     Emphysema Father      Coronary Artery Disease Early Onset Father      Prostate Cancer Father      Lupus Mother        SOCIAL HISTORY:  Social History     Socioeconomic History     Marital status: Single     Spouse name: Not on file     Number of children: Not on file     Years of education: Not on file     Highest education level: Not on file   Occupational History     Not on file   Social Needs     Financial resource strain: Not on file     Food insecurity:     Worry: Not on file     Inability: Not on file     Transportation needs:     Medical: Not on file     Non-medical: Not on file   Tobacco Use     Smoking status: Former Smoker     Smokeless tobacco: Never Used   Substance and Sexual Activity     Alcohol use: Yes     Alcohol/week: 0.0 oz     Comment: min.     Drug use: No     Sexual activity: Not on file   Lifestyle     Physical activity:     Days per week: Not on file     Minutes per session: Not on file     Stress: Not on file   Relationships     Social connections:     Talks on phone: Not on file     Gets together: Not on file     Attends Taoist service: Not on file     Active member of club or organization: Not on file     Attends meetings of clubs or organizations: Not on file     Relationship status: Not on file     Intimate partner violence:     Fear of current or ex partner: Not on file     Emotionally abused: Not on file     Physically abused: Not on file     Forced sexual activity: Not on file   Other Topics Concern     Parent/sibling w/ CABG, MI or angioplasty before 65F 55M? Not Asked      Service Not Asked     Blood Transfusions Not Asked     Caffeine Concern Not Asked     Occupational Exposure Not Asked     Hobby Hazards Not Asked     Sleep Concern Not Asked     Stress Concern Not Asked      Weight Concern Not Asked     Special Diet No     Back Care Not Asked     Exercise No     Bike Helmet Not Asked     Seat Belt Not Asked     Self-Exams Not Asked   Social History Narrative     Not on file       Review of Systems:  Skin:  Negative       Eyes:  Positive for glasses PVD lefft eye   ENT:  Negative      Respiratory:  Negative       Cardiovascular:    palpitations;Positive for AT REST LAST WEEK  Gastroenterology: Positive for excessive gas or bloating;poor appetite OCC  Genitourinary:  Positive for urinary frequency    Musculoskeletal:  Positive for back pain;neck pain Still has but resolved somewhat  Neurologic:  Positive for numbness or tingling of feet still feels like road rah on left calf. but much reduced  Psychiatric:  Negative      Heme/Lymph/Imm:  Negative      Endocrine:  Negative        [id number]            Thank you for allowing me to participate in the care of your patient.      Sincerely,     José Miguel Martin MD     [facility]    cc:   Cass Powell, APRN CNP  [address]  [place] [address]

## 2019-03-07 NOTE — PROGRESS NOTES
"Service Date: 03/07/2019      HISTORY OF PRESENT ILLNESS:    Ms. Yaneli Ramey, a pleasant 68-year-old female, requested this visit to discuss concerns about her heart.  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  Symptomatic PAC, PVCs.   B.  Episodes of nonsustained SVT associated with lightheadedness, leading to hospitalization in 01/2017.  Since then she has been treated with flecainide.  Excellent suppression on flecainide.   C.  Dyslipidemia.      Ms. Ramey has taken flecainide 75 mg b.i.d. for the past couple of years.  She tried to use a lower dose, but palpitation increased.  She does not have apparent side-effects.      One week ago there was a period of time, up to 30 minutes where she was having frequent \"skipped beats\".  She told me she became very anxious with this.  She took her flecainide dose a little earlier and within half hour the sensation went away.        She had recurrence of the same issue last night and ended up at urgent care [facility].  Her 12-lead ECG that she brought in today was normal.  She was told that on the cardiac monitor she was having occasional ectopic beats.  She was reassured.      She had numerous questions today.  She is rather concerned with her arrhythmias.  She is also concerned that her heart rate does not increase appropriately with exercise.  She is concerned taking flecainide long-term.  She does not have chest pain with exertion and has never had syncope.      PHYSICAL EXAMINATION:    VITAL SIGNS:  Blood pressure 120/80, heart rate 80 and regular.  Weight is 72 kilos, height 178 cm.    GENERAL:  She is a pleasant woman who appears healthy, in no apparent distress.  She has lost 25 pounds over the past year.   HEENT:  Head normocephalic, atraumatic.  Sclerae are anicteric.  Mouth, oropharynx clear.   NECK:  Supple without carotid bruits.   LUNGS:  Clear.   CARDIOVASCULAR:  Regular rhythm.  No gallop, " "murmur or rub.   EXTREMITIES:  No edema.      DIAGNOSTIC STUDIES:  Her 12-lead ECG from last night shows sinus rhythm and is within normal limits.      IMPRESSION:   1.  Symptomatic ectopic beats.  I have reassured Ms. Poole that these are benign.  It is to be expected that she will have occasional ectopic beats.  On a happier note, her runs of SVT that caused lightheadedness in the past seem to be well suppressed on flecainide.  So far, we have not seen atrial fibrillation in this patient, but given the atrial ectopy and runs of atrial tachycardia she is at risk for AF down the road.         She asked whether this is a \"good medication\" to take long-term.  I reassured her that her dose of flecainide is reasonable.  She is not experiencing obvious side-effects.  We discussed caveats with flecainide, but for the time being this appears to be a safe medication for her.  It may need to be discontinued if she develops vascular disease or bradycardia.      2.  Exercise program.  The patient had many questions about her exercise schedule, whether it is intense enough and whether it is appropriate for her.  She exercises 5 days per week, mostly strength training.  I congratulated her on her regular exercise program but emphasized that aerobic exercise is more beneficial for her cardiovascular system compared to strength training.  Strength training is important for her bones and muscle tone but less so for cardiovascular health and conditioning.  She will try to do a combination of both with emphasis on aerobic exercise.      RECOMMENDATIONS:   A.  Continue current medications.   B.  Follow up with LYNDSEY in 1 year.      Time spent 30 minutes, greater than 50% of the time with discussion.        CHRIS NETTLES MD, FACC          cc:   Kya Hodges NP    [facility]    [address] [place]  [address]          D: 03/07/2019   T: 03/07/2019   MT: RAF      Name:     ROBERT POOLE   MRN:      " [id number]        Account:      [id number]   :      1950           Service Date: 2019      Document: [id number]

## 2019-03-07 NOTE — LETTER
"3/7/2019      Kya Hodges NP  [facility] [address] [facility] [address]      RE: Yaneli Ramey       Dear Colleague,    I had the pleasure of seeing Yaneli Ramey in the [facility].    Service Date: 03/07/2019      HISTORY OF PRESENT ILLNESS:    Ms. Yaneli Ramey, a pleasant 68-year-old female, requested this visit to discuss concerns about her heart.  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  Symptomatic PAC, PVCs.   B.  Episodes of nonsustained SVT associated with lightheadedness, leading to hospitalization in 01/2017.  Since then she has been treated with flecainide.  Excellent suppression on flecainide.   C.  Dyslipidemia.      Ms. Ramey has taken flecainide 75 mg b.i.d. for the past couple of years.  She tried to use a lower dose, but palpitation increased.  She does not have apparent side-effects.      One week ago there was a period of time, up to 30 minutes where she was having frequent \"skipped beats\".  She told me she became very anxious with this.  She took her flecainide dose a little earlier and within half hour the sensation went away.        She had recurrence of the same issue last night and [facility]  Her 12-lead ECG that she brought in today was normal.  She was told that on the cardiac monitor she was having occasional ectopic beats.  She was reassured.      She had numerous questions today.  She is rather concerned with her arrhythmias.  She is also concerned that her heart rate does not increase appropriately with exercise.  She is concerned taking flecainide long-term.  She does not have chest pain with exertion and has never had syncope.      PHYSICAL EXAMINATION:    VITAL SIGNS:  Blood pressure 120/80, heart rate 80 and regular.  Weight is 72 kilos, height 178 cm.    GENERAL:  She is a pleasant woman who appears healthy, in no apparent " "distress.  She has lost 25 pounds over the past year.   HEENT:  Head normocephalic, atraumatic.  Sclerae are anicteric.  Mouth, oropharynx clear.   NECK:  Supple without carotid bruits.   LUNGS:  Clear.   CARDIOVASCULAR:  Regular rhythm.  No gallop, murmur or rub.   EXTREMITIES:  No edema.      DIAGNOSTIC STUDIES:  Her 12-lead ECG from last night shows sinus rhythm and is within normal limits.      IMPRESSION:   1.  Symptomatic ectopic beats.  I have reassured Ms. Ramey that these are benign.  It is to be expected that she will have occasional ectopic beats.  On a happier note, her runs of SVT that caused lightheadedness in the past seem to be well suppressed on flecainide.  So far, we have not seen atrial fibrillation in this patient, but given the atrial ectopy and runs of atrial tachycardia she is at risk for AF down the road.         She asked whether this is a \"good medication\" to take long-term.  I reassured her that her dose of flecainide is reasonable.  She is not experiencing obvious side-effects.  We discussed caveats with flecainide, but for the time being this appears to be a safe medication for her.  It may need to be discontinued if she develops vascular disease or bradycardia.      2.  Exercise program.  The patient had many questions about her exercise schedule, whether it is intense enough and whether it is appropriate for her.  She exercises 5 days per week, mostly strength training.  I congratulated her on her regular exercise program but emphasized that aerobic exercise is more beneficial for her cardiovascular system compared to strength training.  Strength training is important for her bones and muscle tone but less so for cardiovascular health and conditioning.  She will try to do a combination of both with emphasis on aerobic exercise.      RECOMMENDATIONS:   A.  Continue current medications.   B.  Follow up with LYNDSEY in 1 year.      Time spent 30 minutes, greater than 50% of the time with " discussion.        CHRIS NETTLES MD, FACC          cc:   Kya Hodges NP    [facility]    [address] [place]  [address]          D: 2019   T: 2019   MT: RAF      Name:     ROBERT POOLE   MRN:      [id number]        Account:      [id number]   :      1950           Service Date: 2019      Document: [id number]           Outpatient Encounter Medications as of 3/7/2019   Medication Sig Dispense Refill     aspirin 81 MG tablet Take 81 mg by mouth daily       flecainide acetate 150 MG TABS Take 1/2 tablet (75 mg) by mouth twice daily 90 tablet 3     GLUCOSAMINE HCL-MSM PO Take by mouth daily       multivitamin, therapeutic (THERA-VIT) TABS tablet Take 1 tablet by mouth daily       Probiotic Product (PROBIOTIC DAILY PO) Take 1 capsule by mouth three times a week Takes one capsule , Tuesday, Thursday       Vitamin D, Cholecalciferol, 1000 UNITS CAPS Take by mouth daily       MELATONIN-THEANINE PO Take 1 tablet by mouth At Bedtime       No facility-administered encounter medications on file as of 3/7/2019.        Again, thank you for allowing me to participate in the care of your patient.      Sincerely,    Chris Nettles MD     [facility]

## 2019-07-30 ENCOUNTER — TELEPHONE (OUTPATIENT)
Dept: CARDIOLOGY | Facility: CLINIC | Age: 69
End: 2019-07-30

## 2019-07-30 DIAGNOSIS — I47.10 PAROXYSMAL SUPRAVENTRICULAR TACHYCARDIA (H): ICD-10-CM

## 2019-07-30 RX ORDER — FLECAINIDE ACETATE 150 MG/1
TABLET ORAL
Qty: 90 TABLET | Refills: 0 | Status: SHIPPED | OUTPATIENT
Start: 2019-07-30 | End: 2020-02-28

## 2019-07-30 NOTE — TELEPHONE ENCOUNTER
Pt called and LM stating that she is traveling and has told that should have a hard copy of her Flecainide for the trip.  Made pt aware that this can be done. Will place copy on Dr Martin desk to sign and then place in the mail as pt requested. [place]

## 2019-09-30 ENCOUNTER — HEALTH MAINTENANCE LETTER (OUTPATIENT)
Age: 69
End: 2019-09-30

## 2019-12-15 ENCOUNTER — HEALTH MAINTENANCE LETTER (OUTPATIENT)
Age: 69
End: 2019-12-15

## 2020-02-28 DIAGNOSIS — I47.10 PAROXYSMAL SUPRAVENTRICULAR TACHYCARDIA (H): ICD-10-CM

## 2020-02-28 RX ORDER — FLECAINIDE ACETATE 150 MG/1
TABLET ORAL
Qty: 90 TABLET | Refills: 0 | Status: SHIPPED | OUTPATIENT
Start: 2020-02-28 | End: 2020-04-01

## 2020-02-28 NOTE — TELEPHONE ENCOUNTER
Patient called requesting refill on flecainide.  Patient requesting [facility] pharmacy in [place].  Informed patient she is due for annual follow up.  Sent script to preferred pharmacy.  Patient scheduled for annual follow up with Dr Martin on 4/8 in [place].  FELY Jaffe

## 2020-04-01 ENCOUNTER — TELEPHONE (OUTPATIENT)
Dept: CARDIOLOGY | Facility: CLINIC | Age: 70
End: 2020-04-01

## 2020-04-01 DIAGNOSIS — I47.10 PAROXYSMAL SUPRAVENTRICULAR TACHYCARDIA (H): ICD-10-CM

## 2020-04-01 RX ORDER — FLECAINIDE ACETATE 150 MG/1
TABLET ORAL
Qty: 90 TABLET | Refills: 0 | Status: SHIPPED | OUTPATIENT
Start: 2020-04-01 | End: 2020-08-12

## 2020-04-01 NOTE — TELEPHONE ENCOUNTER
04/01 Called t to change OV on 4/8 to telephone visit d/t current COVID 19 restrictions. Pt prefers to extend OV until early June as she is feeling well and really would like to be able to have annual EKG. Order for f/u extended to 6/1 and 3 mo courtesy refill sent w no refills. Pt has no further questions at this time. Hyacinth 1125 am

## 2020-08-12 ENCOUNTER — OFFICE VISIT (OUTPATIENT)
Dept: CARDIOLOGY | Facility: CLINIC | Age: 70
End: 2020-08-12
Attending: INTERNAL MEDICINE
Payer: COMMERCIAL

## 2020-08-12 VITALS
HEIGHT: 70 IN | SYSTOLIC BLOOD PRESSURE: 134 MMHG | BODY MASS INDEX: 22.58 KG/M2 | WEIGHT: 157.7 LBS | DIASTOLIC BLOOD PRESSURE: 88 MMHG | HEART RATE: 76 BPM

## 2020-08-12 DIAGNOSIS — I47.10 PAROXYSMAL SUPRAVENTRICULAR TACHYCARDIA (H): ICD-10-CM

## 2020-08-12 DIAGNOSIS — R00.2 PALPITATIONS: Primary | ICD-10-CM

## 2020-08-12 PROCEDURE — 99214 OFFICE O/P EST MOD 30 MIN: CPT | Performed by: INTERNAL MEDICINE

## 2020-08-12 PROCEDURE — 93000 ELECTROCARDIOGRAM COMPLETE: CPT | Performed by: INTERNAL MEDICINE

## 2020-08-12 RX ORDER — CARBOXYMETHYLCELLULOSE SODIUM 10 MG/ML
GEL OPHTHALMIC
COMMUNITY
End: 2022-01-01

## 2020-08-12 RX ORDER — FLECAINIDE ACETATE 50 MG/1
50 TABLET ORAL 2 TIMES DAILY
Qty: 60 TABLET | Refills: 11 | Status: SHIPPED | OUTPATIENT
Start: 2020-08-12 | End: 2020-09-01

## 2020-08-12 ASSESSMENT — MIFFLIN-ST. JEOR: SCORE: 1310.57

## 2020-08-12 NOTE — PROGRESS NOTES
"Service Date: 08/12/2020      FOLLOWUP       HISTORY OF PRESENT ILLNESS:    I had the pleasure of seeing Ms. Yaneli Ramey, a 70-year-old female, for symptomatic nonsustained atrial arrhythmias.      Yaneli has the following medical issues:   A.  Symptomatic PACs, PVCs.   B.  Episodes of nonsustained SVT associated with lightheadedness leading to hospitalization in 2017.  Since then, she has been treated with flecainide.  Excellent suppression of this arrhythmia on flecainide.   C.  Dyslipidemia.      Ms. Ramey had a bout of increased palpitation around 05/2019.  She visited urgent care on a couple of occasions and no significant arrhythmia was documented.  Around that time she discovered that she was lactose intolerant.  She went onto an almost completely lactose-free diet with significant improvement in her palpitation.      She has no chest pain with exertion.  No orthopnea, PND or lower extremity edema.  She is a nonsmoker.  She exercises on a regular basis.      The patient stated that during exercise, her pulse almost never exceeds 100-105 beats per minute.  She feels as if \"something is holding it back.\"  She is wondering if it is a side-effect of flecainide.        PHYSICAL EXAMINATION:   VITAL SIGNS:  Blood pressure 134/88, pulse 76 and regular, weight 71 kg, height 178 cm.   GENERAL:  She is a pleasant, healthy-appearing female in no distress.   HEENT:  Head normocephalic, atraumatic.  Sclerae anicteric.  Mouth, oropharynx clear.   NECK:  Supple without thyromegaly, lymphadenopathy.  No carotid bruits.   LUNGS:  Clear.  No crackles or wheeze.   CARDIOVASCULAR:  Normal JVP, regular rhythm.  No gallop, murmur or rub.   ABDOMEN:  Soft, nontender.   EXTREMITIES:  No edema.        DIAGNOSTIC STUDIES:    Her 12-lead ECG today showed sinus rhythm and was within normal limits.  QRS duration 100 milliseconds.          IMPRESSION:    1.  Symptomatic ectopic beats and nonsustained SVT.  Yaneli has been on " "flecainide since her hospitalization with severe lightheadedness related to episodes of brief but fast atrial tachycardia (2017).  She has done well over the years but she is now experiencing chronotropic incompetence.  She is wondering whether she can decrease the dose.      It is reasonable to decrease flecainide.  She can try 50 mg b.i.d. or, if she prefers, 75 mg in the morning and 50 mg in the evening.  I told her that it is okay to self-adjust the dose of flecainide as needed.      She had multiple questions about various issues.  She seems anxious about her \"cardiac diagnosis\".  I reassured her that so far we have not detected significant cardiovascular disease and her arrhythmias are relatively minor.  Her most important risk is that, in time, she may develop atrial fibrillation.  She has purchased the Kardia[employer] device.  I asked her to give us a call if she experiences sustained arrhythmia.  I congratulated her on her healthy lifestyle.      RECOMMENDATIONS:   A.  May decrease flecainide to 50 mg b.i.d. or 75/50 mg every 12 hours.   B.  We plan to see her in followup in 2 years, sooner if she has problems in the interim.          It was my pleasure seeing this very pleasant patient.    Time spent was 25 minutes, >50% of the time was discussion.        CHRIS NETTLES MD, FACC         cc:   Kya Hodges NP    [facility]   [address] [place] [address]             D: 2020   T: 2020   MT: SHANTHI      Name:     ROBERT POOLE   MRN:      [id number]        Account:      [id number]   :      1950           Service Date: 2020      Document: [id number]      "

## 2020-08-12 NOTE — PROGRESS NOTES
HPI and Plan:   See dictation [id number]    Orders Placed This Encounter   Procedures     Follow-Up with Cardiac Advanced Practice Provider     EKG 12-lead complete w/read - Clinics (performed today)     EKG 12-lead complete w/read - Clinics (performed today)       Orders Placed This Encounter   Medications     Zinc 25 MG TABS     Carboxymethylcellulose Sodium (REFRESH LIQUIGEL OP)     flecainide (TAMBOCOR) 50 MG tablet     Sig: Take 1 tablet (50 mg) by mouth 2 times daily     Dispense:  60 tablet     Refill:  11       Medications Discontinued During This Encounter   Medication Reason     aspirin 81 MG tablet      flecainide (TAMBOCOR) 150 MG tablet          Encounter Diagnoses   Name Primary?     Palpitations Yes     Paroxysmal supraventricular tachycardia (H)        CURRENT MEDICATIONS:  Current Outpatient Medications   Medication Sig Dispense Refill     Carboxymethylcellulose Sodium (REFRESH LIQUIGEL OP)        flecainide (TAMBOCOR) 50 MG tablet Take 1 tablet (50 mg) by mouth 2 times daily 60 tablet 11     GLUCOSAMINE HCL-MSM PO Take by mouth daily       multivitamin, therapeutic (THERA-VIT) TABS tablet Take 1 tablet by mouth three times a week        Probiotic Product (PROBIOTIC DAILY PO) Take 1 capsule by mouth three times a week Takes one capsule Sunday, Tuesday, Thursday       Vitamin D, Cholecalciferol, 1000 UNITS CAPS Take by mouth daily       Zinc 25 MG TABS        MELATONIN-THEANINE PO Take 1 tablet by mouth At Bedtime         ALLERGIES     Allergies   Allergen Reactions     Adhesive Tape      Cromolyn      Estradiol      Percocet [Oxycodone-Acetaminophen]      Septra [Sulfamethoxazole W-Trimethoprim]        PAST MEDICAL HISTORY:  Past Medical History:   Diagnosis Date     Asthma      Gastro-oesophageal reflux disease      Paroxysmal supraventricular tachycardia (H)      Pneumonia      SVT (supraventricular tachycardia) (H)        PAST SURGICAL HISTORY:  Past Surgical History:   Procedure Laterality Date      BUNIONECTOMY       DISCECTOMY LUMBAR MINIMALLY INVASIVE TWO LEVELS       HYSTERECTOMY       REPAIR HAMMER TOE       REPAIR LIGAMENT ULNAR COLLATERAL (ELBOW)       REPAIR PTOSIS BILATERAL  12/23/2013    Procedure: REPAIR PTOSIS BILATERAL;  right upper lid ptosis repair and bilateral upper eyelid mechanical ptosis repair;  Surgeon: Santo Choudhury MD;  Location: [facility]       FAMILY HISTORY:  Family History   Problem Relation Age of Onset     Emphysema Father      Coronary Artery Disease Early Onset Father      Prostate Cancer Father      Lupus Mother        SOCIAL HISTORY:  Social History     Socioeconomic History     Marital status: Single     Spouse name: None     Number of children: None     Years of education: None     Highest education level: None   Occupational History     None   Social Needs     Financial resource strain: None     Food insecurity     Worry: None     Inability: None     Transportation needs     Medical: None     Non-medical: None   Tobacco Use     Smoking status: Former Smoker     Smokeless tobacco: Never Used   Substance and Sexual Activity     Alcohol use: Yes     Alcohol/week: 0.0 standard drinks     Comment: min.     Drug use: No     Sexual activity: None   Lifestyle     Physical activity     Days per week: None     Minutes per session: None     Stress: None   Relationships     Social connections     Talks on phone: None     Gets together: None     Attends [employer] service: None     Active member of club or organization: None     Attends meetings of clubs or organizations: None     Relationship status: None     Intimate partner violence     Fear of current or ex partner: None     Emotionally abused: None     Physically abused: None     Forced sexual activity: None   Other Topics Concern     Parent/sibling w/ CABG, MI or angioplasty before 65F 55M? Not Asked      Service Not Asked     Blood Transfusions Not Asked     Caffeine Concern Not Asked     Occupational Exposure Not Asked  "    Hobby Hazards Not Asked     Sleep Concern Not Asked     Stress Concern Not Asked     Weight Concern Not Asked     Special Diet No     Back Care Not Asked     Exercise No     Bike Helmet Not Asked     Seat Belt Not Asked     Self-Exams Not Asked   Social History Narrative     None       Review of Systems:  Skin:  Negative       Eyes:  Positive for glasses    ENT:  Negative      Respiratory:  Negative       Cardiovascular:    palpitations;Positive for    Gastroenterology: Negative      Genitourinary:  Negative      Musculoskeletal:  Positive for back pain;neck pain    Neurologic:  Negative      Psychiatric:  Negative      Heme/Lymph/Imm:  Negative      Endocrine:  Negative        Physical Exam:  Vitals: /88   Pulse 76   Ht 1.778 m (5' 10\")   Wt 71.5 kg (157 lb 11.2 oz)   LMP  (LMP Unknown)   Breastfeeding No   BMI 22.63 kg/m      Constitutional:           Skin:             Head:           Eyes:           Lymph:      ENT:           Neck:           Respiratory:            Cardiac:                                                           GI:           Extremities and Muscular Skeletal:                 Neurological:           Psych:           CC  José Miguel Martin MD  [address]  [place] [id number]              "

## 2020-08-12 NOTE — LETTER
"8/12/2020      Kya Hodges NP  [facility] [address] [facility] [address]      RE: Yaneli Ramey       Dear Colleague,    I had the pleasure of seeing Yaneli Ramey in the [facility].    Service Date: 08/12/2020      FOLLOWUP       HISTORY OF PRESENT ILLNESS:    I had the pleasure of seeing Ms. Yaneli Ramey, a 70-year-old female, for symptomatic nonsustained atrial arrhythmias.      Yaneli has the following medical issues:   A.  Symptomatic PACs, PVCs.   B.  Episodes of nonsustained SVT associated with lightheadedness leading to hospitalization in 2017.  Since then, she has been treated with flecainide.  Excellent suppression of this arrhythmia on flecainide.   C.  Dyslipidemia.      Ms. Ramey had a bout of increased palpitation around 05/2019.  She visited urgent care on a couple of occasions and no significant arrhythmia was documented.  Around that time she discovered that she was lactose intolerant.  She went onto an almost completely lactose-free diet with significant improvement in her palpitation.      She has no chest pain with exertion.  No orthopnea, PND or lower extremity edema.  She is a nonsmoker.  She exercises on a regular basis.      The patient stated that during exercise, her pulse almost never exceeds 100-105 beats per minute.  She feels as if \"something is holding it back.\"  She is wondering if it is a side-effect of flecainide.        PHYSICAL EXAMINATION:   VITAL SIGNS:  Blood pressure 134/88, pulse 76 and regular, weight 71 kg, height 178 cm.   GENERAL:  She is a pleasant, healthy-appearing female in no distress.   HEENT:  Head normocephalic, atraumatic.  Sclerae anicteric.  Mouth, oropharynx clear.   NECK:  Supple without thyromegaly, lymphadenopathy.  No carotid bruits.   LUNGS:  Clear.  No crackles or wheeze.   CARDIOVASCULAR:  Normal JVP, regular rhythm.  No gallop, murmur or rub.   ABDOMEN:  Soft, nontender.   EXTREMITIES:  " "No edema.        DIAGNOSTIC STUDIES:    Her 12-lead ECG today showed sinus rhythm and was within normal limits.  QRS duration 100 milliseconds.          IMPRESSION:    1.  Symptomatic ectopic beats and nonsustained SVT.  Yaneli has been on flecainide since her hospitalization with severe lightheadedness related to episodes of brief but fast atrial tachycardia (2017).  She has done well over the years but she is now experiencing chronotropic incompetence.  She is wondering whether she can decrease the dose.      It is reasonable to decrease flecainide.  She can try 50 mg b.i.d. or, if she prefers, 75 mg in the morning and 50 mg in the evening.  I told her that it is okay to self-adjust the dose of flecainide as needed.      She had multiple questions about various issues.  She seems anxious about her \"cardiac diagnosis\".  I reassured her that so far we have not detected significant cardiovascular disease and her arrhythmias are relatively minor.  Her most important risk is that, in time, she may develop atrial fibrillation.  She has purchased the K[employer]ia[employer] device.  I asked her to give us a call if she experiences sustained arrhythmia.  I congratulated her on her healthy lifestyle.      RECOMMENDATIONS:   A.  May decrease flecainide to 50 mg b.i.d. or 75/50 mg every 12 hours.   B.  We plan to see her in followup in 2 years, sooner if she has problems in the interim.          It was my pleasure seeing this very pleasant patient.    Time spent was 25 minutes, >50% of the time was discussion.        CHRIS NETTLES MD, FACC         cc:   Kya Hodges NP    [address] [address]             D: 2020   T: 2020   MT: SHANTHI      Name:     YANELI POOLE   MRN:      [id number]        Account:      [id number]   :      1950           Service Date: 2020      Document: [id number]           Outpatient Encounter Medications as of 2020 "   Medication Sig Dispense Refill     Carboxymethylcellulose Sodium (REFRESH LIQUIGEL OP)        flecainide (TAMBOCOR) 50 MG tablet Take 1 tablet (50 mg) by mouth 2 times daily 60 tablet 11     GLUCOSAMINE HCL-MSM PO Take by mouth daily       multivitamin, therapeutic (THERA-VIT) TABS tablet Take 1 tablet by mouth three times a week        Probiotic Product (PROBIOTIC DAILY PO) Take 1 capsule by mouth three times a week Takes one capsule Sunday, Tuesday, Thursday       Vitamin D, Cholecalciferol, 1000 UNITS CAPS Take by mouth daily       Zinc 25 MG TABS        MELATONIN-THEANINE PO Take 1 tablet by mouth At Bedtime       [DISCONTINUED] aspirin 81 MG tablet Take 81 mg by mouth three times a week        [DISCONTINUED] flecainide (TAMBOCOR) 150 MG tablet Take 1/2 tablet (75 mg) by mouth twice daily 90 tablet 0     No facility-administered encounter medications on file as of 8/12/2020.        Again, thank you for allowing me to participate in the care of your patient.      Sincerely,    José Miguel Martin MD     [facility]

## 2020-08-12 NOTE — LETTER
8/12/2020    Kya Hodges NP  [facility] [address] [facility] [address]    RE: Yaneli Ramey       Dear Colleague,    I had the pleasure of seeing Yaneli Ramey in the [facility].    HPI and Plan:   See dictation [id number]    Orders Placed This Encounter   Procedures     Follow-Up with Cardiac Advanced Practice Provider     EKG 12-lead complete w/read - Clinics (performed today)     EKG 12-lead complete w/read - Clinics (performed today)       Orders Placed This Encounter   Medications     Zinc 25 MG TABS     Carboxymethylcellulose Sodium (REFRESH LIQUIGEL OP)     flecainide (TAMBOCOR) 50 MG tablet     Sig: Take 1 tablet (50 mg) by mouth 2 times daily     Dispense:  60 tablet     Refill:  11       Medications Discontinued During This Encounter   Medication Reason     aspirin 81 MG tablet      flecainide (TAMBOCOR) 150 MG tablet          Encounter Diagnoses   Name Primary?     Palpitations Yes     Paroxysmal supraventricular tachycardia (H)        CURRENT MEDICATIONS:  Current Outpatient Medications   Medication Sig Dispense Refill     Carboxymethylcellulose Sodium (REFRESH LIQUIGEL OP)        flecainide (TAMBOCOR) 50 MG tablet Take 1 tablet (50 mg) by mouth 2 times daily 60 tablet 11     GLUCOSAMINE HCL-MSM PO Take by mouth daily       multivitamin, therapeutic (THERA-VIT) TABS tablet Take 1 tablet by mouth three times a week        Probiotic Product (PROBIOTIC DAILY PO) Take 1 capsule by mouth three times a week Takes one capsule Sunday, Tuesday, Thursday       Vitamin D, Cholecalciferol, 1000 UNITS CAPS Take by mouth daily       Zinc 25 MG TABS        MELATONIN-THEANINE PO Take 1 tablet by mouth At Bedtime         ALLERGIES     Allergies   Allergen Reactions     Adhesive Tape      Cromolyn      Estradiol      Percocet [Oxycodone-Acetaminophen]      Septra [Sulfamethoxazole W-Trimethoprim]        PAST MEDICAL HISTORY:  Past Medical History:   Diagnosis Date      Asthma      Gastro-oesophageal reflux disease      Paroxysmal supraventricular tachycardia (H)      Pneumonia      SVT (supraventricular tachycardia) (H)        PAST SURGICAL HISTORY:  Past Surgical History:   Procedure Laterality Date     BUNIONECTOMY       DISCECTOMY LUMBAR MINIMALLY INVASIVE TWO LEVELS       HYSTERECTOMY       REPAIR HAMMER TOE       REPAIR LIGAMENT ULNAR COLLATERAL (ELBOW)       REPAIR PTOSIS BILATERAL  12/23/2013    Procedure: REPAIR PTOSIS BILATERAL;  right upper lid ptosis repair and bilateral upper eyelid mechanical ptosis repair;  Surgeon: Santo Choudhury MD;  Location: [facility]       FAMILY HISTORY:  Family History   Problem Relation Age of Onset     Emphysema Father      Coronary Artery Disease Early Onset Father      Prostate Cancer Father      Lupus Mother        SOCIAL HISTORY:  Social History     Socioeconomic History     Marital status: Single     Spouse name: None     Number of children: None     Years of education: None     Highest education level: None   Occupational History     None   Social Needs     Financial resource strain: None     Food insecurity     Worry: None     Inability: None     Transportation needs     Medical: None     Non-medical: None   Tobacco Use     Smoking status: Former Smoker     Smokeless tobacco: Never Used   Substance and Sexual Activity     Alcohol use: Yes     Alcohol/week: 0.0 standard drinks     Comment: min.     Drug use: No     Sexual activity: None   Lifestyle     Physical activity     Days per week: None     Minutes per session: None     Stress: None   Relationships     Social connections     Talks on phone: None     Gets together: None     Attends Congregation service: None     Active member of club or organization: None     Attends meetings of clubs or organizations: None     Relationship status: None     Intimate partner violence     Fear of current or ex partner: None     Emotionally abused: None     Physically abused: None     Forced  "sexual activity: None   Other Topics Concern     Parent/sibling w/ CABG, MI or angioplasty before 65F 55M? Not Asked      Service Not Asked     Blood Transfusions Not Asked     Caffeine Concern Not Asked     Occupational Exposure Not Asked     Hobby Hazards Not Asked     Sleep Concern Not Asked     Stress Concern Not Asked     Weight Concern Not Asked     Special Diet No     Back Care Not Asked     Exercise No     Bike Helmet Not Asked     Seat Belt Not Asked     Self-Exams Not Asked   Social History Narrative     None       Review of Systems:  Skin:  Negative       Eyes:  Positive for glasses    ENT:  Negative      Respiratory:  Negative       Cardiovascular:    palpitations;Positive for    Gastroenterology: Negative      Genitourinary:  Negative      Musculoskeletal:  Positive for back pain;neck pain    Neurologic:  Negative      Psychiatric:  Negative      Heme/Lymph/Imm:  Negative      Endocrine:  Negative        Physical Exam:  Vitals: /88   Pulse 76   Ht 1.778 m (5' 10\")   Wt 71.5 kg (157 lb 11.2 oz)   LMP  (LMP Unknown)   Breastfeeding No   BMI 22.63 kg/m      Constitutional:           Skin:             Head:           Eyes:           Lymph:      ENT:           Neck:           Respiratory:            Cardiac:                                                           GI:           Extremities and Muscular Skeletal:                 Neurological:           Psych:           CC  José Miguel Martin MD  [address]  [place] [address]                Thank you for allowing me to participate in the care of your patient.      Sincerely,     José Miguel Martin MD     [facility]    cc:   José Miguel Martin MD  [address]  [place] [address]        "

## 2020-09-01 ENCOUNTER — TELEPHONE (OUTPATIENT)
Dept: CARDIOLOGY | Facility: CLINIC | Age: 70
End: 2020-09-01

## 2020-09-01 DIAGNOSIS — I47.10 PAROXYSMAL SUPRAVENTRICULAR TACHYCARDIA (H): ICD-10-CM

## 2020-09-01 DIAGNOSIS — R00.2 PALPITATIONS: ICD-10-CM

## 2020-09-01 RX ORDER — FLECAINIDE ACETATE 50 MG/1
50 TABLET ORAL 2 TIMES DAILY
Qty: 180 TABLET | Refills: 3 | Status: SHIPPED | OUTPATIENT
Start: 2020-09-01 | End: 2021-07-13

## 2021-01-01 ENCOUNTER — TELEPHONE (OUTPATIENT)
Dept: CARDIOLOGY | Facility: CLINIC | Age: 71
End: 2021-01-01
Payer: COMMERCIAL

## 2021-01-01 ENCOUNTER — ANESTHESIA EVENT (OUTPATIENT)
Dept: SURGERY | Facility: CLINIC | Age: 71
End: 2021-01-01
Payer: COMMERCIAL

## 2021-01-01 ENCOUNTER — MYC MEDICAL ADVICE (OUTPATIENT)
Dept: CARDIOLOGY | Facility: CLINIC | Age: 71
End: 2021-01-01
Payer: COMMERCIAL

## 2021-01-01 ENCOUNTER — OFFICE VISIT (OUTPATIENT)
Dept: CARDIOLOGY | Facility: CLINIC | Age: 71
End: 2021-01-01
Payer: COMMERCIAL

## 2021-01-01 ENCOUNTER — ANESTHESIA (OUTPATIENT)
Dept: SURGERY | Facility: CLINIC | Age: 71
End: 2021-01-01
Payer: COMMERCIAL

## 2021-01-01 ENCOUNTER — TRANSFERRED RECORDS (OUTPATIENT)
Dept: HEALTH INFORMATION MANAGEMENT | Facility: CLINIC | Age: 71
End: 2021-01-01
Payer: COMMERCIAL

## 2021-01-01 ENCOUNTER — DOCUMENTATION ONLY (OUTPATIENT)
Dept: OTHER | Facility: CLINIC | Age: 71
End: 2021-01-01
Payer: COMMERCIAL

## 2021-01-01 ENCOUNTER — HOSPITAL ENCOUNTER (OUTPATIENT)
Facility: CLINIC | Age: 71
Discharge: HOME | End: 2021-12-09
Attending: OBSTETRICS & GYNECOLOGY | Admitting: OBSTETRICS & GYNECOLOGY
Payer: COMMERCIAL

## 2021-01-01 ENCOUNTER — HOSPITAL ENCOUNTER (OUTPATIENT)
Facility: CLINIC | Age: 71
End: 2021-01-01
Attending: THORACIC SURGERY (CARDIOTHORACIC VASCULAR SURGERY) | Admitting: THORACIC SURGERY (CARDIOTHORACIC VASCULAR SURGERY)
Payer: COMMERCIAL

## 2021-01-01 ENCOUNTER — LAB (OUTPATIENT)
Dept: LAB | Facility: CLINIC | Age: 71
End: 2021-01-01
Payer: COMMERCIAL

## 2021-01-01 VITALS
SYSTOLIC BLOOD PRESSURE: 126 MMHG | HEART RATE: 82 BPM | WEIGHT: 143.8 LBS | OXYGEN SATURATION: 96 % | DIASTOLIC BLOOD PRESSURE: 80 MMHG | BODY MASS INDEX: 20.59 KG/M2 | HEIGHT: 70 IN

## 2021-01-01 VITALS
SYSTOLIC BLOOD PRESSURE: 139 MMHG | RESPIRATION RATE: 13 BRPM | DIASTOLIC BLOOD PRESSURE: 80 MMHG | HEIGHT: 70 IN | BODY MASS INDEX: 20.19 KG/M2 | OXYGEN SATURATION: 92 % | HEART RATE: 80 BPM | WEIGHT: 141 LBS | TEMPERATURE: 98.3 F

## 2021-01-01 DIAGNOSIS — R00.2 PALPITATIONS: ICD-10-CM

## 2021-01-01 DIAGNOSIS — I48.0 PAROXYSMAL ATRIAL FIBRILLATION (H): Primary | ICD-10-CM

## 2021-01-01 DIAGNOSIS — Z11.59 ENCOUNTER FOR SCREENING FOR OTHER VIRAL DISEASES: ICD-10-CM

## 2021-01-01 DIAGNOSIS — C80.0 CARCINOMATOSIS (H): Primary | ICD-10-CM

## 2021-01-01 DIAGNOSIS — I47.19 ATRIAL TACHYCARDIA (H): Primary | ICD-10-CM

## 2021-01-01 DIAGNOSIS — I47.10 PAROXYSMAL SUPRAVENTRICULAR TACHYCARDIA (H): ICD-10-CM

## 2021-01-01 LAB — SARS-COV-2 RNA RESP QL NAA+PROBE: NEGATIVE

## 2021-01-01 PROCEDURE — 250N000011 HC RX IP 250 OP 636: Performed by: OBSTETRICS & GYNECOLOGY

## 2021-01-01 PROCEDURE — 250N000011 HC RX IP 250 OP 636: Performed by: NURSE ANESTHETIST, CERTIFIED REGISTERED

## 2021-01-01 PROCEDURE — 250N000009 HC RX 250: Performed by: NURSE ANESTHETIST, CERTIFIED REGISTERED

## 2021-01-01 PROCEDURE — 250N000025 HC SEVOFLURANE, PER MIN: Performed by: OBSTETRICS & GYNECOLOGY

## 2021-01-01 PROCEDURE — 258N000001 HC RX 258: Performed by: OBSTETRICS & GYNECOLOGY

## 2021-01-01 PROCEDURE — 710N000009 HC RECOVERY PHASE 1, LEVEL 1, PER MIN: Performed by: OBSTETRICS & GYNECOLOGY

## 2021-01-01 PROCEDURE — 999N000141 HC STATISTIC PRE-PROCEDURE NURSING ASSESSMENT: Performed by: OBSTETRICS & GYNECOLOGY

## 2021-01-01 PROCEDURE — 258N000003 HC RX IP 258 OP 636: Performed by: ANESTHESIOLOGY

## 2021-01-01 PROCEDURE — 250N000013 HC RX MED GY IP 250 OP 250 PS 637: Performed by: OBSTETRICS & GYNECOLOGY

## 2021-01-01 PROCEDURE — U0005 INFEC AGEN DETEC AMPLI PROBE: HCPCS

## 2021-01-01 PROCEDURE — 370N000017 HC ANESTHESIA TECHNICAL FEE, PER MIN: Performed by: OBSTETRICS & GYNECOLOGY

## 2021-01-01 PROCEDURE — 99214 OFFICE O/P EST MOD 30 MIN: CPT | Mod: 25 | Performed by: INTERNAL MEDICINE

## 2021-01-01 PROCEDURE — 93010 ELECTROCARDIOGRAM REPORT: CPT | Performed by: INTERNAL MEDICINE

## 2021-01-01 PROCEDURE — 710N000012 HC RECOVERY PHASE 2, PER MINUTE: Performed by: OBSTETRICS & GYNECOLOGY

## 2021-01-01 PROCEDURE — 250N000009 HC RX 250: Performed by: OBSTETRICS & GYNECOLOGY

## 2021-01-01 PROCEDURE — 360N000076 HC SURGERY LEVEL 3, PER MIN: Performed by: OBSTETRICS & GYNECOLOGY

## 2021-01-01 PROCEDURE — 93000 ELECTROCARDIOGRAM COMPLETE: CPT

## 2021-01-01 PROCEDURE — 272N000001 HC OR GENERAL SUPPLY STERILE: Performed by: OBSTETRICS & GYNECOLOGY

## 2021-01-01 RX ORDER — PROPOFOL 10 MG/ML
INJECTION, EMULSION INTRAVENOUS CONTINUOUS PRN
Status: DISCONTINUED | OUTPATIENT
Start: 2021-01-01 | End: 2021-01-01

## 2021-01-01 RX ORDER — FLECAINIDE ACETATE 50 MG/1
TABLET ORAL
Qty: 360 TABLET | Refills: 3 | Status: ON HOLD | OUTPATIENT
Start: 2021-01-01 | End: 2022-01-01

## 2021-01-01 RX ORDER — ONDANSETRON 2 MG/ML
4 INJECTION INTRAMUSCULAR; INTRAVENOUS EVERY 30 MIN PRN
Status: DISCONTINUED | OUTPATIENT
Start: 2021-01-01 | End: 2021-01-01 | Stop reason: HOSPADM

## 2021-01-01 RX ORDER — FLAXSEED
1 POWDER (GRAM) ORAL EVERY MORNING
COMMUNITY
End: 2022-01-01

## 2021-01-01 RX ORDER — HYDROMORPHONE HYDROCHLORIDE 2 MG/1
1-2 TABLET ORAL EVERY 6 HOURS PRN
Qty: 5 TABLET | Refills: 0 | Status: SHIPPED | OUTPATIENT
Start: 2021-01-01 | End: 2021-01-01

## 2021-01-01 RX ORDER — BUPIVACAINE HYDROCHLORIDE 2.5 MG/ML
INJECTION, SOLUTION INFILTRATION; PERINEURAL PRN
Status: DISCONTINUED | OUTPATIENT
Start: 2021-01-01 | End: 2021-01-01 | Stop reason: HOSPADM

## 2021-01-01 RX ORDER — FLECAINIDE ACETATE 50 MG/1
TABLET ORAL
Qty: 120 TABLET | Refills: 11 | Status: SHIPPED | OUTPATIENT
Start: 2021-01-01 | End: 2021-01-01

## 2021-01-01 RX ORDER — FLUTICASONE PROPIONATE 50 MCG
1 SPRAY, SUSPENSION (ML) NASAL DAILY PRN
COMMUNITY

## 2021-01-01 RX ORDER — METHYLCELLULOSE 2 G/19G
1.5 POWDER, FOR SOLUTION ORAL DAILY PRN
COMMUNITY

## 2021-01-01 RX ORDER — IBUPROFEN 600 MG/1
600 TABLET, FILM COATED ORAL ONCE
Status: DISCONTINUED | OUTPATIENT
Start: 2021-01-01 | End: 2021-01-01 | Stop reason: HOSPADM

## 2021-01-01 RX ORDER — FLECAINIDE ACETATE 150 MG/1
TABLET ORAL
Qty: 90 TABLET | Refills: 1 | Status: SHIPPED | OUTPATIENT
Start: 2021-01-01 | End: 2021-01-01 | Stop reason: DRUGHIGH

## 2021-01-01 RX ORDER — ACETAMINOPHEN 325 MG/1
975 TABLET ORAL ONCE
Status: COMPLETED | OUTPATIENT
Start: 2021-01-01 | End: 2021-01-01

## 2021-01-01 RX ORDER — ACETAMINOPHEN 325 MG/1
975 TABLET ORAL ONCE
Status: DISCONTINUED | OUTPATIENT
Start: 2021-01-01 | End: 2021-01-01 | Stop reason: HOSPADM

## 2021-01-01 RX ORDER — POLYETHYLENE GLYCOL 3350 17 G/17G
1 POWDER, FOR SOLUTION ORAL DAILY PRN
COMMUNITY

## 2021-01-01 RX ORDER — SODIUM CHLORIDE, SODIUM LACTATE, POTASSIUM CHLORIDE, CALCIUM CHLORIDE 600; 310; 30; 20 MG/100ML; MG/100ML; MG/100ML; MG/100ML
INJECTION, SOLUTION INTRAVENOUS CONTINUOUS
Status: DISCONTINUED | OUTPATIENT
Start: 2021-01-01 | End: 2021-01-01 | Stop reason: HOSPADM

## 2021-01-01 RX ORDER — DEXAMETHASONE SODIUM PHOSPHATE 4 MG/ML
INJECTION, SOLUTION INTRA-ARTICULAR; INTRALESIONAL; INTRAMUSCULAR; INTRAVENOUS; SOFT TISSUE PRN
Status: DISCONTINUED | OUTPATIENT
Start: 2021-01-01 | End: 2021-01-01

## 2021-01-01 RX ORDER — ONDANSETRON 4 MG/1
4 TABLET, ORALLY DISINTEGRATING ORAL EVERY 30 MIN PRN
Status: DISCONTINUED | OUTPATIENT
Start: 2021-01-01 | End: 2021-01-01 | Stop reason: HOSPADM

## 2021-01-01 RX ORDER — DIPHENHYDRAMINE HCL 12.5MG/5ML
LIQUID (ML) ORAL 4 TIMES DAILY PRN
COMMUNITY
End: 2022-01-01

## 2021-01-01 RX ORDER — LIDOCAINE HYDROCHLORIDE 20 MG/ML
INJECTION, SOLUTION INFILTRATION; PERINEURAL PRN
Status: DISCONTINUED | OUTPATIENT
Start: 2021-01-01 | End: 2021-01-01

## 2021-01-01 RX ORDER — CEFAZOLIN SODIUM 2 G/100ML
2 INJECTION, SOLUTION INTRAVENOUS
Status: COMPLETED | OUTPATIENT
Start: 2021-01-01 | End: 2021-01-01

## 2021-01-01 RX ORDER — FENTANYL CITRATE 0.05 MG/ML
25 INJECTION, SOLUTION INTRAMUSCULAR; INTRAVENOUS EVERY 5 MIN PRN
Status: DISCONTINUED | OUTPATIENT
Start: 2021-01-01 | End: 2021-01-01 | Stop reason: HOSPADM

## 2021-01-01 RX ORDER — PROPOFOL 10 MG/ML
INJECTION, EMULSION INTRAVENOUS PRN
Status: DISCONTINUED | OUTPATIENT
Start: 2021-01-01 | End: 2021-01-01

## 2021-01-01 RX ORDER — CEFAZOLIN SODIUM 2 G/100ML
2 INJECTION, SOLUTION INTRAVENOUS SEE ADMIN INSTRUCTIONS
Status: DISCONTINUED | OUTPATIENT
Start: 2021-01-01 | End: 2021-01-01 | Stop reason: HOSPADM

## 2021-01-01 RX ORDER — MAGNESIUM HYDROXIDE 1200 MG/15ML
LIQUID ORAL PRN
Status: DISCONTINUED | OUTPATIENT
Start: 2021-01-01 | End: 2021-01-01 | Stop reason: HOSPADM

## 2021-01-01 RX ORDER — FENTANYL CITRATE 50 UG/ML
INJECTION, SOLUTION INTRAMUSCULAR; INTRAVENOUS PRN
Status: DISCONTINUED | OUTPATIENT
Start: 2021-01-01 | End: 2021-01-01

## 2021-01-01 RX ORDER — ONDANSETRON 2 MG/ML
INJECTION INTRAMUSCULAR; INTRAVENOUS PRN
Status: DISCONTINUED | OUTPATIENT
Start: 2021-01-01 | End: 2021-01-01

## 2021-01-01 RX ORDER — TRAMADOL HYDROCHLORIDE 50 MG/1
50 TABLET ORAL
COMMUNITY
Start: 2021-01-01 | End: 2021-01-01 | Stop reason: HOSPADM

## 2021-01-01 RX ORDER — EPINEPHRINE 0.15 MG/.15ML
0.15 INJECTION SUBCUTANEOUS PRN
COMMUNITY
End: 2021-01-01

## 2021-01-01 RX ORDER — SENNA AND DOCUSATE SODIUM 50; 8.6 MG/1; MG/1
1-2 TABLET, FILM COATED ORAL 2 TIMES DAILY
Qty: 20 TABLET | Refills: 0 | Status: SHIPPED | OUTPATIENT
Start: 2021-01-01 | End: 2022-01-01

## 2021-01-01 RX ORDER — OMEGA-3 FATTY ACIDS/FISH OIL 300-1000MG
200 CAPSULE ORAL EVERY 4 HOURS PRN
COMMUNITY
End: 2022-01-01

## 2021-01-01 RX ORDER — POLYETHYLENE GLYCOL 3350 17 G/17G
POWDER, FOR SOLUTION ORAL
COMMUNITY
End: 2021-01-01 | Stop reason: HOSPADM

## 2021-01-01 RX ADMIN — ROCURONIUM BROMIDE 10 MG: 50 INJECTION, SOLUTION INTRAVENOUS at 13:53

## 2021-01-01 RX ADMIN — SODIUM CHLORIDE, POTASSIUM CHLORIDE, SODIUM LACTATE AND CALCIUM CHLORIDE: 600; 310; 30; 20 INJECTION, SOLUTION INTRAVENOUS at 14:03

## 2021-01-01 RX ADMIN — SUGAMMADEX 200 MG: 100 INJECTION, SOLUTION INTRAVENOUS at 14:12

## 2021-01-01 RX ADMIN — ONDANSETRON 4 MG: 2 INJECTION INTRAMUSCULAR; INTRAVENOUS at 14:07

## 2021-01-01 RX ADMIN — SODIUM CHLORIDE, POTASSIUM CHLORIDE, SODIUM LACTATE AND CALCIUM CHLORIDE: 600; 310; 30; 20 INJECTION, SOLUTION INTRAVENOUS at 12:16

## 2021-01-01 RX ADMIN — PROPOFOL 30 MCG/KG/MIN: 10 INJECTION, EMULSION INTRAVENOUS at 13:40

## 2021-01-01 RX ADMIN — DEXAMETHASONE SODIUM PHOSPHATE 4 MG: 4 INJECTION, SOLUTION INTRA-ARTICULAR; INTRALESIONAL; INTRAMUSCULAR; INTRAVENOUS; SOFT TISSUE at 13:40

## 2021-01-01 RX ADMIN — CEFAZOLIN SODIUM 2 G: 2 INJECTION, SOLUTION INTRAVENOUS at 13:22

## 2021-01-01 RX ADMIN — FENTANYL CITRATE 50 MCG: 50 INJECTION, SOLUTION INTRAMUSCULAR; INTRAVENOUS at 13:57

## 2021-01-01 RX ADMIN — FENTANYL CITRATE 50 MCG: 50 INJECTION, SOLUTION INTRAMUSCULAR; INTRAVENOUS at 13:27

## 2021-01-01 RX ADMIN — ACETAMINOPHEN 975 MG: 325 TABLET, FILM COATED ORAL at 11:39

## 2021-01-01 RX ADMIN — PROPOFOL 80 MG: 10 INJECTION, EMULSION INTRAVENOUS at 13:34

## 2021-01-01 RX ADMIN — LIDOCAINE HYDROCHLORIDE 50 MG: 20 INJECTION, SOLUTION INFILTRATION; PERINEURAL at 13:34

## 2021-01-01 RX ADMIN — ROCURONIUM BROMIDE 40 MG: 50 INJECTION, SOLUTION INTRAVENOUS at 13:34

## 2021-01-01 ASSESSMENT — ACTIVITIES OF DAILY LIVING (ADL)
ADLS_ACUITY_SCORE: 5
ADLS_ACUITY_SCORE: 10
ADLS_ACUITY_SCORE: 5

## 2021-01-01 ASSESSMENT — ENCOUNTER SYMPTOMS: DYSRHYTHMIAS: 1

## 2021-01-01 ASSESSMENT — MIFFLIN-ST. JEOR
SCORE: 1229.82
SCORE: 1242.52

## 2021-01-01 ASSESSMENT — LIFESTYLE VARIABLES: TOBACCO_USE: 1

## 2021-01-15 ENCOUNTER — HEALTH MAINTENANCE LETTER (OUTPATIENT)
Age: 71
End: 2021-01-15

## 2021-04-15 ENCOUNTER — TELEPHONE (OUTPATIENT)
Dept: CARDIOLOGY | Facility: CLINIC | Age: 71
End: 2021-04-15

## 2021-04-15 ENCOUNTER — MYC MEDICAL ADVICE (OUTPATIENT)
Dept: CARDIOLOGY | Facility: CLINIC | Age: 71
End: 2021-04-15

## 2021-04-15 ENCOUNTER — HOSPITAL ENCOUNTER (EMERGENCY)
Facility: CLINIC | Age: 71
Discharge: HOME | End: 2021-04-15
Attending: EMERGENCY MEDICINE | Admitting: EMERGENCY MEDICINE
Payer: COMMERCIAL

## 2021-04-15 ENCOUNTER — APPOINTMENT (OUTPATIENT)
Dept: CARDIOLOGY | Facility: CLINIC | Age: 71
End: 2021-04-15
Attending: EMERGENCY MEDICINE
Payer: COMMERCIAL

## 2021-04-15 VITALS
SYSTOLIC BLOOD PRESSURE: 134 MMHG | DIASTOLIC BLOOD PRESSURE: 78 MMHG | OXYGEN SATURATION: 98 % | HEART RATE: 75 BPM | RESPIRATION RATE: 16 BRPM | TEMPERATURE: 96.9 F

## 2021-04-15 DIAGNOSIS — R42 LIGHTHEADEDNESS: ICD-10-CM

## 2021-04-15 DIAGNOSIS — R00.2 PALPITATIONS: ICD-10-CM

## 2021-04-15 LAB
ALBUMIN UR-MCNC: NEGATIVE MG/DL
ANION GAP SERPL CALCULATED.3IONS-SCNC: 5 MMOL/L (ref 3–14)
APPEARANCE UR: CLEAR
BASOPHILS # BLD AUTO: 0 10E9/L (ref 0–0.2)
BASOPHILS NFR BLD AUTO: 0.6 %
BILIRUB UR QL STRIP: NEGATIVE
BUN SERPL-MCNC: 14 MG/DL (ref 7–30)
CALCIUM SERPL-MCNC: 9.2 MG/DL (ref 8.5–10.1)
CHLORIDE SERPL-SCNC: 106 MMOL/L (ref 94–109)
CO2 SERPL-SCNC: 27 MMOL/L (ref 20–32)
COLOR UR AUTO: ABNORMAL
CREAT SERPL-MCNC: 0.9 MG/DL (ref 0.52–1.04)
DIFFERENTIAL METHOD BLD: ABNORMAL
EOSINOPHIL # BLD AUTO: 0.1 10E9/L (ref 0–0.7)
EOSINOPHIL NFR BLD AUTO: 1.2 %
ERYTHROCYTE [DISTWIDTH] IN BLOOD BY AUTOMATED COUNT: 13.5 % (ref 10–15)
GFR SERPL CREATININE-BSD FRML MDRD: 64 ML/MIN/{1.73_M2}
GLUCOSE SERPL-MCNC: 118 MG/DL (ref 70–99)
GLUCOSE UR STRIP-MCNC: NEGATIVE MG/DL
HCT VFR BLD AUTO: 47.7 % (ref 35–47)
HGB BLD-MCNC: 15.3 G/DL (ref 11.7–15.7)
HGB UR QL STRIP: NEGATIVE
IMM GRANULOCYTES # BLD: 0 10E9/L (ref 0–0.4)
IMM GRANULOCYTES NFR BLD: 0.3 %
INTERPRETATION ECG - MUSE: NORMAL
KETONES UR STRIP-MCNC: NEGATIVE MG/DL
LEUKOCYTE ESTERASE UR QL STRIP: NEGATIVE
LYMPHOCYTES # BLD AUTO: 2.3 10E9/L (ref 0.8–5.3)
LYMPHOCYTES NFR BLD AUTO: 34.3 %
MCH RBC QN AUTO: 30.5 PG (ref 26.5–33)
MCHC RBC AUTO-ENTMCNC: 32.1 G/DL (ref 31.5–36.5)
MCV RBC AUTO: 95 FL (ref 78–100)
MONOCYTES # BLD AUTO: 0.5 10E9/L (ref 0–1.3)
MONOCYTES NFR BLD AUTO: 7.9 %
MUCOUS THREADS #/AREA URNS LPF: PRESENT /LPF
NEUTROPHILS # BLD AUTO: 3.7 10E9/L (ref 1.6–8.3)
NEUTROPHILS NFR BLD AUTO: 55.7 %
NITRATE UR QL: NEGATIVE
NRBC # BLD AUTO: 0 10*3/UL
NRBC BLD AUTO-RTO: 0 /100
PH UR STRIP: 5.5 PH (ref 5–7)
PLATELET # BLD AUTO: 243 10E9/L (ref 150–450)
PLATELET # BLD EST: ABNORMAL 10*3/UL
POTASSIUM SERPL-SCNC: 3.2 MMOL/L (ref 3.4–5.3)
RBC # BLD AUTO: 5.01 10E12/L (ref 3.8–5.2)
RBC #/AREA URNS AUTO: <1 /HPF (ref 0–2)
RBC MORPH BLD: ABNORMAL
SODIUM SERPL-SCNC: 138 MMOL/L (ref 133–144)
SOURCE: ABNORMAL
SP GR UR STRIP: 1 (ref 1–1.03)
TROPONIN I SERPL-MCNC: <0.015 UG/L (ref 0–0.04)
TROPONIN I SERPL-MCNC: <0.015 UG/L (ref 0–0.04)
TSH SERPL DL<=0.005 MIU/L-ACNC: 1.87 MU/L (ref 0.4–4)
UROBILINOGEN UR STRIP-MCNC: NORMAL MG/DL (ref 0–2)
WBC # BLD AUTO: 6.6 10E9/L (ref 4–11)
WBC #/AREA URNS AUTO: 1 /HPF (ref 0–5)

## 2021-04-15 PROCEDURE — 81001 URINALYSIS AUTO W/SCOPE: CPT | Performed by: EMERGENCY MEDICINE

## 2021-04-15 PROCEDURE — 84443 ASSAY THYROID STIM HORMONE: CPT | Performed by: EMERGENCY MEDICINE

## 2021-04-15 PROCEDURE — 84484 ASSAY OF TROPONIN QUANT: CPT | Mod: 91 | Performed by: EMERGENCY MEDICINE

## 2021-04-15 PROCEDURE — 250N000013 HC RX MED GY IP 250 OP 250 PS 637: Performed by: EMERGENCY MEDICINE

## 2021-04-15 PROCEDURE — 93242 EXT ECG>48HR<7D RECORDING: CPT

## 2021-04-15 PROCEDURE — 96360 HYDRATION IV INFUSION INIT: CPT

## 2021-04-15 PROCEDURE — 93005 ELECTROCARDIOGRAM TRACING: CPT

## 2021-04-15 PROCEDURE — 258N000003 HC RX IP 258 OP 636: Performed by: EMERGENCY MEDICINE

## 2021-04-15 PROCEDURE — 99284 EMERGENCY DEPT VISIT MOD MDM: CPT | Mod: 25

## 2021-04-15 PROCEDURE — 80048 BASIC METABOLIC PNL TOTAL CA: CPT | Performed by: EMERGENCY MEDICINE

## 2021-04-15 PROCEDURE — 85025 COMPLETE CBC W/AUTO DIFF WBC: CPT | Performed by: EMERGENCY MEDICINE

## 2021-04-15 PROCEDURE — 93244 EXT ECG>48HR<7D REV&INTERPJ: CPT | Performed by: INTERNAL MEDICINE

## 2021-04-15 RX ORDER — HYDROXYZINE HYDROCHLORIDE 25 MG/1
25 TABLET, FILM COATED ORAL ONCE
Status: COMPLETED | OUTPATIENT
Start: 2021-04-15 | End: 2021-04-15

## 2021-04-15 RX ORDER — SODIUM CHLORIDE 9 MG/ML
1000 INJECTION, SOLUTION INTRAVENOUS CONTINUOUS
Status: DISCONTINUED | OUTPATIENT
Start: 2021-04-15 | End: 2021-04-15 | Stop reason: HOSPADM

## 2021-04-15 RX ADMIN — HYDROXYZINE HYDROCHLORIDE 25 MG: 25 TABLET, FILM COATED ORAL at 12:14

## 2021-04-15 RX ADMIN — SODIUM CHLORIDE 500 ML: 9 INJECTION, SOLUTION INTRAVENOUS at 10:12

## 2021-04-15 ASSESSMENT — ENCOUNTER SYMPTOMS
HEADACHES: 0
NECK PAIN: 0
NUMBNESS: 0
PALPITATIONS: 1
LIGHT-HEADEDNESS: 1
WEAKNESS: 0
DIAPHORESIS: 1

## 2021-04-15 NOTE — TELEPHONE ENCOUNTER
Spoke to pt who states that when she work this AM she noted the her HR was up from her normal rate of 60's to 102 bpm.  Pt is on Flecainide 50 mg for her SVT and states that her HR can not go up higher.  Pt did check her heart rate on her Kardia device. Pt states that she also was diaphoretic and had a dry mouth and states that her pulse was weak.  Pt worried about blockages in her heart.  Pt states that she does not feel right still even after the paramedics were there and offered to transport her to the ER.  Explained that if there is concern for blockages the ER would be able to do the lab work and testing, that may be needed. Pt will go to the ER with family. Destin

## 2021-04-15 NOTE — ED PROVIDER NOTES
History   Chief Complaint:  Palpitations     The history is provided by the patient.      Yaneli Ramey is a 70 year old female with history of paroxysmal SVT on Flecainide, GERD, and asthma who presents with a heart rate elevated up to 102 bpm, while her normal HR is typically in the 60s. The patient states that she had an episode of elevated HR last night with associated brain fog, lightheadedness, diaphoresis, and dry mouth. While her symptoms largely resolved, she had another episode with the same symptoms this morning. This morning her single lead EKG demonstrated a HR up to 102 bpm. Despite this she is having difficulty reading a pulse on her right arm. This does feel similar to her previous episodes of SVT. She denies any associated chest pain, neck fullness, headaches, or numbness and weakness in her upper extremities. She denies shortness of breath, but states she feels as if she is not getting enough oxygen to her head. Use to be on Metoprolol for her SVT, however this caused fatigue and she was switched to Flecainide in 2017. About 8-9 months ago, her dose of Flecainide was reduced to 50 mg BID, and this has largely kept her at a normal rate.  She reached out to her cardiologist this morning regarding her symptoms, and they encouraged her to be seen here.     Review of Systems   Constitutional: Positive for diaphoresis.   Cardiovascular: Positive for palpitations. Negative for chest pain.   Musculoskeletal: Negative for neck pain.   Neurological: Positive for light-headedness. Negative for weakness, numbness and headaches.   All other systems reviewed and are negative.      Allergies:  Adhesive Tape  Cromolyn  Estradiol  Percocet [Oxycodone-Acetaminophen]  Septra [Sulfamethoxazole W-Trimethoprim]    Medications:  Flecainide  Glucosamine     Past Medical History:    Asthma  GERD  Paroxysmal SVT  Pneumonia    Past Surgical History:    Bunionectomy   Discectomy lumbar minimally  invasive  Hysterectomy  Repair hammer toe  Repair ligament ulnar collateral  Repair ptosis bilateral      Family History:    Father - emphysema, CAD, prostate cancer  Mother - Lupus     Social History:  Unaccompanied to the ED.    Physical Exam     Patient Vitals for the past 24 hrs:   BP Temp Temp src Pulse Resp SpO2   04/15/21 1230 134/78 -- -- 75 16 98 %   04/15/21 1200 137/87 -- -- 68 -- 98 %   04/15/21 1130 (!) 145/86 -- -- 71 -- 98 %   04/15/21 1100 (!) 146/76 -- -- 70 21 99 %   04/15/21 1000 (!) 145/82 -- -- 74 13 98 %   04/15/21 0930 (!) 151/93 -- -- 77 22 98 %   04/15/21 0906 (!) 164/103 96.9  F (36.1  C) Temporal 85 18 98 %       Physical Exam  General: Alert, no acute distress; well appearing  Neuro:  PERRL.  EOMI.  Gait stable, no focal deficits; CN II-XII grossly intact.  Negative pronator drift. Finger to nose symmetric and grossly normal bilaterally. 5/5  strength and elbow flexion/extension.  5/5 SLR to bilateral lower extremities.  Sensation intact to light touch in the upper and lower extremities.  HEENT:  Moist mucous membranes. Conjunctiva normal.   CV:  RRR, no m/r/g, skin warm and well perfused  Pulm:  CTAB, no wheezes/ronchi/rales.  No acute distress, breathing comfortably  GI:  Soft, nontender, nondistended.  No rebound or guarding.  Normal bowel sounds  MSK:  Moving all extremities.  No focal areas of edema, erythema, or tenderness  Skin:  WWP, no rashes, no lower extremity edema, skin color normal, no diaphoresis  Psych:  Well-appearing, normal affect, regular speech    Emergency Department Course   ECG  ECG taken at 0915, ECG read at 0917  Normal sinus rhythm  Normal ECG   No significant changes as compared to prior, dated 08/12/0202.  Rate 78 bpm. SD interval 160 ms. QRS duration 108 ms. QT/QTc 386/440 ms. P-R-T axes 38 35 40.     Laboratory:  CBC: WBC 6.6, HGB 15.3,    BMP: potassium 3.2 (L), glucose 118 (H) o/w WNL (Creatinine 0.90)  Troponin (Collected 0921):  <0.015  Troponin (Collected 1133): <0.015  TSH with free T4 reflex: 1.97    UA with Microscopic: mucous present o/w WNL    Emergency Department Course:    Reviewed:  0908 I reviewed nursing notes, vitals, past medical history and care everywhere    Assessments:  0912 HALEY Olson student, obtained history and examined the patient.   0942 I obtained history and examined the patient as noted above.   1122 I rechecked the patient and explained findings.   1259 I rechecked the patient.     Interventions:  1012  mL IV Bolus  1214 Hydroxyzine 25 mg PO     Disposition:  The patient was discharged to home.       Impression & Plan     Medical Decision Making:  Yaneli Ramey is a 70 year old female with history significant for paroxysmal SVT on flecainide who presents to the ER for evaluation of lightheadedness and episode of tachycardia up to 102 which patient feels is abnormal for her associated with palpitations.  On arrival, patient is afebrile, hemodynamically stable with normal heart rate.  Broad differentials considered including run of PSVT, ACS, severe electrolyte disturbance, UTI amongst other things.  Neurologic exam is nonfocal, doubt stroke/TIA.  Fortunately, EKG shows no signs of acute ischemia or infarct and intervals are within normal limits.  No evidence of delta wave, prolonged QTC, Brugada, HOCM.  Apart from mild hypokalemia, the rest of the patient's laboratory studies were unremarkable including troponin x2.  UA is otherwise normal.  Overall unclear cause for patient's lightheadedness and palpitations but at this time I do not find any life-threatening or sinister etiology.  Doubt acute aortic dissection or PE based on history and exam.  With reasonable clinical certainty given reassuring work-up and no recurrence of symptoms, I feel she is safe to discharge home. She did have some improvement with hydroxyzine but patient denies feeling anxious prior and thus we will hold off on starting this  medication and she was comfortable with this.  We will send her home with Zio patch with instructions to follow-up with PCP and cardiology regarding today's visit and she was understanding and comfortable with this plan.   Reasons to return to the ER were discussed and all questions were answered prior to discharge    Diagnosis:    ICD-10-CM    1. Lightheadedness  R42    2. Palpitations  R00.2        Discharge Medications:  Discharge Medication List as of 4/15/2021  1:16 PM          Scribe Disclosure:  I, Annalee Sykes, am serving as a scribe at 1:00 PM on 4/15/2021 to document services personally performed by Virgilio Sherwood MD based on my observations and the provider's statements to me.   =       Virgilio Sherwood MD  04/15/21 1840       Virgilio Sherwood MD  04/15/21 1841

## 2021-04-15 NOTE — ED TRIAGE NOTES
Pt arrives with c/o dizziness and palpitations. Pt has hx of PSVT and reports she occasionally gets symptomatic when her HR elevates. Patient reports she had an episode yesterday and then again today. ABCs intact.

## 2021-04-15 NOTE — TELEPHONE ENCOUNTER
Spoke to pt who was home from the ER. States that the only fining was long potassium and was supplemented. Pt states that she still does not feel good, but better.  Pt has [email or web address] device and will send a couple of strips from today and yesterday for review. Anne

## 2021-04-16 NOTE — TELEPHONE ENCOUNTER
4/16/21 Pt was seen in ED yesterday d/t palpitations, lightheadedness and tachycardia. No tx was recommended but had 7 day leadless monitor placed and has follow up arranged w Dr Martin on 5/9. Will have Dr Martin review Kardia strips pt sent yesterday.  Hyacinth 215 pm

## 2021-04-17 ENCOUNTER — HOSPITAL ENCOUNTER (EMERGENCY)
Facility: CLINIC | Age: 71
Discharge: HOME | End: 2021-04-17
Attending: EMERGENCY MEDICINE | Admitting: EMERGENCY MEDICINE
Payer: COMMERCIAL

## 2021-04-17 VITALS
OXYGEN SATURATION: 100 % | SYSTOLIC BLOOD PRESSURE: 126 MMHG | DIASTOLIC BLOOD PRESSURE: 77 MMHG | RESPIRATION RATE: 18 BRPM | TEMPERATURE: 98.1 F | HEART RATE: 67 BPM

## 2021-04-17 DIAGNOSIS — R42 LIGHTHEADEDNESS: ICD-10-CM

## 2021-04-17 DIAGNOSIS — R42 DIZZINESS: ICD-10-CM

## 2021-04-17 LAB
ANION GAP SERPL CALCULATED.3IONS-SCNC: 2 MMOL/L (ref 3–14)
BASOPHILS # BLD AUTO: 0 10E9/L (ref 0–0.2)
BASOPHILS NFR BLD AUTO: 0.3 %
BUN SERPL-MCNC: 14 MG/DL (ref 7–30)
CALCIUM SERPL-MCNC: 9 MG/DL (ref 8.5–10.1)
CHLORIDE SERPL-SCNC: 110 MMOL/L (ref 94–109)
CO2 SERPL-SCNC: 31 MMOL/L (ref 20–32)
CREAT SERPL-MCNC: 0.79 MG/DL (ref 0.52–1.04)
DIFFERENTIAL METHOD BLD: NORMAL
EOSINOPHIL # BLD AUTO: 0 10E9/L (ref 0–0.7)
EOSINOPHIL NFR BLD AUTO: 0.7 %
ERYTHROCYTE [DISTWIDTH] IN BLOOD BY AUTOMATED COUNT: 13.3 % (ref 10–15)
GFR SERPL CREATININE-BSD FRML MDRD: 75 ML/MIN/{1.73_M2}
GLUCOSE SERPL-MCNC: 94 MG/DL (ref 70–99)
HCT VFR BLD AUTO: 43.3 % (ref 35–47)
HGB BLD-MCNC: 14.1 G/DL (ref 11.7–15.7)
IMM GRANULOCYTES # BLD: 0 10E9/L (ref 0–0.4)
IMM GRANULOCYTES NFR BLD: 0.2 %
LYMPHOCYTES # BLD AUTO: 1.6 10E9/L (ref 0.8–5.3)
LYMPHOCYTES NFR BLD AUTO: 28 %
MCH RBC QN AUTO: 30.6 PG (ref 26.5–33)
MCHC RBC AUTO-ENTMCNC: 32.6 G/DL (ref 31.5–36.5)
MCV RBC AUTO: 94 FL (ref 78–100)
MONOCYTES # BLD AUTO: 0.4 10E9/L (ref 0–1.3)
MONOCYTES NFR BLD AUTO: 6.3 %
NEUTROPHILS # BLD AUTO: 3.8 10E9/L (ref 1.6–8.3)
NEUTROPHILS NFR BLD AUTO: 64.5 %
NRBC # BLD AUTO: 0 10*3/UL
NRBC BLD AUTO-RTO: 0 /100
PLATELET # BLD AUTO: 226 10E9/L (ref 150–450)
POTASSIUM SERPL-SCNC: 4.4 MMOL/L (ref 3.4–5.3)
RBC # BLD AUTO: 4.61 10E12/L (ref 3.8–5.2)
SODIUM SERPL-SCNC: 143 MMOL/L (ref 133–144)
TROPONIN I SERPL-MCNC: <0.015 UG/L (ref 0–0.04)
WBC # BLD AUTO: 5.9 10E9/L (ref 4–11)

## 2021-04-17 PROCEDURE — 93005 ELECTROCARDIOGRAM TRACING: CPT

## 2021-04-17 PROCEDURE — 99285 EMERGENCY DEPT VISIT HI MDM: CPT

## 2021-04-17 PROCEDURE — 85025 COMPLETE CBC W/AUTO DIFF WBC: CPT | Performed by: EMERGENCY MEDICINE

## 2021-04-17 PROCEDURE — 84484 ASSAY OF TROPONIN QUANT: CPT | Performed by: EMERGENCY MEDICINE

## 2021-04-17 PROCEDURE — 80048 BASIC METABOLIC PNL TOTAL CA: CPT | Performed by: EMERGENCY MEDICINE

## 2021-04-17 PROCEDURE — 36415 COLL VENOUS BLD VENIPUNCTURE: CPT | Performed by: EMERGENCY MEDICINE

## 2021-04-17 ASSESSMENT — ENCOUNTER SYMPTOMS
PALPITATIONS: 1
DIAPHORESIS: 1
LIGHT-HEADEDNESS: 1
DIZZINESS: 1

## 2021-04-17 NOTE — ED PROVIDER NOTES
History   Chief Complaint:  Dizziness     The history is provided by the patient.      Yaneli Ramey is a 70 year old female with history of SVT who presents for evaluation of dizziness. The patient was seen here in [facility] ER 2 days ago for concerns of lightheadedness. She came in at that time reporting a episode of elevated heart rate with associated brain fog, lightheadedness, diaphoresis, and dry mouth the night before. She stated at that time that it felt previous to her previous episodes of SVT. She had the below work up done and was discharged with the diagnoses of lightheadedness and palpitations. She had zio patch placed. Yesterday she saw her PCP and was prescribed oral potassium and bentyl.     She comes in today because this morning she had a very similar episodes of dizziness, lightheadedness, and diaphoresis. She called a nurse line and was encouraged to be seen for this. She states that her main concern is a electrolyte imbalance. She notes that she is very active and walks around 2 miles most days and that she can get some of these sensations after these walks but they usually resolve fairly quick. She felt great yesterday and was feeling fine last night. She denies any other known concerns or symptoms at this time.     Workup from 04/15/21:  ECG  ECG taken at 0915, ECG read at 0917  Normal sinus rhythm  Normal ECG   No significant changes as compared to prior, dated 08/12/2020.   Rate 78 bpm. WA interval 160 ms. QRS duration 108 ms. QT/QTc 386/440 ms. P-R-T axes 38 35 40.      Laboratory:  CBC: WBC 6.6, HGB 15.3,    BMP: potassium 3.2 (L), glucose 118 (H) o/w WNL (Creatinine 0.90)  Troponin (Collected 0921): <0.015  Troponin (Collected 1133): <0.015  TSH with free T4 reflex: 1.97     UA with Microscopic: mucous present o/w WNL    Review of Systems   Constitutional: Positive for diaphoresis.   Cardiovascular: Positive for palpitations.   Neurological: Positive for dizziness and  light-headedness.   All other systems reviewed and are negative.      Allergies:  Adhesive Tape  Cromolyn  Estradiol  Percocet [Oxycodone-Acetaminophen]  Septra [Sulfamethoxazole W-Trimethoprim]    Medications:  Flecainide  Glucosamine   Bentyl  Klor-Con M    Past Medical History:    Asthma  GERD  Paroxysmal SVT  Pneumonia  Nuclear sclerosis bilateral  Paroxsymal atrial fibrillation  Osteoarthritis of knee  Insomnia  Seasonal affective disorder  Congenital pes planus     Past Surgical History:    Bunionectomy   Discectomy lumbar minimally invasive  Hysterectomy  Repair hammer toe  Repair ligament ulnar collateral  Repair ptosis bilateral       Family History:    Emphysema  CAD  Prostate cancer  Lupus   COPD  Colon Cancer  Hypertension  Hyperlipidemia  Type II diabetes    Social History:  The patient presents to the ED alone.    Physical Exam     Patient Vitals for the past 24 hrs:   BP Temp Pulse Resp SpO2   04/17/21 1200 126/77 -- 67 -- 100 %   04/17/21 1145 130/84 -- 68 -- 99 %   04/17/21 1130 123/77 -- 66 -- 99 %   04/17/21 1115 136/73 -- 65 -- 99 %   04/17/21 1030 138/75 -- 67 -- 98 %   04/17/21 1015 (!) 149/84 -- 65 -- 98 %   04/17/21 0955 -- -- -- -- 99 %   04/17/21 0953 (!) 118/90 -- 70 -- 99 %   04/17/21 0904 (!) 131/96 98.1  F (36.7  C) 87 18 99 %       Physical Exam  Nursing note and vitals reviewed.  Constitutional: Cooperative.   HENT:   Mouth/Throat: Moist mucous membranes.   Eyes: EOMI, nonicteric sclera  Cardiovascular: Normal rate, regular rhythm, no murmurs, rubs, or gallops  Pulmonary/Chest: Effort normal and breath sounds normal. No respiratory distress. No wheezes. No rales.   Abdominal: Soft. Nontender, nondistended, no guarding or rigidity.   Musculoskeletal: Normal range of motion.   Neurological: Alert. Moves all extremities spontaneously.   Skin: Skin is warm and dry. No rash noted.   Psychiatric: Normal mood and affect.       Emergency Department Course   ECG:  Indication:  Palpitations  Completed at 0923.  Read at 0928.   Normal sinus rhythm. Normal ECG.   Rate 70 bpm. AR interval 184. QRS duration 104. QT/QTc 418/451. P-R-T axes 56 56 55.    Laboratory:  CBC: WBC 5.9, HGB 14.1,     BMP: Cl 110 (H), Anion Gap 2 (L), o/w WNL (Creat 0.79)    Troponin: <0.015     Emergency Department Course:     Reviewed:  I reviewed nursing notes, vitals, past medical history and care everywhere    Assessments:   EKG obtained in the ED, see results above.     1000 I performed a physical exam of the patient. Findings as above.      IV was inserted and blood was drawn for laboratory testing, results above.    1216 Patient rechecked and updated. Plan of care discussed and questions answered.     Disposition:  The patient was discharged to home.     Impression & Plan   Medical Decision Making:  Yaneli Ramey is a 70 year old female who presents with episodes of dizziness as well as sweaty hands and feeling hot/cold.  She has had several similar episodes in the prior days.  She was evaluated in this emergency department several days ago.  No evidence of emergent etiology.  Vital signs are normal.  EKG shows no arrhythmia.  Labs show normal potassium.  Patient reassured that there does not appear to be any acute etiology ongoing.  Uncertain as to the exact etiology of her symptoms.  Many features sound vagal in nature, though uncertain what the trigger is.  She already has a Zio patch on.  Symptomatology is inconsistent with dissection/PE.  Troponin negative.  I believe patient is safe for ongoing outpatient work-up through primary care/cardiology.  She is safe for discharge home from the emergency department at this time.  All of her questions were answered.  Reasons to return discussed.    Diagnosis:    ICD-10-CM    1. Dizziness  R42    2. Lightheadedness  R42      Scribe Disclosure:  I, Valentín Metcalf, am serving as a scribe at 9:47 AM on 4/17/2021 to document services personally performed by  José Miguel Martin MD based on my observations and the provider's statements to me.              José Miguel Martin MD  04/17/21 3144

## 2021-04-17 NOTE — DISCHARGE INSTRUCTIONS
Your potassium today is 4.4 which is normal. We do not recommend taking any supplementation as that could cause it to become elevated.     No signs of heart arrhythmia or other emergency today. Continue zio patch and follow-up with your primary care physician & cardiologist as scheduled.           Discharge Instructions  Dizziness (Lightheaded)  Today you were seen for dizziness.  Dizziness can be caused by many things and it can be very difficult to determine the cause of dizziness.  At this time, your provider has found no signs that your dizziness is due to a serious or life-threatening condition. However, sometimes there is a serious problem that does not show up right away, and it is important for you to follow up with your regular provider as instructed.  Generally, every Emergency Department visit should have a follow-up clinic visit with either a primary or a specialty clinic/provider. Please follow-up as instructed by your emergency provider today.      Return to the Emergency Department if:    You pass out (fainting or falling out), especially during exercise.    You develop chest pain, chest pressure or difficulty breathing.  Your feel an irregular heartbeat.  You have excessive vaginal bleeding, or blood in your stool or vomit (throw up).  You have a high fever.  Your symptoms get worse or more frequent.    If when you begin to feel dizzy or lightheaded, it is important to sit down or lay down immediately to prevent injury from falling.  If you were given a prescription for medicine here today, be sure to read all of the information (including the package insert) that comes with your prescription.  This will include important information about the medicine, its side effects, and any warnings that you need to know about.  The pharmacist who fills the prescription can provide more information and answer questions you may have about the medicine.  If you have questions or concerns that the pharmacist cannot  address, please call or return to the Emergency Department.   Remember that you can always come back to the Emergency Department if you are not able to see your regular provider in the amount of time listed above, if you get any new symptoms, or if there is anything that worries you.

## 2021-04-17 NOTE — TELEPHONE ENCOUNTER
Both Kardia[employer] strips show normal rhythm...    Please call and reassure.    If the ZP is OK, there would be no reason to see her at the office.     DI

## 2021-04-17 NOTE — ED TRIAGE NOTES
Patient presents to the ED reporting feeling lightheaded and clammy this morning. States was seen Thursday for a similar episode.

## 2021-04-19 LAB — INTERPRETATION ECG - MUSE: NORMAL

## 2021-04-19 NOTE — TELEPHONE ENCOUNTER
4/19/21 Msg recd from Dr Martin  Both Kardia[employer] strips show normal rhythm...     Please call and reassure.     If the ZP is OK, there would be no reason to see her at the office.   Attempted to call pt but no answer and unable to leave VM. Sent reply via Moni Claros 115 pm

## 2021-07-13 ENCOUNTER — TELEPHONE (OUTPATIENT)
Dept: CARDIOLOGY | Facility: CLINIC | Age: 71
End: 2021-07-13

## 2021-07-13 DIAGNOSIS — R00.2 PALPITATIONS: ICD-10-CM

## 2021-07-13 DIAGNOSIS — I47.10 PAROXYSMAL SUPRAVENTRICULAR TACHYCARDIA (H): ICD-10-CM

## 2021-07-13 RX ORDER — FLECAINIDE ACETATE 50 MG/1
50 TABLET ORAL 2 TIMES DAILY
Qty: 180 TABLET | Refills: 3 | Status: SHIPPED | OUTPATIENT
Start: 2021-07-13 | End: 2021-01-01 | Stop reason: ALTCHOICE

## 2021-07-13 NOTE — TELEPHONE ENCOUNTER
"----- Message from Veronica Devine RN sent at 7/13/2021 11:40 AM CDT -----  Regarding: Flecainide refill  Received a refill request from [facility] on [address] for flecainide 50 mg twice daily.  LOV 8/12/20 with Dr. Martin \"A.  May decrease flecainide to 50 mg b.i.d. or 75/50 mg every 12 hours.   B.  We plan to see her in followup in 2 years, sooner if she has problems in the interim.\"  Was not sure if she should have any labs or EKG  Please refill as appropriate  Thank you! Veronica"

## 2021-07-13 NOTE — TELEPHONE ENCOUNTER
Spoke to patient to clarify dosing of flecainide.  She reports taking 50mg po bid. Will refill flecainde.  Recent EKG completed on 4/17 as this will fulfill monitoring for flecainide.  Patient will have follow up appt 8/2022.  Will have Dr Martin review EKG from 4/17.  FELY Jaffe

## 2021-10-24 ENCOUNTER — HEALTH MAINTENANCE LETTER (OUTPATIENT)
Age: 71
End: 2021-10-24

## 2021-12-02 NOTE — TELEPHONE ENCOUNTER
I am sorry to hear this news about Yaneli.  There is no significant issue taking tramadol, go ahead with it.  DI

## 2021-12-02 NOTE — TELEPHONE ENCOUNTER
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  She is having another surgery next week (already had hysterectomy) for exploratory reasons.  She wants to be sure its ok to be on tramadol with her flecaindie.  Did review micromedix and no noted drug interactions.  Will update Dr Martin regarding above.  FELY Jaffe

## 2021-12-02 NOTE — TELEPHONE ENCOUNTER
Spoke to patent who is aware that no drug interactions with flecainde and tramadol.  Ok to take per Dr Martin.  FELY Jaffe

## 2021-12-07 NOTE — TELEPHONE ENCOUNTER
Reviewed with Dr Martin and he is ok with patient being on the cannabis program.  Spoke to Claribel Rasmussen (pharamcist) at [facility].  FELY Jaffe

## 2021-12-07 NOTE — TELEPHONE ENCOUNTER
Received a call from Steffany (pharmacist) at [facility].  She indicated that patient qualifies for the cannabis program.  She needed to review with cardiology team prior to starting patient on this to be assured this was ok.  Will have Dr Martin review.  FELY Jaffe

## 2021-12-08 NOTE — OR NURSING
Pt told me when I called that she had been notified that her surgical time has changed to 1230, her arrival time is 1030

## 2021-12-08 NOTE — PROGRESS NOTES
PTA medications updated by Medication Scribe prior to surgery via phone call with patient (last doses completed by Nurse)     Medication history sources: Patient, Surescripts, H&P and Patient's home med list  In the past week, patient estimated taking medication this percent of the time: Greater than 90%  Adherence assessment: N/A Not Observed    Significant changes made to the medication list:  Patient reports no longer taking the following meds (med scribe removed from PTA med list): Zinc, Glucosamine, Melatonin, Omega 3      Additional medication history information:   None    Medication reconciliation completed by provider prior to medication history? No    Time spent in this activity: 25 minutes    The information provided in this note is only as accurate as the sources available at the time of update(s)    Prior to Admission medications    Medication Sig Last Dose Taking? Auth Provider   Acetaminophen 325 MG CAPS Take 325-650 mg by mouth every 4 hours as needed  at AM Yes Reported, Patient   Carboxymethylcellulose Sodium (REFRESH LIQUIGEL OP)   at PRN Yes Reported, Patient   Flaxseed, Linseed, (FLAX SEEDS) POWD Take 1 Tablespoonful by mouth every morning 11/18/2021 at AM Yes Reported, Patient   flecainide (TAMBOCOR) 50 MG tablet Take 1 tablet (50 mg) by mouth 2 times daily  at AM Yes José Miguel Martin MD   fluticasone (FLONASE) 50 MCG/ACT nasal spray Spray 1 spray into both nostrils daily 11/18/2021 at AM Yes Reported, Patient   ibuprofen (ADVIL/MOTRIN) 200 MG capsule Take 200 mg by mouth every 4 hours as needed for fever 11/18/2021 at PRN Yes Reported, Patient   methylcellulose (CITRUCEL) powder Take 0.5 teaspoonful by mouth every morning 12/7/2021 at AM Yes Reported, Patient   multivitamin, therapeutic (THERA-VIT) TABS tablet Take 1 tablet by mouth three times a week  12/7/2021 at AM Yes Unknown, Entered By History   polyethylene glycol (MIRALAX) 17 g packet Take 1 packet by mouth daily 12/7/2021  at AM Yes Reported, Patient   Probiotic Product (PROBIOTIC DAILY PO) Take 1 capsule by mouth three times a week Takes one capsule Sunday, Tuesday, Thursday 12/7/2021 at AM Yes Unknown, Entered By History   Vitamin D (Cholecalciferol) 50 MCG (2000 UT) CAPS Take 1 capsule by mouth daily  12/7/2021 at AM Yes Reported, Patient     Medication history completed by:    Kishore Nova CPhT  Medication [facility]

## 2021-12-09 NOTE — DISCHARGE INSTRUCTIONS
Today you were given 975 mg of Tylenol at 1140am. The recommended daily maximum dose is 4000 mg.         Same Day Surgery Discharge Instructions for  Sedation and General Anesthesia       It's not unusual to feel dizzy, light-headed or faint for up to 24 hours after surgery or while taking pain medication.  If you have these symptoms: sit for a few minutes before standing and have someone assist you when you get up to walk or use the bathroom.      You should rest and relax for the next 24 hours. We recommend you make arrangements to have an adult stay with you for at least 24 hours after your discharge.  Avoid hazardous and strenuous activity.      DO NOT DRIVE any vehicle or operate mechanical equipment for 24 hours following the end of your surgery.  Even though you may feel normal, your reactions may be affected by the medication you have received.      Do not drink alcoholic beverages for 24 hours following surgery.       Slowly progress to your regular diet as you feel able. It's not unusual to feel nauseated and/or vomit after receiving anesthesia.  If you develop these symptoms, drink clear liquids (apple juice, ginger ale, broth, 7-up, etc. ) until you feel better.  If your nausea and vomiting persists for 24 hours, please notify your surgeon.        All narcotic pain medications, along with inactivity and anesthesia, can cause constipation. Drinking plenty of liquids and increasing fiber intake will help.      For any questions of a medical nature, call your surgeon.      Do not make important decisions for 24 hours.      If you had general anesthesia, you may have a sore throat for a couple of days related to the breathing tube used during surgery.  You may use Cepacol lozenges to help with this discomfort.  If it worsens or if you develop a fever, contact your surgeon.       If you feel your pain is not well managed with the pain medications prescribed by your surgeon, please contact your surgeon's  office to let them know so they can address your concerns.       CoVid 19 Information    We want to give you information regarding Covid. Please consult your primary care provider with any questions you might have.     Patient who have symptoms (cough, fever, or shortness of breath), need to isolate for 7 days from when symptoms started OR 72 hours after fever resolves (without fever reducing medications) AND improvement of respiratory symptoms (whichever is longer).      Isolate yourself at home (in own room/own bathroom if possible)    Do Not allow any visitors    Do Not go to work or school    Do Not go to [employer],  centers, shopping, or other public places.    Do Not shake hands.    Avoid close and intimate contact with others (hugging, kissing).    Follow CDC recommendations for household cleaning of frequently touched services.     After the initial 7 days, continue to isolate yourself from household members as much as possible. To continue decrease the risk of community spread and exposure, you and any members of your household should limit activities in public for 14 days after starting home isolation.     You can reference the following CDC link for helpful home isolation/care tips:  https://www.cdc.gov/coronavirus/2019-ncov/downloads/10Things.pdf    Protect Others:    Cover Your Mouth and Nose with a mask, disposable tissue or wash cloth to avoid spreading germs to others.    Wash your hands and face frequently with soap and water    Call Your Primary Doctor If: Breathing difficulty develops or you become worse.    For more information about COVID19 and options for caring for yourself at home, please visit the CDC website at https://www.cdc.gov/coronavirus/2019-ncov/about/steps-when-sick.html  For more options for care at [facility], please visit our website at [email or web address]    **If you have questions or concerns about your procedure,   call Dr. Martinez  [phone]**

## 2021-12-09 NOTE — BRIEF OP NOTE
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  Complications: None.  Implants: * No implants in log *

## 2021-12-09 NOTE — ANESTHESIA CARE TRANSFER NOTE
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    Anesthesia Type:   General     Note:    Oropharynx: oropharynx clear of all foreign objects  Level of Consciousness: awake  Oxygen Supplementation: face mask  Level of Supplemental Oxygen (L/min / FiO2): 10  Independent Airway: airway patency satisfactory and stable  Dentition: dentition unchanged  Vital Signs Stable: post-procedure vital signs reviewed and stable  Report to RN Given: handoff report given  Patient transferred to: PACU  Comments: Spont. Resps, pt. Responding.  Extubated, sufficient air exchange. Oxygen mask placed with 10 L O2. To PACU VSS, Monitors on. Report to RN  Handoff Report: Identifed the Patient, Identified the Reponsible Provider, Reviewed the pertinent medical history, Discussed the surgical course, Reviewed Intra-OP anesthesia mangement and issues during anesthesia, Set expectations for post-procedure period and Allowed opportunity for questions and acknowledgement of understanding      Vitals:  Vitals Value Taken Time   /81 12/09/21 1424   Temp 36.3  C (97.3  F) 12/09/21 1424   Pulse 90 12/09/21 1427   Resp 10 12/09/21 1427   SpO2 100 % 12/09/21 1427   Vitals shown include unvalidated device data.    Electronically Signed By: KIMBER Mendez CRNA  December 9, 2021  2:28 PM

## 2021-12-09 NOTE — OP NOTE
Procedure Date: 12/09/2021    PREOPERATIVE DIAGNOSIS:  Serous high-grade serous carcinomatosis.    POSTOPERATIVE DIAGNOSIS:  Serous high-grade serous carcinomatosis.    SURGEON:  Cheryl Martinez MD    ASSISTANT:  Bhavana Box PA-C    ANESTHESIA:  General endotracheal anesthesia.    PROCEDURE PERFORMED:  Diagnostic laparoscopy.    INDICATIONS FOR PROCEDURE:  The patient is a 71-year-old female who noted fullness and dyspepsia when doing a prep for a virtual colonography.  She noted left-sided discomfort which she had not noted on prior exam. She was placed on omeprazole for 6 weeks.  GI was consulted on 04/26/2021 and by 11/2021 she developed bowel pain in the periumbilical area as well as excessive gas production.  CT scan of abdomen and pelvis on 11/02/2021 revealed moderately infiltrative nodularity throughout the omentum in the left abdomen concerning for carcinomatosis.  The nodularity abuts the left aspect of the bladder.  There was a small focal area of incomplete distention or thickening of the sigmoid in the deep right pelvis.  CA-125 was 410 units per mL. A CT-guided biopsy was obtained 11/08/2021 and revealed high-grade serous carcinoma.  She was seen in consultation in my office.  She described a 14-pound weight loss. She had a palpable firmness in the left lower quadrant.  I discussed surgical and chemotherapeutic management of a high-grade serous carcinomatosis and sites of origin.  She was brought to the operating room for diagnostic laparoscopy to see if disease can be resected at this point or whether we should proceed with neoadjuvant chemotherapy.    FINDINGS:  The patient was found to have miliary disease on the diaphragms.  She had a large area of the transverse colon that was densely stuck to the anterior abdominal wall and the left lower quadrant in the area of her pain due to carcinomatosis involving the omentum.  There was no clear separation of the omentum from the transverse colon.   I could not visualize the pelvis because of the omental cake that was present.  I elected to complete the procedure and we will be arranging for her to start on neoadjuvant chemotherapy for at least 6 cycles prior to considering surgery.    DESCRIPTION OF PROCEDURE:  The patient was taken to the operating room.  Pneumatic compression stockings were placed on her lower extremities.  In the operating room, she was placed in a supine position on the operating room table.  General endotracheal anesthesia was administered in the usual fashion.  Once intubated, she was repositioned in low lithotomy position using well-padded Bharath stirrups.  Her arms were padded and held at her side with draw sheets over her arms and hands.  She was prepped in the usual fashion.  A timeout was conducted and everyone agreed upon the procedure. Because of the firmness in the left lower quadrant, I elected to place my Veress needle through an incision in the right supraumbilical area.  The area was infiltrated with 0.25% Marcaine and a small nick was made in the skin.  A Veress needle was introduced into the peritoneal cavity.  Opening pressure was 3-4 mmHg.  The abdomen was insufflated with carbon dioxide to create a diffuse pneumoperitoneum.  There was more distention on the right side than the left.  After reaching pressure limits of 15 mmHg, the Veress needle was exchanged for a 5 mm trocar.  The camera was then introduced after being white balanced and the above noted findings were there.      At that point, I elected to abandon further cytoreductive attempts.  The laparoscope was removed.  The pneumoperitoneum was allowed to dissipate and the trocar was removed.  The incision site was closed with 4-0 Monocryl in an interrupted fashion to reapproximate the subcutaneous tissue and 4-0 Monocryl in a subcuticular fashion to reapproximate the skin.  A Band-Aid was placed over her incision.  Her anesthesia was reversed.  She was extubated  and taken to recovery room in stable condition.  Estimated blood loss for the procedure was 0 mL.    Bhavana Box was my primary assist for the case.  She helped me with management of the camera once I had placed the Veress needle and a 5 mm port and she did the closure of the port site after the procedure was completed.      Cheryl Martinez MD        D: 2021   T: 2021   MT: PAKMT    Name:     ZULEMA ROBERT FERMÍN  MRN:      [id number]        Account:        [id number]   :      1950           Procedure Date: 2021     Document: [id number]

## 2021-12-09 NOTE — ANESTHESIA PROCEDURE NOTES
Airway       Patient location during procedure: OR       Procedure Start/Stop Times: 12/9/2021 1:36 PM  Staff -        CRNA: Adela Ovalle APRN CRNA       Performed By: CRNAIndications and Patient Condition       Indications for airway management: beata-procedural       Induction type:intravenous      Final Airway Details       Final airway type: endotracheal airway       Successful airway: ETT - single  Endotracheal Airway Details        ETT size (mm): 7.0       Cuffed: yes       Successful intubation technique: direct laryngoscopy       DL Blade Type: Gaviria 2       Grade View of Cords: 1       Adjucts: stylet       Position: Right       Measured from: lips       Secured at (cm): 22       Bite block used: None    Post intubation assessment        Placement verified by: capnometry, equal breath sounds and chest rise        Number of attempts at approach: 1       Secured with: pink tape       Ease of procedure: easy       Dentition: Intact and Unchanged

## 2021-12-09 NOTE — ANESTHESIA POSTPROCEDURE EVALUATION
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    Anesthesia Type:  General    Note:  Disposition: Admission   Postop Pain Control: Uneventful            Sign Out: Well controlled pain   PONV: No   Neuro/Psych: Uneventful            Sign Out: Acceptable/Baseline neuro status   Airway/Respiratory: Uneventful            Sign Out: Acceptable/Baseline resp. status   CV/Hemodynamics: Uneventful            Sign Out: Acceptable CV status   Other NRE:    DID A NON-ROUTINE EVENT OCCUR? No           Last vitals:  Vitals Value Taken Time   /62 12/09/21 1510   Temp 36.8  C (98.3  F) 12/09/21 1450   Pulse 79 12/09/21 1513   Resp 11 12/09/21 1513   SpO2 96 % 12/09/21 1513   Vitals shown include unvalidated device data.    Electronically Signed By: Alanna Arceo  December 9, 2021  3:14 PM

## 2021-12-09 NOTE — ANESTHESIA PREPROCEDURE EVALUATION
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]    Procedure : Procedure(s):  DIAGNOSTIC LAPAROSCOPY  POSSIBLE EXPLORATORY LAPAROTOMY, POSSIBLE BILATERAL SALPINGO-OOPHORECTOMY  OMENTECTOMY  TUMOR CYTOREDUCTION  BOWEL SURGERY          Past Medical History:   Diagnosis Date     Arthritis      Asthma      Complication of anesthesia      Gastro-oesophageal reflux disease      Neoplasm of fallopian tube      Nuclear sclerosis      Paroxysmal supraventricular tachycardia (H)      Pneumonia      Posterior vitreous detachment      SVT (supraventricular tachycardia) (H)       Past Surgical History:   Procedure Laterality Date     BREAST SURGERY      breast biopsy     BUNIONECTOMY       DISCECTOMY LUMBAR MINIMALLY INVASIVE TWO LEVELS       ENDOSCOPIC SINUS SURGERY       HYSTERECTOMY       REPAIR HAMMER TOE       REPAIR LIGAMENT ULNAR COLLATERAL (ELBOW)       REPAIR PTOSIS BILATERAL  2013    Procedure: REPAIR PTOSIS BILATERAL;  right upper lid ptosis repair and bilateral upper eyelid mechanical ptosis repair;  Surgeon: Santo Choudhury MD;  Location: [facility]      Allergies   Allergen Reactions     Adhesive Tape      Cromolyn      Estradiol      Hydrocodone Other (See Comments)     Percocet [Oxycodone-Acetaminophen]      Septra [Sulfamethoxazole W-Trimethoprim]       Social History     Tobacco Use     Smoking status: Former Smoker     Smokeless tobacco: Never Used     Tobacco comment: quit    Substance Use Topics     Alcohol use: Yes     Alcohol/week: 0.0 standard drinks     Comment: min.      Wt Readings from Last 1 Encounters:   20 71.5 kg (157 lb 11.2 oz)        Anesthesia Evaluation   Pt has had prior anesthetic.     History of anesthetic complications (\"shaking from anesthesia)       ROS/MED HX  ENT/Pulmonary:     (+) allergic rhinitis, tobacco use, " Past use, asthma     Neurologic:       Cardiovascular:     (+) -----dysrhythmias (SVT on flecainide), Irregular Heartbeat/Palpitations,     METS/Exercise Tolerance:     Hematologic:       Musculoskeletal:       GI/Hepatic:     (+) GERD,     Renal/Genitourinary:       Endo:       Psychiatric/Substance Use:       Infectious Disease:       Malignancy:   (+) Malignancy, History of Other.Other CA Fallopian tube CA status post.    Other:            Physical Exam    Airway        Mallampati: I   TM distance: > 3 FB   Neck ROM: full     Respiratory Devices and Support         Dental  no notable dental history         Cardiovascular   cardiovascular exam normal          Pulmonary           breath sounds clear to auscultation           OUTSIDE LABS:  CBC:   Lab Results   Component Value Date    WBC 5.9 04/17/2021    WBC 6.6 04/15/2021    HGB 14.1 04/17/2021    HGB 15.3 04/15/2021    HCT 43.3 04/17/2021    HCT 47.7 (H) 04/15/2021     04/17/2021     04/15/2021     BMP:   Lab Results   Component Value Date     04/17/2021     04/15/2021    POTASSIUM 4.4 04/17/2021    POTASSIUM 3.2 (L) 04/15/2021    CHLORIDE 110 (H) 04/17/2021    CHLORIDE 106 04/15/2021    CO2 31 04/17/2021    CO2 27 04/15/2021    BUN 14 04/17/2021    BUN 14 04/15/2021    CR 0.79 04/17/2021    CR 0.90 04/15/2021    GLC 94 04/17/2021     (H) 04/15/2021     COAGS: No results found for: PTT, INR, FIBR  POC: No results found for: BGM, HCG, HCGS  HEPATIC: No results found for: ALBUMIN, PROTTOTAL, ALT, AST, GGT, ALKPHOS, BILITOTAL, BILIDIRECT, FELA  OTHER:   Lab Results   Component Value Date    A1C 5.9 01/05/2017    HOLLY 9.0 04/17/2021    MAG 2.1 01/05/2017    TSH 1.87 04/15/2021       Anesthesia Plan    ASA Status:  2      Anesthesia Type: General.     - Airway: ETT              Consents    Anesthesia Plan(s) and associated risks, benefits, and realistic alternatives discussed. Questions answered and patient/representative(s) expressed  understanding.    - Discussed:     - Discussed with:  Patient         Postoperative Care       PONV prophylaxis: Ondansetron (or other 5HT-3), Dexamethasone or Solumedrol, Background Propofol Infusion     Comments:                Alanna Arceo

## 2021-12-09 NOTE — OR NURSING
Call/Page placed out to Dr. Martinez and her PA Bhavana Box to request change in discharge medication.

## 2021-12-14 NOTE — TELEPHONE ENCOUNTER
12/14/21 Pt LM and also sent [employer] message w EKG snapshot.  She recently started medical cannabis for serous carcinoma. She noted an episode of tachycardia with a rate 100-102 early this am. She is on Flecainide 50 mg BID and took her dose a bit early this am. On phone message she states her HR has returned to 80 about 1 hr after am dose of Flecainide.  She is wondering if the cannabis could have potentially caused the higher HR and also if Flecainide dose needs to be increased.  Will route to Dr Martin for review and contact pt. She is having port placed later this am  KHerroRN 910 am

## 2021-12-15 NOTE — TELEPHONE ENCOUNTER
Spoke to patient regarding recommendations per Dr Martin:  I have reviewed her [employer] tracings.  She is having sinus rhythm with PACs and runs of atrial tachycardia.    Nothing serious or life-threatening here, please reassure her.  Whether this minor arrhythmia relates at all to the recent initiation of cannabis is unclear.    Increasing flecainide to 75 mg twice daily, as instructed last night by Dr. Toscano, is reasonable.  Medication list updated in epic.  Script to pharmacy    Please make appointment for next available (LYNDSEY or myself).   Follow up appt made    Patient provided verbal understanding regarding above.  FELY Jaffe

## 2021-12-15 NOTE — TELEPHONE ENCOUNTER
Asked patient to come in for EKG to evaluate what rhythm she is in being she has been having elevated HR since starting cannibis.   Patient unable to come in.  Very weepy on phone.  Patient did send over some tracings from [employer] for review.  Also has logged her HR in her my chart.  Patient called on call last evening and he instructed her to increase her flecainide dost to 75mg last night.  This morning patient took her normal dose of 50mg.  Will have Dr Martin review tracings.  FELY Jaffe

## 2021-12-15 NOTE — TELEPHONE ENCOUNTER
I have reviewed her [employer] tracings.  She is having sinus rhythm with PACs and runs of atrial tachycardia.    Nothing serious or life-threatening here, please reassure her.  Whether this minor arrhythmia relates at all to the recent initiation of cannabis is unclear.    Increasing flecainide to 75 mg twice daily, as instructed last night by Dr. Toscano, is reasonable.    Please make appointment for next available (LYNDSEY or myself).    ANDRZEJ

## 2021-12-15 NOTE — TELEPHONE ENCOUNTER
Asked patient to come in for EKG to evaluate what rhythm she is in being she has been having elevated HR since starting cannibis.   Patient unable to come in.  Very weepy on phone.  Patient did send over some tracings from [employer] for review.  Also has logged her HR

## 2021-12-20 NOTE — TELEPHONE ENCOUNTER
12/20/21 EKG reviewed by Dr Bernstein in Dr Martin absence. Verbal order for pt to increased Flecainide to 100 mg BID and follow up with Dr Martin on 12/28 as planned.   Spoke w pt and cousin Elizabeth who voiced understanding and agreement. Requested new rx for 50 mg tabs to be taken 2 tabs BID to [facility] in [place] .  [place]ac[place] 120 pm

## 2021-12-20 NOTE — PROGRESS NOTES
EKG performed per orders, printed and shown to Maddi Cullen RN who will go speak with the patient and discharge.  SABA Bustos

## 2021-12-20 NOTE — TELEPHONE ENCOUNTER
12/20/21 Pt and cousin Elizabeth showed up requesting ambulatory EKG for continued episodes of palpitations and tachycardia. This has been ongoing for the past week or so.  Pt has a recent dx of carcinoma. She had a trial of medical cannabis one week ago and that is when her palpitations began. She had a port placed last week and has recd steroids and first chemo last week as well. She has not taken any more cannabis since first dose last week.   She sent a few zappit messages last week which were reviewed by Dr Martin      I have reviewed her Apple Watch tracings.  She is having sinus rhythm with PACs and runs of atrial tachycardia.     Nothing serious or life-threatening here, please reassure her.  Whether this minor arrhythmia relates at all to the recent initiation of cannabis is unclear.     Increasing flecainide to 75 mg twice daily, as instructed last night by Dr. Toscano, is reasonable.     Flecainide was increased to 75 mg BID on 12/15 . Pt states she continues  episodes of tachycardia up to 120 bpm with activity.     Pt here for ambulatory EKG. Will have Dr Bernstein review in Dr Martin absence. Pt has next follow up visit 12/28 with Dr Martin   Pt voiced understanding and agreement with plan.   Hyacinth 1215 pm

## 2021-12-28 NOTE — LETTER
12/28/2021    [facility]  [id number] [address] [id number]    RE: Yaneli Ramey       Dear Colleague,     I had the pleasure of seeing Yaneli Ramey in the [facility].  HPI and Plan:   See dictation [id number]        Orders Placed This Encounter   Procedures     Follow-Up with Cardiac Advanced Practice Provider     EKG 12-lead complete w/read - Clinics       Orders Placed This Encounter   Medications     diphenhydrAMINE (BENADRYL) 12.5 MG/5ML solution     Sig: Take by mouth 4 times daily as needed for allergies or sleep For Chemo     DISCONTD: EPINEPHrine (ADRENACLICK JR) 0.15 MG/0.15ML injection 2-pack     Sig: Inject 0.15 mg into the muscle as needed for anaphylaxis For chemo     methylPREDNISolone sodium succinate 124.2 mg/kg     Sig: Inject 124.2 mg/kg into the vein continuous For chemo     flecainide (TAMBOCOR) 50 MG tablet     Sig: Take 100 mg ( 2 tabs) twice daily     Dispense:  360 tablet     Refill:  3     Please do not fill till patient calls for refill.       Medications Discontinued During This Encounter   Medication Reason     EPINEPHrine (ADRENACLICK JR) 0.15 MG/0.15ML injection 2-pack Erroneous Entry     flecainide (TAMBOCOR) 50 MG tablet Reorder         Encounter Diagnoses   Name Primary?     Paroxysmal atrial fibrillation (H)      Palpitations      Atrial tachycardia (H) Yes       CURRENT MEDICATIONS:  Current Outpatient Medications   Medication Sig Dispense Refill     Acetaminophen 325 MG CAPS Take 325-650 mg by mouth every 4 hours as needed       Carboxymethylcellulose Sodium (REFRESH LIQUIGEL OP)        diphenhydrAMINE (BENADRYL) 12.5 MG/5ML solution Take by mouth 4 times daily as needed for allergies or sleep For Chemo       flecainide (TAMBOCOR) 50 MG tablet Take 100 mg ( 2 tabs) twice daily 360 tablet 3     fluticasone (FLONASE) 50 MCG/ACT nasal spray Spray 1 spray into both nostrils daily       methylcellulose (CITRUCEL) powder Take  0.5 teaspoonful by mouth every morning       methylPREDNISolone sodium succinate 124.2 mg/kg Inject 124.2 mg/kg into the vein continuous For chemo       multivitamin, therapeutic (THERA-VIT) TABS tablet Take 1 tablet by mouth three times a week        polyethylene glycol (MIRALAX) 17 g packet Take 1 packet by mouth daily       Probiotic Product (PROBIOTIC DAILY PO) Take 1 capsule by mouth three times a week Takes one capsule Sunday, Tuesday, Thursday       SENNA-docusate sodium (SENNA S) 8.6-50 MG tablet Take 1-2 tablets by mouth 2 times daily Take while taking dilaudid 20 tablet 0     Vitamin D (Cholecalciferol) 50 MCG (2000 UT) CAPS Take 1 capsule by mouth daily        Flaxseed, Linseed, (FLAX SEEDS) POWD Take 1 Tablespoonful by mouth every morning (Patient not taking: Reported on 12/28/2021)       ibuprofen (ADVIL/MOTRIN) 200 MG capsule Take 200 mg by mouth every 4 hours as needed for fever (Patient not taking: Reported on 12/28/2021)         ALLERGIES     Allergies   Allergen Reactions     Adhesive Tape      Cromolyn      Estradiol      Hydrocodone Other (See Comments)     Other Drug Allergy (See Comments)      Percodan       Percocet [Oxycodone-Acetaminophen]      Septra [Sulfamethoxazole W-Trimethoprim]        PAST MEDICAL HISTORY:  Past Medical History:   Diagnosis Date     Arthritis      Asthma      Asthma      Complication of anesthesia      Gastro-oesophageal reflux disease      Hemorrhoid      Insomnia      Nasal polyp      Neoplasm of fallopian tube      Nuclear sclerosis      Paresthesias      Paroxysmal atrial fibrillation (H)      Paroxysmal supraventricular tachycardia (H)      Pneumonia      Posterior vitreous detachment      SVT (supraventricular tachycardia) (H)        PAST SURGICAL HISTORY:  Past Surgical History:   Procedure Laterality Date     BREAST SURGERY      breast biopsy     BUNIONECTOMY       DISCECTOMY LUMBAR MINIMALLY INVASIVE TWO LEVELS       ENDOSCOPIC SINUS SURGERY        HYSTERECTOMY       LAPAROSCOPY DIAGNOSTIC (GYN) N/A 12/9/2021    Procedure: DIAGNOSTIC LAPAROSCOPY;  Surgeon: Cheryl Martinez MD;  Location:  OR     REPAIR HAMMER TOE       REPAIR LIGAMENT ULNAR COLLATERAL (ELBOW)       REPAIR PTOSIS BILATERAL  12/23/2013    Procedure: REPAIR PTOSIS BILATERAL;  right upper lid ptosis repair and bilateral upper eyelid mechanical ptosis repair;  Surgeon: Santo Choudhury MD;  Location: [facility]       FAMILY HISTORY:  Family History   Problem Relation Age of Onset     Emphysema Father      Coronary Artery Disease Early Onset Father      Prostate Cancer Father      Lupus Mother        SOCIAL HISTORY:  Social History     Socioeconomic History     Marital status: Single     Spouse name: Not on file     Number of children: Not on file     Years of education: Not on file     Highest education level: Not on file   Occupational History     Not on file   Tobacco Use     Smoking status: Former Smoker     Smokeless tobacco: Never Used     Tobacco comment: quit 1992   Vaping Use     Vaping Use: Never used   Substance and Sexual Activity     Alcohol use: Yes     Alcohol/week: 0.0 standard drinks     Comment: min.     Drug use: No     Sexual activity: Not on file   Other Topics Concern     Parent/sibling w/ CABG, MI or angioplasty before 65F 55M? Not Asked      Service Not Asked     Blood Transfusions Not Asked     Caffeine Concern Not Asked     Occupational Exposure Not Asked     Hobby Hazards Not Asked     Sleep Concern Not Asked     Stress Concern Not Asked     Weight Concern Not Asked     Special Diet No     Back Care Not Asked     Exercise No     Bike Helmet Not Asked     Seat Belt Not Asked     Self-Exams Not Asked   Social History Narrative     Not on file     Social Determinants of Health     Financial Resource Strain: Not on file   Food Insecurity: Not on file   Transportation Needs: Not on file   Physical Activity: Not on file   Stress: Not on file   Social Connections:  Not on file   Intimate Partner Violence: Not on file   Housing Stability: Not on file       Review of Systems:  Skin:  Negative rash     Eyes:  Positive for glasses PVD lefft eye   ENT:  Negative   constant bloody vcrust in right nostril  Respiratory:  Negative       Cardiovascular:    palpitations;Positive for;chest pain;fatigue very minor palpitations, little twinges in chest area occasionally, fatigue due to chemo  Gastroenterology: Negative excessive gas or bloating;poor appetite OCC  Genitourinary:  Negative urinary frequency    Musculoskeletal:  Positive for back pain;neck pain Still has but resolved somewhat  Neurologic:  Negative numbness or tingling of feet still feels like road rah on left calf. but much reduced  Psychiatric:  Negative      Heme/Lymph/Imm:  Negative allergies    Endocrine:  Negative        Thank you for allowing me to participate in the care of your patient.      Sincerely,     José Miguel Martin MD     [facility]  cc:   No referring provider defined for this encounter.

## 2021-12-28 NOTE — PROGRESS NOTES
HPI and Plan:   See dictation [id number]        Orders Placed This Encounter   Procedures     Follow-Up with Cardiac Advanced Practice Provider     EKG 12-lead complete w/read - Clinics       Orders Placed This Encounter   Medications     diphenhydrAMINE (BENADRYL) 12.5 MG/5ML solution     Sig: Take by mouth 4 times daily as needed for allergies or sleep For Chemo     DISCONTD: EPINEPHrine (ADRENACLICK JR) 0.15 MG/0.15ML injection 2-pack     Sig: Inject 0.15 mg into the muscle as needed for anaphylaxis For chemo     methylPREDNISolone sodium succinate 124.2 mg/kg     Sig: Inject 124.2 mg/kg into the vein continuous For chemo     flecainide (TAMBOCOR) 50 MG tablet     Sig: Take 100 mg ( 2 tabs) twice daily     Dispense:  360 tablet     Refill:  3     Please do not fill till patient calls for refill.       Medications Discontinued During This Encounter   Medication Reason     EPINEPHrine (ADRENACLICK JR) 0.15 MG/0.15ML injection 2-pack Erroneous Entry     flecainide (TAMBOCOR) 50 MG tablet Reorder         Encounter Diagnoses   Name Primary?     Paroxysmal atrial fibrillation (H)      Palpitations      Atrial tachycardia (H) Yes       CURRENT MEDICATIONS:  Current Outpatient Medications   Medication Sig Dispense Refill     Acetaminophen 325 MG CAPS Take 325-650 mg by mouth every 4 hours as needed       Carboxymethylcellulose Sodium (REFRESH LIQUIGEL OP)        diphenhydrAMINE (BENADRYL) 12.5 MG/5ML solution Take by mouth 4 times daily as needed for allergies or sleep For Chemo       flecainide (TAMBOCOR) 50 MG tablet Take 100 mg ( 2 tabs) twice daily 360 tablet 3     fluticasone (FLONASE) 50 MCG/ACT nasal spray Spray 1 spray into both nostrils daily       methylcellulose (CITRUCEL) powder Take 0.5 teaspoonful by mouth every morning       methylPREDNISolone sodium succinate 124.2 mg/kg Inject 124.2 mg/kg into the vein continuous For chemo       multivitamin, therapeutic (THERA-VIT) TABS tablet Take 1 tablet by mouth three  times a week        polyethylene glycol (MIRALAX) 17 g packet Take 1 packet by mouth daily       Probiotic Product (PROBIOTIC DAILY PO) Take 1 capsule by mouth three times a week Takes one capsule Sunday, Tuesday, Thursday       SENNA-docusate sodium (SENNA S) 8.6-50 MG tablet Take 1-2 tablets by mouth 2 times daily Take while taking dilaudid 20 tablet 0     Vitamin D (Cholecalciferol) 50 MCG (2000 UT) CAPS Take 1 capsule by mouth daily        Flaxseed, Linseed, (FLAX SEEDS) POWD Take 1 Tablespoonful by mouth every morning (Patient not taking: Reported on 12/28/2021)       ibuprofen (ADVIL/MOTRIN) 200 MG capsule Take 200 mg by mouth every 4 hours as needed for fever (Patient not taking: Reported on 12/28/2021)         ALLERGIES     Allergies   Allergen Reactions     Adhesive Tape      Cromolyn      Estradiol      Hydrocodone Other (See Comments)     Other Drug Allergy (See Comments)      Percodan       Percocet [Oxycodone-Acetaminophen]      Septra [Sulfamethoxazole W-Trimethoprim]        PAST MEDICAL HISTORY:  Past Medical History:   Diagnosis Date     Arthritis      Asthma      Asthma      Complication of anesthesia      Gastro-oesophageal reflux disease      Hemorrhoid      Insomnia      Nasal polyp      Neoplasm of fallopian tube      Nuclear sclerosis      Paresthesias      Paroxysmal atrial fibrillation (H)      Paroxysmal supraventricular tachycardia (H)      Pneumonia      Posterior vitreous detachment      SVT (supraventricular tachycardia) (H)        PAST SURGICAL HISTORY:  Past Surgical History:   Procedure Laterality Date     BREAST SURGERY      breast biopsy     BUNIONECTOMY       DISCECTOMY LUMBAR MINIMALLY INVASIVE TWO LEVELS       ENDOSCOPIC SINUS SURGERY       HYSTERECTOMY       LAPAROSCOPY DIAGNOSTIC (GYN) N/A 12/9/2021    Procedure: DIAGNOSTIC LAPAROSCOPY;  Surgeon: Cheryl Martinez MD;  Location: [facility] OR     REPAIR HAMMER TOE       REPAIR LIGAMENT ULNAR COLLATERAL (ELBOW)       REPAIR PTOSIS  BILATERAL  12/23/2013    Procedure: REPAIR PTOSIS BILATERAL;  right upper lid ptosis repair and bilateral upper eyelid mechanical ptosis repair;  Surgeon: Santo Choudhury MD;  [facility]       FAMILY HISTORY:  Family History   Problem Relation Age of Onset     Emphysema Father      Coronary Artery Disease Early Onset Father      Prostate Cancer Father      Lupus Mother        SOCIAL HISTORY:  Social History     Socioeconomic History     Marital status: Single     Spouse name: Not on file     Number of children: Not on file     Years of education: Not on file     Highest education level: Not on file   Occupational History     Not on file   Tobacco Use     Smoking status: Former Smoker     Smokeless tobacco: Never Used     Tobacco comment: quit 1992   Vaping Use     Vaping Use: Never used   Substance and Sexual Activity     Alcohol use: Yes     Alcohol/week: 0.0 standard drinks     Comment: min.     Drug use: No     Sexual activity: Not on file   Other Topics Concern     Parent/sibling w/ CABG, MI or angioplasty before 65F 55M? Not Asked      Service Not Asked     Blood Transfusions Not Asked     Caffeine Concern Not Asked     Occupational Exposure Not Asked     Hobby Hazards Not Asked     Sleep Concern Not Asked     Stress Concern Not Asked     Weight Concern Not Asked     Special Diet No     Back Care Not Asked     Exercise No     Bike Helmet Not Asked     Seat Belt Not Asked     Self-Exams Not Asked   Social History Narrative     Not on file     Social Determinants of Health     Financial Resource Strain: Not on file   Food Insecurity: Not on file   Transportation Needs: Not on file   Physical Activity: Not on file   Stress: Not on file   Social Connections: Not on file   Intimate Partner Violence: Not on file   Housing Stability: Not on file       Review of Systems:  Skin:  Negative rash     Eyes:  Positive for glasses PVD lefft eye   ENT:  Negative   constant bloody vcrust in right  nostril  Respiratory:  Negative       Cardiovascular:    palpitations;Positive for;chest pain;fatigue very minor palpitations, little twinges in chest area occasionally, fatigue due to chemo  Gastroenterology: Negative excessive gas or bloating;poor appetite OCC  Genitourinary:  Negative urinary frequency    Musculoskeletal:  Positive for back pain;neck pain Still has but resolved somewhat  Neurologic:  Negative numbness or tingling of feet still feels like road rah on left calf. but much reduced  Psychiatric:  Negative      Heme/Lymph/Imm:  Negative allergies    Endocrine:  Negative

## 2021-12-28 NOTE — PROGRESS NOTES
Service Date: 12/28/2021    It was my pleasure seeing Ms. Yaneli Ramey, a very nice 71-year-old female, in follow-up of symptomatic nonsustained atrial arrhythmias.    Yaneli has the following medical issues:  A.  Symptomatic PACs and PVCs.  B.  Episodes of nonsustained SVT associated with lightheadedness, leading to hospitalization in 2017.  Treated with flecainide since then.  Excellent suppression of the arrhythmia on flecainide until recently; see below.  C.  Dyslipidemia.  D.  Recently diagnosed stage IIIC ovarian cancer.    Unfortunately, Yaneli was diagnosed with ovarian cancer in November.  She had GI symptoms for a while.  An abdominal CT scan showed the cancer.  She has a port and already received her first course of chemotherapy.  She feels quite worn out from this.    Around the time of chemo initiation and treatment with medical cannabis, she started experiencing more frequent palpitation.  She transmitted recordings to us which showed sinus rhythm with PACs and short runs of atrial tachycardia.  Her flecainide was initially increased from 50 mg b.i.d. to 75 mg b.i.d.  Last week it was further increased to 100 mg b.i.d.  After the latest adjustment her arrhythmias have settled down.    As I mentioned already, she feels quite tired at this time.  She has not had chest pain, syncope or near syncope.      PHYSICAL EXAMINATION:  VITAL SIGNS:  Blood pressure 126/80, heart rate 82 and regular, weight 65 kg, height 178 cm.  GENERAL:  She is a pleasant, slender woman, in no distress.  HEENT:  Head normocephalic.  NECK:  Neck is supple.  LUNGS:  Lungs are clear.  CARDIOVASCULAR:  Regular rhythm without ectopics.   ABDOMEN:  Soft, nontender.  EXTREMITIES:  No edema.      DIAGNOSTIC STUDIES:    - Her 12-lead ECG on 12/26/2021 showed sinus rhythm with PACs and a run of atrial tachycardia.  QRS duration was normal.  - RECENT LABS:  Sodium 143, potassium 4.4, creatinine 0.70, hematocrit 43%.      IMPRESSION:  1.   Recent flare of atrial arrhythmias.  Likely related to the stress from chemotherapy and cancer diagnosis.  Fortunately, Robert is doing better on a higher dose of flecainide 100 mg b.i.d.  She used to be on this higher dose in the past, but we later decreased it because of fatigue and chronotropic incompetence.  At this point, her heart rate is in the 80s on flecainide 100 mg twice daily.  For the time being it seems safe to continue as is.      I reassured Robert that she does not have a life-threatening arrhythmia.  I prefer her atrial tachycardia to be well-controlled while going through cancer treatment as historically arrhythmias have created significant anxiety.      RECOMMENDATIONS:  A.  Stay on flecainide 100 mg b.i.d.  A new prescription was sent to the pharmacy.  B.  I requested a follow-up vosit in 6 months.  I have encouraged Robert to call us in the interim should she have new issues.    I appreciate the opportunity to be part of her care.  Total time spent was 30 minutes.      José Miguel Martin MD        D: 2021   T: 2021   MT: al    Name:     ROBERT POOLE  MRN:      [id number]        Account:      [id number]   :      1950           Service Date: 2021       Document: [id number]

## 2022-01-01 ENCOUNTER — TRANSFERRED RECORDS (OUTPATIENT)
Dept: HEALTH INFORMATION MANAGEMENT | Facility: CLINIC | Age: 72
End: 2022-01-01

## 2022-01-01 ENCOUNTER — APPOINTMENT (OUTPATIENT)
Dept: GENERAL RADIOLOGY | Facility: CLINIC | Age: 72
End: 2022-01-01
Attending: EMERGENCY MEDICINE
Payer: COMMERCIAL

## 2022-01-01 ENCOUNTER — APPOINTMENT (OUTPATIENT)
Dept: CT IMAGING | Facility: CLINIC | Age: 72
End: 2022-01-01
Attending: EMERGENCY MEDICINE
Payer: COMMERCIAL

## 2022-01-01 ENCOUNTER — LAB REQUISITION (OUTPATIENT)
Dept: LAB | Facility: CLINIC | Age: 72
End: 2022-01-01
Payer: COMMERCIAL

## 2022-01-01 ENCOUNTER — APPOINTMENT (OUTPATIENT)
Dept: INTERVENTIONAL RADIOLOGY/VASCULAR | Facility: CLINIC | Age: 72
DRG: 374 | End: 2022-01-01
Attending: INTERNAL MEDICINE
Payer: COMMERCIAL

## 2022-01-01 ENCOUNTER — ANESTHESIA EVENT (OUTPATIENT)
Dept: SURGERY | Facility: CLINIC | Age: 72
End: 2022-01-01
Payer: COMMERCIAL

## 2022-01-01 ENCOUNTER — TELEPHONE (OUTPATIENT)
Dept: CARDIOLOGY | Facility: CLINIC | Age: 72
End: 2022-01-01
Payer: COMMERCIAL

## 2022-01-01 ENCOUNTER — LAB (OUTPATIENT)
Dept: LAB | Facility: CLINIC | Age: 72
End: 2022-01-01
Attending: OBSTETRICS & GYNECOLOGY
Payer: COMMERCIAL

## 2022-01-01 ENCOUNTER — APPOINTMENT (OUTPATIENT)
Dept: OCCUPATIONAL THERAPY | Facility: CLINIC | Age: 72
DRG: 374 | End: 2022-01-01
Payer: COMMERCIAL

## 2022-01-01 ENCOUNTER — TRANSFERRED RECORDS (OUTPATIENT)
Dept: HEALTH INFORMATION MANAGEMENT | Facility: CLINIC | Age: 72
End: 2022-01-01
Payer: COMMERCIAL

## 2022-01-01 ENCOUNTER — APPOINTMENT (OUTPATIENT)
Dept: NUCLEAR MEDICINE | Facility: CLINIC | Age: 72
End: 2022-01-01
Attending: INTERNAL MEDICINE
Payer: COMMERCIAL

## 2022-01-01 ENCOUNTER — HEALTH MAINTENANCE LETTER (OUTPATIENT)
Age: 72
End: 2022-01-01

## 2022-01-01 ENCOUNTER — APPOINTMENT (OUTPATIENT)
Dept: CARDIOLOGY | Facility: CLINIC | Age: 72
End: 2022-01-01
Attending: INTERNAL MEDICINE
Payer: COMMERCIAL

## 2022-01-01 ENCOUNTER — APPOINTMENT (OUTPATIENT)
Dept: OCCUPATIONAL THERAPY | Facility: CLINIC | Age: 72
DRG: 374 | End: 2022-01-01
Attending: INTERNAL MEDICINE
Payer: COMMERCIAL

## 2022-01-01 ENCOUNTER — HOSPITAL ENCOUNTER (EMERGENCY)
Facility: CLINIC | Age: 72
Discharge: HOME | End: 2022-09-25
Attending: EMERGENCY MEDICINE | Admitting: EMERGENCY MEDICINE
Payer: COMMERCIAL

## 2022-01-01 ENCOUNTER — THERAPY VISIT (OUTPATIENT)
Dept: PHYSICAL THERAPY | Facility: CLINIC | Age: 72
End: 2022-01-01
Payer: COMMERCIAL

## 2022-01-01 ENCOUNTER — HOSPITAL ENCOUNTER (INPATIENT)
Facility: CLINIC | Age: 72
LOS: 11 days | Discharge: HOSPICE | DRG: 374 | End: 2022-10-19
Attending: EMERGENCY MEDICINE | Admitting: INTERNAL MEDICINE
Payer: COMMERCIAL

## 2022-01-01 ENCOUNTER — HOSPITAL ENCOUNTER (EMERGENCY)
Facility: CLINIC | Age: 72
Discharge: HOME | End: 2022-10-06
Attending: EMERGENCY MEDICINE | Admitting: EMERGENCY MEDICINE
Payer: COMMERCIAL

## 2022-01-01 ENCOUNTER — PRE VISIT (OUTPATIENT)
Dept: ONCOLOGY | Facility: CLINIC | Age: 72
End: 2022-01-01

## 2022-01-01 ENCOUNTER — HOSPITAL ENCOUNTER (OUTPATIENT)
Facility: CLINIC | Age: 72
Setting detail: OBSERVATION
Discharge: HOME | End: 2022-07-19
Attending: EMERGENCY MEDICINE | Admitting: INTERNAL MEDICINE
Payer: COMMERCIAL

## 2022-01-01 ENCOUNTER — APPOINTMENT (OUTPATIENT)
Dept: MRI IMAGING | Facility: CLINIC | Age: 72
End: 2022-01-01
Attending: INTERNAL MEDICINE
Payer: COMMERCIAL

## 2022-01-01 ENCOUNTER — ONCOLOGY VISIT (OUTPATIENT)
Dept: ONCOLOGY | Facility: CLINIC | Age: 72
End: 2022-01-01
Attending: OBSTETRICS & GYNECOLOGY
Payer: COMMERCIAL

## 2022-01-01 ENCOUNTER — APPOINTMENT (OUTPATIENT)
Dept: ULTRASOUND IMAGING | Facility: CLINIC | Age: 72
DRG: 374 | End: 2022-01-01
Attending: INTERNAL MEDICINE
Payer: COMMERCIAL

## 2022-01-01 ENCOUNTER — APPOINTMENT (OUTPATIENT)
Dept: PHYSICAL THERAPY | Facility: CLINIC | Age: 72
DRG: 374 | End: 2022-01-01
Attending: INTERNAL MEDICINE
Payer: COMMERCIAL

## 2022-01-01 ENCOUNTER — ANESTHESIA (OUTPATIENT)
Dept: SURGERY | Facility: CLINIC | Age: 72
End: 2022-01-01
Payer: COMMERCIAL

## 2022-01-01 ENCOUNTER — OFFICE VISIT (OUTPATIENT)
Dept: CARDIOLOGY | Facility: CLINIC | Age: 72
End: 2022-01-01
Payer: COMMERCIAL

## 2022-01-01 ENCOUNTER — DOCUMENTATION ONLY (OUTPATIENT)
Dept: OTHER | Facility: CLINIC | Age: 72
End: 2022-01-01
Payer: COMMERCIAL

## 2022-01-01 ENCOUNTER — PATIENT OUTREACH (OUTPATIENT)
Dept: ONCOLOGY | Facility: CLINIC | Age: 72
End: 2022-01-01

## 2022-01-01 ENCOUNTER — APPOINTMENT (OUTPATIENT)
Dept: CT IMAGING | Facility: CLINIC | Age: 72
DRG: 374 | End: 2022-01-01
Attending: EMERGENCY MEDICINE
Payer: COMMERCIAL

## 2022-01-01 ENCOUNTER — HOSPITAL ENCOUNTER (OUTPATIENT)
Facility: CLINIC | Age: 72
Setting detail: OBSERVATION
Discharge: HOME | End: 2022-06-10
Attending: OBSTETRICS & GYNECOLOGY | Admitting: OBSTETRICS & GYNECOLOGY
Payer: COMMERCIAL

## 2022-01-01 ENCOUNTER — THERAPY VISIT (OUTPATIENT)
Dept: PHYSICAL THERAPY | Facility: CLINIC | Age: 72
End: 2022-01-01
Attending: NURSE PRACTITIONER
Payer: COMMERCIAL

## 2022-01-01 ENCOUNTER — TRANSCRIBE ORDERS (OUTPATIENT)
Dept: OTHER | Age: 72
End: 2022-01-01

## 2022-01-01 ENCOUNTER — MYC MEDICAL ADVICE (OUTPATIENT)
Dept: CARDIOLOGY | Facility: CLINIC | Age: 72
End: 2022-01-01

## 2022-01-01 VITALS
HEIGHT: 70 IN | OXYGEN SATURATION: 95 % | BODY MASS INDEX: 19.36 KG/M2 | RESPIRATION RATE: 16 BRPM | WEIGHT: 135.2 LBS | TEMPERATURE: 98 F | DIASTOLIC BLOOD PRESSURE: 59 MMHG | HEART RATE: 92 BPM | SYSTOLIC BLOOD PRESSURE: 96 MMHG

## 2022-01-01 VITALS
DIASTOLIC BLOOD PRESSURE: 82 MMHG | HEART RATE: 82 BPM | SYSTOLIC BLOOD PRESSURE: 128 MMHG | HEIGHT: 71 IN | BODY MASS INDEX: 20.27 KG/M2 | RESPIRATION RATE: 18 BRPM | WEIGHT: 144.8 LBS | TEMPERATURE: 98.3 F | OXYGEN SATURATION: 98 %

## 2022-01-01 VITALS
DIASTOLIC BLOOD PRESSURE: 60 MMHG | HEART RATE: 69 BPM | HEIGHT: 70 IN | OXYGEN SATURATION: 99 % | BODY MASS INDEX: 20.63 KG/M2 | SYSTOLIC BLOOD PRESSURE: 108 MMHG

## 2022-01-01 VITALS
RESPIRATION RATE: 18 BRPM | HEART RATE: 78 BPM | WEIGHT: 132 LBS | DIASTOLIC BLOOD PRESSURE: 69 MMHG | OXYGEN SATURATION: 93 % | HEIGHT: 71 IN | TEMPERATURE: 98 F | SYSTOLIC BLOOD PRESSURE: 108 MMHG | BODY MASS INDEX: 18.48 KG/M2

## 2022-01-01 VITALS
RESPIRATION RATE: 16 BRPM | BODY MASS INDEX: 20.2 KG/M2 | HEIGHT: 70 IN | OXYGEN SATURATION: 95 % | DIASTOLIC BLOOD PRESSURE: 62 MMHG | TEMPERATURE: 98.5 F | SYSTOLIC BLOOD PRESSURE: 100 MMHG | HEART RATE: 77 BPM | WEIGHT: 141.1 LBS

## 2022-01-01 VITALS
SYSTOLIC BLOOD PRESSURE: 113 MMHG | WEIGHT: 141.4 LBS | DIASTOLIC BLOOD PRESSURE: 62 MMHG | HEART RATE: 98 BPM | OXYGEN SATURATION: 98 % | BODY MASS INDEX: 20 KG/M2 | TEMPERATURE: 97.5 F | RESPIRATION RATE: 18 BRPM

## 2022-01-01 VITALS
RESPIRATION RATE: 18 BRPM | WEIGHT: 142 LBS | HEIGHT: 71 IN | BODY MASS INDEX: 19.88 KG/M2 | SYSTOLIC BLOOD PRESSURE: 114 MMHG | DIASTOLIC BLOOD PRESSURE: 78 MMHG | HEART RATE: 84 BPM | OXYGEN SATURATION: 98 % | TEMPERATURE: 99 F

## 2022-01-01 DIAGNOSIS — R42 LIGHTHEADEDNESS: ICD-10-CM

## 2022-01-01 DIAGNOSIS — I48.0 PAROXYSMAL ATRIAL FIBRILLATION (H): ICD-10-CM

## 2022-01-01 DIAGNOSIS — C80.0 CARCINOMATOSIS (H): ICD-10-CM

## 2022-01-01 DIAGNOSIS — Z11.59 ENCOUNTER FOR SCREENING FOR OTHER VIRAL DISEASES: ICD-10-CM

## 2022-01-01 DIAGNOSIS — R42 DIZZINESS: ICD-10-CM

## 2022-01-01 DIAGNOSIS — C57.9 GYNECOLOGIC MALIGNANCY (H): ICD-10-CM

## 2022-01-01 DIAGNOSIS — R52 PAIN: ICD-10-CM

## 2022-01-01 DIAGNOSIS — C56.9 OVARIAN CANCER, UNSPECIFIED LATERALITY (H): ICD-10-CM

## 2022-01-01 DIAGNOSIS — R03.0 ELEVATED BLOOD PRESSURE READING: ICD-10-CM

## 2022-01-01 DIAGNOSIS — C57.00: ICD-10-CM

## 2022-01-01 DIAGNOSIS — Z85.43 PERSONAL HISTORY OF OVARIAN CANCER: Primary | ICD-10-CM

## 2022-01-01 DIAGNOSIS — I48.0 PAROXYSMAL ATRIAL FIBRILLATION (H): Primary | ICD-10-CM

## 2022-01-01 DIAGNOSIS — I47.19 ATRIAL TACHYCARDIA (H): Primary | ICD-10-CM

## 2022-01-01 DIAGNOSIS — Z11.59 ENCOUNTER FOR SCREENING FOR OTHER VIRAL DISEASES: Primary | ICD-10-CM

## 2022-01-01 DIAGNOSIS — I25.10 CORONARY ARTERY CALCIFICATION: ICD-10-CM

## 2022-01-01 DIAGNOSIS — R18.8 OTHER ASCITES: ICD-10-CM

## 2022-01-01 DIAGNOSIS — M62.89 PELVIC FLOOR DYSFUNCTION: Primary | ICD-10-CM

## 2022-01-01 DIAGNOSIS — R00.0 TACHYCARDIA: Primary | ICD-10-CM

## 2022-01-01 DIAGNOSIS — C57.00 PRIMARY MALIGNANT NEOPLASM OF FALLOPIAN TUBE (H): ICD-10-CM

## 2022-01-01 DIAGNOSIS — R00.2 PALPITATIONS: ICD-10-CM

## 2022-01-01 DIAGNOSIS — F41.9 ANXIETY: ICD-10-CM

## 2022-01-01 DIAGNOSIS — C57.00 PRIMARY MALIGNANT NEOPLASM OF FALLOPIAN TUBE (H): Primary | ICD-10-CM

## 2022-01-01 DIAGNOSIS — E86.0 DEHYDRATION: ICD-10-CM

## 2022-01-01 DIAGNOSIS — M62.81 GENERALIZED MUSCLE WEAKNESS: ICD-10-CM

## 2022-01-01 DIAGNOSIS — R79.89 ELEVATED TROPONIN: ICD-10-CM

## 2022-01-01 DIAGNOSIS — R14.0 ABDOMINAL DISTENTION: ICD-10-CM

## 2022-01-01 DIAGNOSIS — M62.89 PELVIC FLOOR DYSFUNCTION: ICD-10-CM

## 2022-01-01 DIAGNOSIS — Z85.43 PERSONAL HISTORY OF OVARIAN CANCER: ICD-10-CM

## 2022-01-01 DIAGNOSIS — R11.0 NAUSEA: ICD-10-CM

## 2022-01-01 DIAGNOSIS — R18.0 MALIGNANT ASCITES (H): ICD-10-CM

## 2022-01-01 LAB
ALBUMIN SERPL BCG-MCNC: 3 G/DL (ref 3.5–5.2)
ALBUMIN SERPL-MCNC: 1.8 G/DL (ref 3.4–5)
ALBUMIN SERPL-MCNC: 2.2 G/DL (ref 3.4–5)
ALBUMIN SERPL-MCNC: 2.8 G/DL (ref 3.4–5)
ALBUMIN SERPL-MCNC: 3.2 G/DL (ref 3.4–5)
ALBUMIN SERPL-MCNC: 3.3 G/DL (ref 3.4–5)
ALBUMIN SERPL-MCNC: 3.8 G/DL (ref 3.4–5)
ALBUMIN UR-MCNC: 20 MG/DL
ALBUMIN UR-MCNC: NEGATIVE MG/DL
ALP SERPL-CCNC: 36 U/L (ref 40–150)
ALP SERPL-CCNC: 45 U/L (ref 40–150)
ALP SERPL-CCNC: 52 U/L (ref 40–150)
ALP SERPL-CCNC: 52 U/L (ref 40–150)
ALP SERPL-CCNC: 54 U/L (ref 40–150)
ALP SERPL-CCNC: 56 U/L (ref 35–104)
ALP SERPL-CCNC: 60 U/L (ref 40–150)
ALT SERPL W P-5'-P-CCNC: 15 U/L (ref 0–50)
ALT SERPL W P-5'-P-CCNC: 18 U/L (ref 0–50)
ALT SERPL W P-5'-P-CCNC: 22 U/L (ref 0–50)
ALT SERPL W P-5'-P-CCNC: 25 U/L (ref 0–50)
ALT SERPL W P-5'-P-CCNC: 27 U/L (ref 0–50)
ALT SERPL W P-5'-P-CCNC: 32 U/L (ref 0–50)
ALT SERPL W P-5'-P-CCNC: 32 U/L (ref 10–35)
ANION GAP SERPL CALCULATED.3IONS-SCNC: 10 MMOL/L (ref 3–14)
ANION GAP SERPL CALCULATED.3IONS-SCNC: 12 MMOL/L (ref 7–15)
ANION GAP SERPL CALCULATED.3IONS-SCNC: 6 MMOL/L (ref 3–14)
ANION GAP SERPL CALCULATED.3IONS-SCNC: 7 MMOL/L (ref 3–14)
ANION GAP SERPL CALCULATED.3IONS-SCNC: 8 MMOL/L (ref 3–14)
ANION GAP SERPL CALCULATED.3IONS-SCNC: 9 MMOL/L (ref 3–14)
APPEARANCE UR: CLEAR
APPEARANCE UR: CLEAR
AST SERPL W P-5'-P-CCNC: 14 U/L (ref 0–45)
AST SERPL W P-5'-P-CCNC: 19 U/L (ref 0–45)
AST SERPL W P-5'-P-CCNC: 19 U/L (ref 0–45)
AST SERPL W P-5'-P-CCNC: 20 U/L (ref 0–45)
AST SERPL W P-5'-P-CCNC: 22 U/L (ref 0–45)
AST SERPL W P-5'-P-CCNC: 22 U/L (ref 0–45)
AST SERPL W P-5'-P-CCNC: 41 U/L (ref 10–35)
ATRIAL RATE - MUSE: 80 BPM
ATRIAL RATE - MUSE: 82 BPM
ATRIAL RATE - MUSE: 83 BPM
BACTERIA #/AREA URNS HPF: ABNORMAL /HPF
BASOPHILS # BLD AUTO: 0 10E3/UL (ref 0–0.2)
BASOPHILS # BLD AUTO: 0 10E3/UL (ref 0–0.2)
BASOPHILS # BLD AUTO: 0.1 10E3/UL (ref 0–0.2)
BASOPHILS # BLD AUTO: 0.1 10E3/UL (ref 0–0.2)
BASOPHILS NFR BLD AUTO: 0 %
BASOPHILS NFR BLD AUTO: 0 %
BASOPHILS NFR BLD AUTO: 1 %
BASOPHILS NFR BLD AUTO: 1 %
BILIRUB DIRECT SERPL-MCNC: 0.1 MG/DL (ref 0–0.2)
BILIRUB SERPL-MCNC: 0.2 MG/DL
BILIRUB SERPL-MCNC: 0.3 MG/DL (ref 0.2–1.3)
BILIRUB SERPL-MCNC: 0.4 MG/DL (ref 0.2–1.3)
BILIRUB SERPL-MCNC: 0.4 MG/DL (ref 0.2–1.3)
BILIRUB SERPL-MCNC: 0.5 MG/DL (ref 0.2–1.3)
BILIRUB SERPL-MCNC: 0.7 MG/DL (ref 0.2–1.3)
BILIRUB SERPL-MCNC: 0.7 MG/DL (ref 0.2–1.3)
BILIRUB UR QL STRIP: NEGATIVE
BILIRUB UR QL STRIP: NEGATIVE
BUN SERPL-MCNC: 17 MG/DL (ref 7–30)
BUN SERPL-MCNC: 18 MG/DL (ref 7–30)
BUN SERPL-MCNC: 19 MG/DL (ref 7–30)
BUN SERPL-MCNC: 21 MG/DL (ref 7–30)
BUN SERPL-MCNC: 21 MG/DL (ref 7–30)
BUN SERPL-MCNC: 24.4 MG/DL (ref 8–23)
BURR CELLS BLD QL SMEAR: SLIGHT
CALCIUM SERPL-MCNC: 7.6 MG/DL (ref 8.5–10.1)
CALCIUM SERPL-MCNC: 8.3 MG/DL (ref 8.5–10.1)
CALCIUM SERPL-MCNC: 8.5 MG/DL (ref 8.5–10.1)
CALCIUM SERPL-MCNC: 8.6 MG/DL (ref 8.8–10.2)
CALCIUM SERPL-MCNC: 8.9 MG/DL (ref 8.5–10.1)
CALCIUM SERPL-MCNC: 9.3 MG/DL (ref 8.5–10.1)
CHLORIDE BLD-SCNC: 101 MMOL/L (ref 94–109)
CHLORIDE BLD-SCNC: 105 MMOL/L (ref 94–109)
CHLORIDE BLD-SCNC: 105 MMOL/L (ref 94–109)
CHLORIDE BLD-SCNC: 107 MMOL/L (ref 94–109)
CHLORIDE BLD-SCNC: 109 MMOL/L (ref 94–109)
CHLORIDE SERPL-SCNC: 99 MMOL/L (ref 98–107)
CK SERPL-CCNC: 89 U/L (ref 30–225)
CO2 SERPL-SCNC: 21 MMOL/L (ref 20–32)
CO2 SERPL-SCNC: 22 MMOL/L (ref 20–32)
CO2 SERPL-SCNC: 24 MMOL/L (ref 20–32)
CO2 SERPL-SCNC: 27 MMOL/L (ref 20–32)
CO2 SERPL-SCNC: 28 MMOL/L (ref 20–32)
COLOR UR AUTO: NORMAL
COLOR UR AUTO: YELLOW
CREAT SERPL-MCNC: 0.74 MG/DL (ref 0.52–1.04)
CREAT SERPL-MCNC: 0.74 MG/DL (ref 0.52–1.04)
CREAT SERPL-MCNC: 0.83 MG/DL (ref 0.52–1.04)
CREAT SERPL-MCNC: 0.92 MG/DL (ref 0.52–1.04)
CREAT SERPL-MCNC: 0.97 MG/DL (ref 0.52–1.04)
CREAT SERPL-MCNC: 1.26 MG/DL (ref 0.51–0.95)
DEPRECATED HCO3 PLAS-SCNC: 25 MMOL/L (ref 22–29)
DIASTOLIC BLOOD PRESSURE - MUSE: NORMAL MMHG
EOSINOPHIL # BLD AUTO: 0 10E3/UL (ref 0–0.7)
EOSINOPHIL # BLD AUTO: 0.1 10E3/UL (ref 0–0.7)
EOSINOPHIL NFR BLD AUTO: 1 %
EOSINOPHIL NFR BLD AUTO: 2 %
ERYTHROCYTE [DISTWIDTH] IN BLOOD BY AUTOMATED COUNT: 13.9 % (ref 10–15)
ERYTHROCYTE [DISTWIDTH] IN BLOOD BY AUTOMATED COUNT: 14.8 % (ref 10–15)
ERYTHROCYTE [DISTWIDTH] IN BLOOD BY AUTOMATED COUNT: 15.7 % (ref 10–15)
GFR SERPL CREATININE-BSD FRML MDRD: 45 ML/MIN/1.73M2
GFR SERPL CREATININE-BSD FRML MDRD: 61 ML/MIN/1.73M2
GFR SERPL CREATININE-BSD FRML MDRD: 65 ML/MIN/1.73M2
GFR SERPL CREATININE-BSD FRML MDRD: 74 ML/MIN/1.73M2
GFR SERPL CREATININE-BSD FRML MDRD: 85 ML/MIN/1.73M2
GFR SERPL CREATININE-BSD FRML MDRD: 85 ML/MIN/1.73M2
GLUCOSE BLD-MCNC: 103 MG/DL (ref 70–99)
GLUCOSE BLD-MCNC: 105 MG/DL (ref 70–99)
GLUCOSE BLD-MCNC: 127 MG/DL (ref 70–99)
GLUCOSE BLD-MCNC: 138 MG/DL (ref 70–99)
GLUCOSE BLD-MCNC: 95 MG/DL (ref 70–99)
GLUCOSE BLDC GLUCOMTR-MCNC: 126 MG/DL (ref 70–99)
GLUCOSE SERPL-MCNC: 111 MG/DL (ref 70–99)
GLUCOSE UR STRIP-MCNC: NEGATIVE MG/DL
GLUCOSE UR STRIP-MCNC: NEGATIVE MG/DL
HCO3 BLDV-SCNC: 28 MMOL/L (ref 21–28)
HCT VFR BLD AUTO: 32.1 % (ref 35–47)
HCT VFR BLD AUTO: 34.5 % (ref 35–47)
HCT VFR BLD AUTO: 35.6 % (ref 35–47)
HCT VFR BLD AUTO: 36.8 % (ref 35–47)
HCT VFR BLD AUTO: 37.6 % (ref 35–47)
HCT VFR BLD AUTO: 40.8 % (ref 35–47)
HGB BLD-MCNC: 10 G/DL (ref 11.7–15.7)
HGB BLD-MCNC: 11.3 G/DL (ref 11.7–15.7)
HGB BLD-MCNC: 11.4 G/DL (ref 11.7–15.7)
HGB BLD-MCNC: 11.8 G/DL (ref 11.7–15.7)
HGB BLD-MCNC: 12.4 G/DL (ref 11.7–15.7)
HGB BLD-MCNC: 13.2 G/DL (ref 11.7–15.7)
HGB UR QL STRIP: NEGATIVE
HGB UR QL STRIP: NEGATIVE
HOLD SPECIMEN: NORMAL
IMM GRANULOCYTES # BLD: 0 10E3/UL
IMM GRANULOCYTES # BLD: 0 10E3/UL
IMM GRANULOCYTES # BLD: 0.1 10E3/UL
IMM GRANULOCYTES # BLD: 0.1 10E3/UL
IMM GRANULOCYTES NFR BLD: 0 %
IMM GRANULOCYTES NFR BLD: 0 %
IMM GRANULOCYTES NFR BLD: 1 %
IMM GRANULOCYTES NFR BLD: 1 %
INTERPRETATION ECG - MUSE: NORMAL
KETONES UR STRIP-MCNC: 20 MG/DL
KETONES UR STRIP-MCNC: NEGATIVE MG/DL
LACTATE BLD-SCNC: 0.6 MMOL/L
LACTATE SERPL-SCNC: 1.1 MMOL/L (ref 0.7–2)
LACTATE SERPL-SCNC: 1.8 MMOL/L (ref 0.7–2)
LEUKOCYTE ESTERASE UR QL STRIP: NEGATIVE
LEUKOCYTE ESTERASE UR QL STRIP: NEGATIVE
LIPASE SERPL-CCNC: 123 U/L (ref 73–393)
LIPASE SERPL-CCNC: 169 U/L (ref 73–393)
LVEF ECHO: NORMAL
LYMPHOCYTES # BLD AUTO: 1.6 10E3/UL (ref 0.8–5.3)
LYMPHOCYTES # BLD AUTO: 1.9 10E3/UL (ref 0.8–5.3)
LYMPHOCYTES # BLD AUTO: 2.1 10E3/UL (ref 0.8–5.3)
LYMPHOCYTES # BLD AUTO: 2.4 10E3/UL (ref 0.8–5.3)
LYMPHOCYTES NFR BLD AUTO: 18 %
LYMPHOCYTES NFR BLD AUTO: 20 %
LYMPHOCYTES NFR BLD AUTO: 30 %
LYMPHOCYTES NFR BLD AUTO: 40 %
MAGNESIUM SERPL-MCNC: 2.1 MG/DL (ref 1.6–2.3)
MAGNESIUM SERPL-MCNC: 2.3 MG/DL (ref 1.6–2.3)
MCH RBC QN AUTO: 29.8 PG (ref 26.5–33)
MCH RBC QN AUTO: 29.8 PG (ref 26.5–33)
MCH RBC QN AUTO: 29.9 PG (ref 26.5–33)
MCH RBC QN AUTO: 30.9 PG (ref 26.5–33)
MCH RBC QN AUTO: 31.5 PG (ref 26.5–33)
MCH RBC QN AUTO: 31.7 PG (ref 26.5–33)
MCHC RBC AUTO-ENTMCNC: 31.2 G/DL (ref 31.5–36.5)
MCHC RBC AUTO-ENTMCNC: 32 G/DL (ref 31.5–36.5)
MCHC RBC AUTO-ENTMCNC: 32.1 G/DL (ref 31.5–36.5)
MCHC RBC AUTO-ENTMCNC: 32.4 G/DL (ref 31.5–36.5)
MCHC RBC AUTO-ENTMCNC: 32.8 G/DL (ref 31.5–36.5)
MCHC RBC AUTO-ENTMCNC: 33 G/DL (ref 31.5–36.5)
MCV RBC AUTO: 93 FL (ref 78–100)
MCV RBC AUTO: 93 FL (ref 78–100)
MCV RBC AUTO: 94 FL (ref 78–100)
MCV RBC AUTO: 96 FL (ref 78–100)
MCV RBC AUTO: 96 FL (ref 78–100)
MCV RBC AUTO: 97 FL (ref 78–100)
MONOCYTES # BLD AUTO: 0.4 10E3/UL (ref 0–1.3)
MONOCYTES # BLD AUTO: 0.6 10E3/UL (ref 0–1.3)
MONOCYTES # BLD AUTO: 1.1 10E3/UL (ref 0–1.3)
MONOCYTES # BLD AUTO: 1.2 10E3/UL (ref 0–1.3)
MONOCYTES NFR BLD AUTO: 10 %
MONOCYTES NFR BLD AUTO: 11 %
MONOCYTES NFR BLD AUTO: 12 %
MONOCYTES NFR BLD AUTO: 7 %
MUCOUS THREADS #/AREA URNS LPF: PRESENT /LPF
NEUTROPHILS # BLD AUTO: 3.1 10E3/UL (ref 1.6–8.3)
NEUTROPHILS # BLD AUTO: 3.1 10E3/UL (ref 1.6–8.3)
NEUTROPHILS # BLD AUTO: 6.7 10E3/UL (ref 1.6–8.3)
NEUTROPHILS # BLD AUTO: 7.2 10E3/UL (ref 1.6–8.3)
NEUTROPHILS NFR BLD AUTO: 52 %
NEUTROPHILS NFR BLD AUTO: 57 %
NEUTROPHILS NFR BLD AUTO: 65 %
NEUTROPHILS NFR BLD AUTO: 69 %
NITRATE UR QL: NEGATIVE
NITRATE UR QL: NEGATIVE
NRBC # BLD AUTO: 0 10E3/UL
NRBC BLD AUTO-RTO: 0 /100
NUC STRESS EJECTION FRACTION: 66 %
P AXIS - MUSE: 49 DEGREES
P AXIS - MUSE: 69 DEGREES
P AXIS - MUSE: 69 DEGREES
PATH REPORT.COMMENTS IMP SPEC: ABNORMAL
PATH REPORT.COMMENTS IMP SPEC: NORMAL
PATH REPORT.COMMENTS IMP SPEC: YES
PATH REPORT.COMMENTS IMP SPEC: YES
PATH REPORT.FINAL DX SPEC: ABNORMAL
PATH REPORT.FINAL DX SPEC: ABNORMAL
PATH REPORT.FINAL DX SPEC: NORMAL
PATH REPORT.GROSS SPEC: ABNORMAL
PATH REPORT.GROSS SPEC: ABNORMAL
PATH REPORT.GROSS SPEC: NORMAL
PATH REPORT.MICROSCOPIC SPEC OTHER STN: ABNORMAL
PATH REPORT.MICROSCOPIC SPEC OTHER STN: NORMAL
PATH REPORT.MICROSCOPIC SPEC OTHER STN: NORMAL
PATH REPORT.RELEVANT HX SPEC: ABNORMAL
PATH REPORT.RELEVANT HX SPEC: NORMAL
PATH REPORT.SITE OF ORIGIN SPEC: ABNORMAL
PATHOLOGY SYNOPTIC REPORT: NORMAL
PCO2 BLDV: 44 MM HG (ref 40–50)
PH BLDV: 7.41 [PH] (ref 7.32–7.43)
PH UR STRIP: 6 [PH] (ref 5–7)
PH UR STRIP: 7 [PH] (ref 5–7)
PHOTO IMAGE: NORMAL
PLAT MORPH BLD: ABNORMAL
PLATELET # BLD AUTO: 173 10E3/UL (ref 150–450)
PLATELET # BLD AUTO: 182 10E3/UL (ref 150–450)
PLATELET # BLD AUTO: 195 10E3/UL (ref 150–450)
PLATELET # BLD AUTO: 330 10E3/UL (ref 150–450)
PLATELET # BLD AUTO: 392 10E3/UL (ref 150–450)
PLATELET # BLD AUTO: 458 10E3/UL (ref 150–450)
PO2 BLDV: 30 MM HG (ref 25–47)
POLYCHROMASIA BLD QL SMEAR: SLIGHT
POTASSIUM BLD-SCNC: 3.6 MMOL/L (ref 3.4–5.3)
POTASSIUM BLD-SCNC: 4 MMOL/L (ref 3.4–5.3)
POTASSIUM BLD-SCNC: 4.2 MMOL/L (ref 3.4–5.3)
POTASSIUM BLD-SCNC: 4.2 MMOL/L (ref 3.4–5.3)
POTASSIUM BLD-SCNC: 4.3 MMOL/L (ref 3.4–5.3)
POTASSIUM SERPL-SCNC: 4.6 MMOL/L (ref 3.4–5.3)
PR INTERVAL - MUSE: 182 MS
PR INTERVAL - MUSE: 194 MS
PR INTERVAL - MUSE: 228 MS
PROT SERPL-MCNC: 4 G/DL (ref 6.8–8.8)
PROT SERPL-MCNC: 5.1 G/DL (ref 6.8–8.8)
PROT SERPL-MCNC: 5.4 G/DL (ref 6.4–8.3)
PROT SERPL-MCNC: 5.7 G/DL (ref 6.8–8.8)
PROT SERPL-MCNC: 5.7 G/DL (ref 6.8–8.8)
PROT SERPL-MCNC: 6 G/DL (ref 6.8–8.8)
PROT SERPL-MCNC: 6.7 G/DL (ref 6.8–8.8)
QRS DURATION - MUSE: 106 MS
QRS DURATION - MUSE: 106 MS
QRS DURATION - MUSE: 124 MS
QT - MUSE: 380 MS
QT - MUSE: 400 MS
QT - MUSE: 408 MS
QTC - MUSE: 443 MS
QTC - MUSE: 461 MS
QTC - MUSE: 479 MS
R AXIS - MUSE: 39 DEGREES
R AXIS - MUSE: 39 DEGREES
R AXIS - MUSE: 59 DEGREES
RBC # BLD AUTO: 3.35 10E6/UL (ref 3.8–5.2)
RBC # BLD AUTO: 3.66 10E6/UL (ref 3.8–5.2)
RBC # BLD AUTO: 3.83 10E6/UL (ref 3.8–5.2)
RBC # BLD AUTO: 3.91 10E6/UL (ref 3.8–5.2)
RBC # BLD AUTO: 3.96 10E6/UL (ref 3.8–5.2)
RBC # BLD AUTO: 4.19 10E6/UL (ref 3.8–5.2)
RBC MORPH BLD: ABNORMAL
RBC URINE: 3 /HPF
SAO2 % BLDV: 57 % (ref 94–100)
SARS-COV-2 RNA RESP QL NAA+PROBE: NEGATIVE
SCANNED LAB RESULT: NORMAL
SODIUM SERPL-SCNC: 135 MMOL/L (ref 133–144)
SODIUM SERPL-SCNC: 136 MMOL/L (ref 133–144)
SODIUM SERPL-SCNC: 136 MMOL/L (ref 136–145)
SODIUM SERPL-SCNC: 137 MMOL/L (ref 133–144)
SODIUM SERPL-SCNC: 140 MMOL/L (ref 133–144)
SODIUM SERPL-SCNC: 141 MMOL/L (ref 133–144)
SP GR UR STRIP: 1.01 (ref 1–1.03)
SP GR UR STRIP: 1.01 (ref 1–1.03)
SQUAMOUS EPITHELIAL: 1 /HPF
STRESS ECHO BASELINE DIASTOLIC HE: 81
STRESS ECHO BASELINE HR: 75 BPM
STRESS ECHO BASELINE SYSTOLIC BP: 140
STRESS ECHO TARGET HR: 147
SYSTOLIC BLOOD PRESSURE - MUSE: NORMAL MMHG
T AXIS - MUSE: 43 DEGREES
T AXIS - MUSE: 55 DEGREES
T AXIS - MUSE: 57 DEGREES
TROPONIN I SERPL HS-MCNC: 13 NG/L
TROPONIN I SERPL HS-MCNC: 18 NG/L
TROPONIN I SERPL HS-MCNC: 60 NG/L
TROPONIN I SERPL HS-MCNC: 96 NG/L
UROBILINOGEN UR STRIP-MCNC: NORMAL MG/DL
UROBILINOGEN UR STRIP-MCNC: NORMAL MG/DL
VENTRICULAR RATE- MUSE: 80 BPM
VENTRICULAR RATE- MUSE: 82 BPM
VENTRICULAR RATE- MUSE: 83 BPM
WBC # BLD AUTO: 10.2 10E3/UL (ref 4–11)
WBC # BLD AUTO: 10.4 10E3/UL (ref 4–11)
WBC # BLD AUTO: 5.3 10E3/UL (ref 4–11)
WBC # BLD AUTO: 6 10E3/UL (ref 4–11)
WBC # BLD AUTO: 8.8 10E3/UL (ref 4–11)
WBC # BLD AUTO: 9.4 10E3/UL (ref 4–11)
WBC URINE: 3 /HPF

## 2022-01-01 PROCEDURE — 258N000003 HC RX IP 258 OP 636: Performed by: NURSE ANESTHETIST, CERTIFIED REGISTERED

## 2022-01-01 PROCEDURE — 97530 THERAPEUTIC ACTIVITIES: CPT | Mod: GP | Performed by: PHYSICAL THERAPIST

## 2022-01-01 PROCEDURE — 74177 CT ABD & PELVIS W/CONTRAST: CPT

## 2022-01-01 PROCEDURE — 36415 COLL VENOUS BLD VENIPUNCTURE: CPT | Performed by: INTERNAL MEDICINE

## 2022-01-01 PROCEDURE — 93325 DOPPLER ECHO COLOR FLOW MAPG: CPT

## 2022-01-01 PROCEDURE — 250N000013 HC RX MED GY IP 250 OP 250 PS 637: Performed by: PHYSICIAN ASSISTANT

## 2022-01-01 PROCEDURE — 82550 ASSAY OF CK (CPK): CPT | Performed by: INTERNAL MEDICINE

## 2022-01-01 PROCEDURE — 99203 OFFICE O/P NEW LOW 30 MIN: CPT | Performed by: PHYSICIAN ASSISTANT

## 2022-01-01 PROCEDURE — 99217 PR OBSERVATION CARE DISCHARGE: CPT | Performed by: INTERNAL MEDICINE

## 2022-01-01 PROCEDURE — 0W9G30Z DRAINAGE OF PERITONEAL CAVITY WITH DRAINAGE DEVICE, PERCUTANEOUS APPROACH: ICD-10-PCS | Performed by: RADIOLOGY

## 2022-01-01 PROCEDURE — 88112 CYTOPATH CELL ENHANCE TECH: CPT | Mod: 26

## 2022-01-01 PROCEDURE — 250N000011 HC RX IP 250 OP 636: Performed by: EMERGENCY MEDICINE

## 2022-01-01 PROCEDURE — 85025 COMPLETE CBC W/AUTO DIFF WBC: CPT | Performed by: EMERGENCY MEDICINE

## 2022-01-01 PROCEDURE — 999N000141 HC STATISTIC PRE-PROCEDURE NURSING ASSESSMENT: Performed by: OBSTETRICS & GYNECOLOGY

## 2022-01-01 PROCEDURE — 250N000013 HC RX MED GY IP 250 OP 250 PS 637: Performed by: INTERNAL MEDICINE

## 2022-01-01 PROCEDURE — 99285 EMERGENCY DEPT VISIT HI MDM: CPT | Mod: 25

## 2022-01-01 PROCEDURE — 250N000011 HC RX IP 250 OP 636: Performed by: INTERNAL MEDICINE

## 2022-01-01 PROCEDURE — 258N000003 HC RX IP 258 OP 636: Performed by: ANESTHESIOLOGY

## 2022-01-01 PROCEDURE — 99207 PR CDG-CODE CATEGORY CHANGED: CPT | Performed by: PHYSICIAN ASSISTANT

## 2022-01-01 PROCEDURE — 250N000009 HC RX 250: Performed by: NURSE ANESTHETIST, CERTIFIED REGISTERED

## 2022-01-01 PROCEDURE — C9803 HOPD COVID-19 SPEC COLLECT: HCPCS

## 2022-01-01 PROCEDURE — 93000 ELECTROCARDIOGRAM COMPLETE: CPT | Performed by: INTERNAL MEDICINE

## 2022-01-01 PROCEDURE — 83605 ASSAY OF LACTIC ACID: CPT

## 2022-01-01 PROCEDURE — 370N000017 HC ANESTHESIA TECHNICAL FEE, PER MIN: Performed by: OBSTETRICS & GYNECOLOGY

## 2022-01-01 PROCEDURE — 83735 ASSAY OF MAGNESIUM: CPT | Performed by: NURSE PRACTITIONER

## 2022-01-01 PROCEDURE — 99232 SBSQ HOSP IP/OBS MODERATE 35: CPT | Performed by: INTERNAL MEDICINE

## 2022-01-01 PROCEDURE — 36415 COLL VENOUS BLD VENIPUNCTURE: CPT | Performed by: EMERGENCY MEDICINE

## 2022-01-01 PROCEDURE — 93018 CV STRESS TEST I&R ONLY: CPT | Performed by: INTERNAL MEDICINE

## 2022-01-01 PROCEDURE — 250N000009 HC RX 250: Performed by: EMERGENCY MEDICINE

## 2022-01-01 PROCEDURE — 250N000011 HC RX IP 250 OP 636: Performed by: OBSTETRICS & GYNECOLOGY

## 2022-01-01 PROCEDURE — 272N000706 US PARACENTESIS WITHOUT ALBUMIN

## 2022-01-01 PROCEDURE — 81001 URINALYSIS AUTO W/SCOPE: CPT | Performed by: EMERGENCY MEDICINE

## 2022-01-01 PROCEDURE — 80053 COMPREHEN METABOLIC PANEL: CPT | Performed by: EMERGENCY MEDICINE

## 2022-01-01 PROCEDURE — 82040 ASSAY OF SERUM ALBUMIN: CPT | Performed by: EMERGENCY MEDICINE

## 2022-01-01 PROCEDURE — 80053 COMPREHEN METABOLIC PANEL: CPT | Performed by: NURSE PRACTITIONER

## 2022-01-01 PROCEDURE — 81003 URINALYSIS AUTO W/O SCOPE: CPT | Performed by: EMERGENCY MEDICINE

## 2022-01-01 PROCEDURE — 258N000003 HC RX IP 258 OP 636: Performed by: PHYSICIAN ASSISTANT

## 2022-01-01 PROCEDURE — 250N000013 HC RX MED GY IP 250 OP 250 PS 637: Performed by: EMERGENCY MEDICINE

## 2022-01-01 PROCEDURE — 96361 HYDRATE IV INFUSION ADD-ON: CPT

## 2022-01-01 PROCEDURE — 99207 PR NO BILLABLE SERVICE THIS VISIT: CPT | Performed by: NURSE PRACTITIONER

## 2022-01-01 PROCEDURE — 120N000001 HC R&B MED SURG/OB

## 2022-01-01 PROCEDURE — 250N000013 HC RX MED GY IP 250 OP 250 PS 637: Performed by: REGISTERED NURSE

## 2022-01-01 PROCEDURE — 250N000011 HC RX IP 250 OP 636: Performed by: ANESTHESIOLOGY

## 2022-01-01 PROCEDURE — 258N000001 HC RX 258: Performed by: OBSTETRICS & GYNECOLOGY

## 2022-01-01 PROCEDURE — 83735 ASSAY OF MAGNESIUM: CPT | Performed by: EMERGENCY MEDICINE

## 2022-01-01 PROCEDURE — 250N000009 HC RX 250: Performed by: NURSE PRACTITIONER

## 2022-01-01 PROCEDURE — 71046 X-RAY EXAM CHEST 2 VIEWS: CPT

## 2022-01-01 PROCEDURE — G0463 HOSPITAL OUTPT CLINIC VISIT: HCPCS

## 2022-01-01 PROCEDURE — U0003 INFECTIOUS AGENT DETECTION BY NUCLEIC ACID (DNA OR RNA); SEVERE ACUTE RESPIRATORY SYNDROME CORONAVIRUS 2 (SARS-COV-2) (CORONAVIRUS DISEASE [COVID-19]), AMPLIFIED PROBE TECHNIQUE, MAKING USE OF HIGH THROUGHPUT TECHNOLOGIES AS DESCRIBED BY CMS-2020-01-R: HCPCS

## 2022-01-01 PROCEDURE — 88305 TISSUE EXAM BY PATHOLOGIST: CPT | Mod: 26

## 2022-01-01 PROCEDURE — 250N000011 HC RX IP 250 OP 636: Performed by: NURSE ANESTHETIST, CERTIFIED REGISTERED

## 2022-01-01 PROCEDURE — 96374 THER/PROPH/DIAG INJ IV PUSH: CPT | Mod: 59

## 2022-01-01 PROCEDURE — 88341 IMHCHEM/IMCYTCHM EA ADD ANTB: CPT | Mod: 26 | Performed by: PATHOLOGY

## 2022-01-01 PROCEDURE — 83690 ASSAY OF LIPASE: CPT | Performed by: EMERGENCY MEDICINE

## 2022-01-01 PROCEDURE — 70496 CT ANGIOGRAPHY HEAD: CPT

## 2022-01-01 PROCEDURE — 82040 ASSAY OF SERUM ALBUMIN: CPT | Performed by: INTERNAL MEDICINE

## 2022-01-01 PROCEDURE — G0378 HOSPITAL OBSERVATION PER HR: HCPCS

## 2022-01-01 PROCEDURE — 999N000147 HC STATISTIC PT IP EVAL DEFER

## 2022-01-01 PROCEDURE — 78452 HT MUSCLE IMAGE SPECT MULT: CPT

## 2022-01-01 PROCEDURE — 999N000040 HC STATISTIC CONSULT NO CHARGE VASC ACCESS

## 2022-01-01 PROCEDURE — 93017 CV STRESS TEST TRACING ONLY: CPT

## 2022-01-01 PROCEDURE — 36415 COLL VENOUS BLD VENIPUNCTURE: CPT | Performed by: NURSE PRACTITIONER

## 2022-01-01 PROCEDURE — 49082 ABD PARACENTESIS: CPT

## 2022-01-01 PROCEDURE — 250N000011 HC RX IP 250 OP 636: Performed by: HOSPITALIST

## 2022-01-01 PROCEDURE — 93016 CV STRESS TEST SUPVJ ONLY: CPT | Performed by: INTERNAL MEDICINE

## 2022-01-01 PROCEDURE — 70450 CT HEAD/BRAIN W/O DYE: CPT

## 2022-01-01 PROCEDURE — 99223 1ST HOSP IP/OBS HIGH 75: CPT | Performed by: REGISTERED NURSE

## 2022-01-01 PROCEDURE — 255N000002 HC RX 255 OP 636: Performed by: INTERNAL MEDICINE

## 2022-01-01 PROCEDURE — 258N000003 HC RX IP 258 OP 636: Performed by: EMERGENCY MEDICINE

## 2022-01-01 PROCEDURE — P9047 ALBUMIN (HUMAN), 25%, 50ML: HCPCS | Performed by: EMERGENCY MEDICINE

## 2022-01-01 PROCEDURE — 83605 ASSAY OF LACTIC ACID: CPT | Performed by: EMERGENCY MEDICINE

## 2022-01-01 PROCEDURE — 93005 ELECTROCARDIOGRAM TRACING: CPT

## 2022-01-01 PROCEDURE — 97530 THERAPEUTIC ACTIVITIES: CPT | Mod: GP

## 2022-01-01 PROCEDURE — 96365 THER/PROPH/DIAG IV INF INIT: CPT

## 2022-01-01 PROCEDURE — 272N000602 HC WOUND GLUE CR1

## 2022-01-01 PROCEDURE — U0005 INFEC AGEN DETEC AMPLI PROBE: HCPCS | Performed by: EMERGENCY MEDICINE

## 2022-01-01 PROCEDURE — 93325 DOPPLER ECHO COLOR FLOW MAPG: CPT | Mod: 26 | Performed by: INTERNAL MEDICINE

## 2022-01-01 PROCEDURE — 88305 TISSUE EXAM BY PATHOLOGIST: CPT | Mod: 26 | Performed by: PATHOLOGY

## 2022-01-01 PROCEDURE — 84484 ASSAY OF TROPONIN QUANT: CPT | Performed by: INTERNAL MEDICINE

## 2022-01-01 PROCEDURE — 88342 IMHCHEM/IMCYTCHM 1ST ANTB: CPT | Mod: 26 | Performed by: PATHOLOGY

## 2022-01-01 PROCEDURE — 88342 IMHCHEM/IMCYTCHM 1ST ANTB: CPT | Mod: 26

## 2022-01-01 PROCEDURE — U0003 INFECTIOUS AGENT DETECTION BY NUCLEIC ACID (DNA OR RNA); SEVERE ACUTE RESPIRATORY SYNDROME CORONAVIRUS 2 (SARS-COV-2) (CORONAVIRUS DISEASE [COVID-19]), AMPLIFIED PROBE TECHNIQUE, MAKING USE OF HIGH THROUGHPUT TECHNOLOGIES AS DESCRIBED BY CMS-2020-01-R: HCPCS | Performed by: INTERNAL MEDICINE

## 2022-01-01 PROCEDURE — P9047 ALBUMIN (HUMAN), 25%, 50ML: HCPCS | Performed by: INTERNAL MEDICINE

## 2022-01-01 PROCEDURE — 0W9G3ZZ DRAINAGE OF PERITONEAL CAVITY, PERCUTANEOUS APPROACH: ICD-10-PCS | Performed by: RADIOLOGY

## 2022-01-01 PROCEDURE — 99152 MOD SED SAME PHYS/QHP 5/>YRS: CPT

## 2022-01-01 PROCEDURE — 99203 OFFICE O/P NEW LOW 30 MIN: CPT | Performed by: OBSTETRICS & GYNECOLOGY

## 2022-01-01 PROCEDURE — 93308 TTE F-UP OR LMTD: CPT | Mod: 26 | Performed by: INTERNAL MEDICINE

## 2022-01-01 PROCEDURE — C1729 CATH, DRAINAGE: HCPCS

## 2022-01-01 PROCEDURE — A9502 TC99M TETROFOSMIN: HCPCS | Performed by: INTERNAL MEDICINE

## 2022-01-01 PROCEDURE — 88305 TISSUE EXAM BY PATHOLOGIST: CPT | Mod: TC | Performed by: OBSTETRICS & GYNECOLOGY

## 2022-01-01 PROCEDURE — 88341 IMHCHEM/IMCYTCHM EA ADD ANTB: CPT | Mod: 26

## 2022-01-01 PROCEDURE — 88344 IMHCHEM/IMCYTCHM EA MLT ANTB: CPT | Mod: 26 | Performed by: PATHOLOGY

## 2022-01-01 PROCEDURE — A9585 GADOBUTROL INJECTION: HCPCS | Performed by: INTERNAL MEDICINE

## 2022-01-01 PROCEDURE — 49418 INSERT TUN IP CATH PERC: CPT

## 2022-01-01 PROCEDURE — 84484 ASSAY OF TROPONIN QUANT: CPT | Performed by: NURSE PRACTITIONER

## 2022-01-01 PROCEDURE — 258N000003 HC RX IP 258 OP 636: Performed by: INTERNAL MEDICINE

## 2022-01-01 PROCEDURE — 97535 SELF CARE MNGMENT TRAINING: CPT | Mod: GP | Performed by: PHYSICAL THERAPIST

## 2022-01-01 PROCEDURE — 99239 HOSP IP/OBS DSCHRG MGMT >30: CPT | Performed by: HOSPITALIST

## 2022-01-01 PROCEDURE — 96360 HYDRATION IV INFUSION INIT: CPT

## 2022-01-01 PROCEDURE — 85027 COMPLETE CBC AUTOMATED: CPT | Performed by: INTERNAL MEDICINE

## 2022-01-01 PROCEDURE — 97535 SELF CARE MNGMENT TRAINING: CPT | Mod: GO

## 2022-01-01 PROCEDURE — 272N000500 HC NEEDLE CR2

## 2022-01-01 PROCEDURE — 97110 THERAPEUTIC EXERCISES: CPT | Mod: GP | Performed by: PHYSICAL THERAPIST

## 2022-01-01 PROCEDURE — 250N000013 HC RX MED GY IP 250 OP 250 PS 637: Performed by: ANESTHESIOLOGY

## 2022-01-01 PROCEDURE — 82803 BLOOD GASES ANY COMBINATION: CPT

## 2022-01-01 PROCEDURE — 88321 CONSLTJ&REPRT SLD PREP ELSWR: CPT | Performed by: PATHOLOGY

## 2022-01-01 PROCEDURE — 99222 1ST HOSP IP/OBS MODERATE 55: CPT | Mod: AI | Performed by: INTERNAL MEDICINE

## 2022-01-01 PROCEDURE — 84484 ASSAY OF TROPONIN QUANT: CPT | Performed by: EMERGENCY MEDICINE

## 2022-01-01 PROCEDURE — 250N000025 HC SEVOFLURANE, PER MIN: Performed by: OBSTETRICS & GYNECOLOGY

## 2022-01-01 PROCEDURE — 80053 COMPREHEN METABOLIC PANEL: CPT | Performed by: INTERNAL MEDICINE

## 2022-01-01 PROCEDURE — 99153 MOD SED SAME PHYS/QHP EA: CPT

## 2022-01-01 PROCEDURE — 99220 PR INITIAL OBSERVATION CARE,LEVEL III: CPT | Performed by: INTERNAL MEDICINE

## 2022-01-01 PROCEDURE — 88307 TISSUE EXAM BY PATHOLOGIST: CPT | Mod: 26 | Performed by: PATHOLOGY

## 2022-01-01 PROCEDURE — 93321 DOPPLER ECHO F-UP/LMTD STD: CPT | Mod: 26 | Performed by: INTERNAL MEDICINE

## 2022-01-01 PROCEDURE — 76705 ECHO EXAM OF ABDOMEN: CPT

## 2022-01-01 PROCEDURE — 78452 HT MUSCLE IMAGE SPECT MULT: CPT | Mod: 26 | Performed by: INTERNAL MEDICINE

## 2022-01-01 PROCEDURE — 710N000009 HC RECOVERY PHASE 1, LEVEL 1, PER MIN: Performed by: OBSTETRICS & GYNECOLOGY

## 2022-01-01 PROCEDURE — 250N000009 HC RX 250: Performed by: OBSTETRICS & GYNECOLOGY

## 2022-01-01 PROCEDURE — 74019 RADEX ABDOMEN 2 VIEWS: CPT

## 2022-01-01 PROCEDURE — 97165 OT EVAL LOW COMPLEX 30 MIN: CPT | Mod: GO

## 2022-01-01 PROCEDURE — 97162 PT EVAL MOD COMPLEX 30 MIN: CPT | Mod: GP | Performed by: PHYSICAL THERAPIST

## 2022-01-01 PROCEDURE — 343N000001 HC RX 343: Performed by: INTERNAL MEDICINE

## 2022-01-01 PROCEDURE — 70498 CT ANGIOGRAPHY NECK: CPT

## 2022-01-01 PROCEDURE — 85027 COMPLETE CBC AUTOMATED: CPT | Performed by: NURSE PRACTITIONER

## 2022-01-01 PROCEDURE — 250N000011 HC RX IP 250 OP 636

## 2022-01-01 PROCEDURE — 99214 OFFICE O/P EST MOD 30 MIN: CPT | Performed by: INTERNAL MEDICINE

## 2022-01-01 PROCEDURE — 250N000011 HC RX IP 250 OP 636: Performed by: NURSE PRACTITIONER

## 2022-01-01 PROCEDURE — 83605 ASSAY OF LACTIC ACID: CPT | Performed by: NURSE PRACTITIONER

## 2022-01-01 PROCEDURE — 360N000080 HC SURGERY LEVEL 7, PER MIN: Performed by: OBSTETRICS & GYNECOLOGY

## 2022-01-01 PROCEDURE — 70553 MRI BRAIN STEM W/O & W/DYE: CPT

## 2022-01-01 PROCEDURE — 250N000009 HC RX 250: Performed by: RADIOLOGY

## 2022-01-01 PROCEDURE — C1765 ADHESION BARRIER: HCPCS | Performed by: OBSTETRICS & GYNECOLOGY

## 2022-01-01 PROCEDURE — 97161 PT EVAL LOW COMPLEX 20 MIN: CPT | Mod: GP

## 2022-01-01 PROCEDURE — 82962 GLUCOSE BLOOD TEST: CPT

## 2022-01-01 PROCEDURE — C1769 GUIDE WIRE: HCPCS

## 2022-01-01 PROCEDURE — 272N000001 HC OR GENERAL SUPPLY STERILE: Performed by: OBSTETRICS & GYNECOLOGY

## 2022-01-01 RX ORDER — ONDANSETRON 4 MG/1
4 TABLET, ORALLY DISINTEGRATING ORAL EVERY 6 HOURS PRN
Status: DISCONTINUED | OUTPATIENT
Start: 2022-01-01 | End: 2022-01-01 | Stop reason: HOSPADM

## 2022-01-01 RX ORDER — LIDOCAINE 40 MG/G
CREAM TOPICAL
Status: DISCONTINUED | OUTPATIENT
Start: 2022-01-01 | End: 2022-01-01 | Stop reason: HOSPADM

## 2022-01-01 RX ORDER — IOPAMIDOL 755 MG/ML
66 INJECTION, SOLUTION INTRAVASCULAR ONCE
Status: COMPLETED | OUTPATIENT
Start: 2022-01-01 | End: 2022-01-01

## 2022-01-01 RX ORDER — MORPHINE SULFATE 10 MG/5ML
2.5-5 SOLUTION ORAL
Status: DISCONTINUED | OUTPATIENT
Start: 2022-01-01 | End: 2022-01-01 | Stop reason: HOSPADM

## 2022-01-01 RX ORDER — FENTANYL CITRATE 50 UG/ML
50 INJECTION, SOLUTION INTRAMUSCULAR; INTRAVENOUS EVERY 5 MIN PRN
Status: DISCONTINUED | OUTPATIENT
Start: 2022-01-01 | End: 2022-01-01 | Stop reason: HOSPADM

## 2022-01-01 RX ORDER — SIMETHICONE 125 MG
125 TABLET,CHEWABLE ORAL DAILY PRN
Status: DISCONTINUED | OUTPATIENT
Start: 2022-01-01 | End: 2022-01-01 | Stop reason: HOSPADM

## 2022-01-01 RX ORDER — FLECAINIDE ACETATE 100 MG/1
100 TABLET ORAL EVERY 12 HOURS SCHEDULED
Status: DISCONTINUED | OUTPATIENT
Start: 2022-01-01 | End: 2022-01-01 | Stop reason: HOSPADM

## 2022-01-01 RX ORDER — FENTANYL CITRATE 50 UG/ML
25-50 INJECTION, SOLUTION INTRAMUSCULAR; INTRAVENOUS EVERY 5 MIN PRN
Status: DISCONTINUED | OUTPATIENT
Start: 2022-01-01 | End: 2022-01-01 | Stop reason: HOSPADM

## 2022-01-01 RX ORDER — ONDANSETRON 2 MG/ML
4 INJECTION INTRAMUSCULAR; INTRAVENOUS EVERY 6 HOURS PRN
Status: DISCONTINUED | OUTPATIENT
Start: 2022-01-01 | End: 2022-01-01 | Stop reason: HOSPADM

## 2022-01-01 RX ORDER — POLYETHYLENE GLYCOL 3350 17 G/17G
17 POWDER, FOR SOLUTION ORAL 2 TIMES DAILY
Status: DISCONTINUED | OUTPATIENT
Start: 2022-01-01 | End: 2022-01-01 | Stop reason: HOSPADM

## 2022-01-01 RX ORDER — HYDROMORPHONE HCL IN WATER/PF 6 MG/30 ML
0.4 PATIENT CONTROLLED ANALGESIA SYRINGE INTRAVENOUS EVERY 5 MIN PRN
Status: DISCONTINUED | OUTPATIENT
Start: 2022-01-01 | End: 2022-01-01 | Stop reason: HOSPADM

## 2022-01-01 RX ORDER — CEFAZOLIN SODIUM 2 G/100ML
2 INJECTION, SOLUTION INTRAVENOUS
Status: COMPLETED | OUTPATIENT
Start: 2022-01-01 | End: 2022-01-01

## 2022-01-01 RX ORDER — LIDOCAINE/PRILOCAINE 2.5 %-2.5%
1 CREAM (GRAM) TOPICAL ONCE
Status: COMPLETED | OUTPATIENT
Start: 2022-01-01 | End: 2022-01-01

## 2022-01-01 RX ORDER — SIMETHICONE 125 MG
125 TABLET,CHEWABLE ORAL DAILY PRN
Status: DISCONTINUED | OUTPATIENT
Start: 2022-01-01 | End: 2022-01-01 | Stop reason: RX

## 2022-01-01 RX ORDER — ACETAMINOPHEN 325 MG/1
650 TABLET ORAL EVERY 6 HOURS PRN
Status: DISCONTINUED | OUTPATIENT
Start: 2022-01-01 | End: 2022-01-01 | Stop reason: HOSPADM

## 2022-01-01 RX ORDER — LABETALOL HYDROCHLORIDE 5 MG/ML
10 INJECTION, SOLUTION INTRAVENOUS
Status: DISCONTINUED | OUTPATIENT
Start: 2022-01-01 | End: 2022-01-01 | Stop reason: HOSPADM

## 2022-01-01 RX ORDER — SIMETHICONE 80 MG
80 TABLET,CHEWABLE ORAL EVERY 6 HOURS PRN
Status: DISCONTINUED | OUTPATIENT
Start: 2022-01-01 | End: 2022-01-01 | Stop reason: HOSPADM

## 2022-01-01 RX ORDER — MULTIVIT-MIN/IRON/FOLIC ACID/K 18-600-40
1 CAPSULE ORAL
Status: DISCONTINUED | OUTPATIENT
Start: 2022-01-01 | End: 2022-01-01

## 2022-01-01 RX ORDER — FEXOFENADINE HCL 180 MG/1
180 TABLET ORAL DAILY PRN
COMMUNITY

## 2022-01-01 RX ORDER — LACTULOSE 10 G/15ML
20 SOLUTION ORAL 2 TIMES DAILY
Status: DISCONTINUED | OUTPATIENT
Start: 2022-01-01 | End: 2022-01-01

## 2022-01-01 RX ORDER — PROPOFOL 10 MG/ML
INJECTION, EMULSION INTRAVENOUS CONTINUOUS PRN
Status: DISCONTINUED | OUTPATIENT
Start: 2022-01-01 | End: 2022-01-01

## 2022-01-01 RX ORDER — CHOLECALCIFEROL (VITAMIN D3) 125 MCG
3000 CAPSULE ORAL DAILY PRN
Status: DISCONTINUED | OUTPATIENT
Start: 2022-01-01 | End: 2022-01-01 | Stop reason: HOSPADM

## 2022-01-01 RX ORDER — TRAMADOL HYDROCHLORIDE 50 MG/1
50 TABLET ORAL EVERY 6 HOURS PRN
COMMUNITY
End: 2022-01-01

## 2022-01-01 RX ORDER — AMOXICILLIN 250 MG
2 CAPSULE ORAL 2 TIMES DAILY PRN
Status: DISCONTINUED | OUTPATIENT
Start: 2022-01-01 | End: 2022-01-01 | Stop reason: HOSPADM

## 2022-01-01 RX ORDER — NALOXONE HYDROCHLORIDE 0.4 MG/ML
0.4 INJECTION, SOLUTION INTRAMUSCULAR; INTRAVENOUS; SUBCUTANEOUS
Status: DISCONTINUED | OUTPATIENT
Start: 2022-01-01 | End: 2022-01-01 | Stop reason: HOSPADM

## 2022-01-01 RX ORDER — GADOBUTROL 604.72 MG/ML
7.5 INJECTION INTRAVENOUS ONCE
Status: COMPLETED | OUTPATIENT
Start: 2022-01-01 | End: 2022-01-01

## 2022-01-01 RX ORDER — HEPARIN SODIUM 5000 [USP'U]/.5ML
5000 INJECTION, SOLUTION INTRAVENOUS; SUBCUTANEOUS EVERY 12 HOURS
Status: DISCONTINUED | OUTPATIENT
Start: 2022-01-01 | End: 2022-01-01

## 2022-01-01 RX ORDER — NALOXONE HYDROCHLORIDE 0.4 MG/ML
0.2 INJECTION, SOLUTION INTRAMUSCULAR; INTRAVENOUS; SUBCUTANEOUS
Status: DISCONTINUED | OUTPATIENT
Start: 2022-01-01 | End: 2022-01-01 | Stop reason: HOSPADM

## 2022-01-01 RX ORDER — MULTIVITAMIN,THERAPEUTIC
1 TABLET ORAL
Status: DISCONTINUED | OUTPATIENT
Start: 2022-01-01 | End: 2022-01-01

## 2022-01-01 RX ORDER — HEPARIN SODIUM,PORCINE 10 UNIT/ML
5-10 VIAL (ML) INTRAVENOUS EVERY 24 HOURS
Status: DISCONTINUED | OUTPATIENT
Start: 2022-01-01 | End: 2022-01-01

## 2022-01-01 RX ORDER — HEPARIN SODIUM (PORCINE) LOCK FLUSH IV SOLN 100 UNIT/ML 100 UNIT/ML
5-10 SOLUTION INTRAVENOUS
Status: DISCONTINUED | OUTPATIENT
Start: 2022-01-01 | End: 2022-01-01

## 2022-01-01 RX ORDER — HYDRALAZINE HYDROCHLORIDE 20 MG/ML
2.5-5 INJECTION INTRAMUSCULAR; INTRAVENOUS EVERY 10 MIN PRN
Status: DISCONTINUED | OUTPATIENT
Start: 2022-01-01 | End: 2022-01-01 | Stop reason: HOSPADM

## 2022-01-01 RX ORDER — VITAMIN B COMPLEX
25 TABLET ORAL DAILY
Status: DISCONTINUED | OUTPATIENT
Start: 2022-01-01 | End: 2022-01-01

## 2022-01-01 RX ORDER — OXYCODONE HYDROCHLORIDE 5 MG/1
5 TABLET ORAL EVERY 4 HOURS PRN
Status: CANCELLED | OUTPATIENT
Start: 2022-01-01

## 2022-01-01 RX ORDER — VITAMIN B COMPLEX
25 TABLET ORAL
Status: DISCONTINUED | OUTPATIENT
Start: 2022-01-01 | End: 2022-01-01

## 2022-01-01 RX ORDER — SODIUM CHLORIDE, SODIUM LACTATE, POTASSIUM CHLORIDE, CALCIUM CHLORIDE 600; 310; 30; 20 MG/100ML; MG/100ML; MG/100ML; MG/100ML
1000 INJECTION, SOLUTION INTRAVENOUS CONTINUOUS
Status: DISCONTINUED | OUTPATIENT
Start: 2022-01-01 | End: 2022-01-01

## 2022-01-01 RX ORDER — IOPAMIDOL 755 MG/ML
65 INJECTION, SOLUTION INTRAVASCULAR ONCE
Status: COMPLETED | OUTPATIENT
Start: 2022-01-01 | End: 2022-01-01

## 2022-01-01 RX ORDER — LIDOCAINE/PRILOCAINE 2.5 %-2.5%
CREAM (GRAM) TOPICAL PRN
COMMUNITY

## 2022-01-01 RX ORDER — PROCHLORPERAZINE 25 MG
12.5 SUPPOSITORY, RECTAL RECTAL EVERY 12 HOURS PRN
Status: DISCONTINUED | OUTPATIENT
Start: 2022-01-01 | End: 2022-01-01 | Stop reason: HOSPADM

## 2022-01-01 RX ORDER — DOCUSATE SODIUM 100 MG/1
100-200 CAPSULE, LIQUID FILLED ORAL DAILY PRN
Status: DISCONTINUED | OUTPATIENT
Start: 2022-01-01 | End: 2022-01-01 | Stop reason: HOSPADM

## 2022-01-01 RX ORDER — ASPIRIN 81 MG/1
81 TABLET ORAL DAILY
Status: DISCONTINUED | OUTPATIENT
Start: 2022-01-01 | End: 2022-01-01 | Stop reason: HOSPADM

## 2022-01-01 RX ORDER — SODIUM CHLORIDE, SODIUM LACTATE, POTASSIUM CHLORIDE, CALCIUM CHLORIDE 600; 310; 30; 20 MG/100ML; MG/100ML; MG/100ML; MG/100ML
INJECTION, SOLUTION INTRAVENOUS CONTINUOUS
Status: DISCONTINUED | OUTPATIENT
Start: 2022-01-01 | End: 2022-01-01 | Stop reason: HOSPADM

## 2022-01-01 RX ORDER — CARBOXYMETHYLCELLULOSE SODIUM 5 MG/ML
1 SOLUTION/ DROPS OPHTHALMIC EVERY EVENING
COMMUNITY

## 2022-01-01 RX ORDER — ONDANSETRON 2 MG/ML
4 INJECTION INTRAMUSCULAR; INTRAVENOUS EVERY 30 MIN PRN
Status: DISCONTINUED | OUTPATIENT
Start: 2022-01-01 | End: 2022-01-01

## 2022-01-01 RX ORDER — LACTULOSE 10 G/15ML
20 SOLUTION ORAL
Status: DISCONTINUED | OUTPATIENT
Start: 2022-01-01 | End: 2022-01-01 | Stop reason: HOSPADM

## 2022-01-01 RX ORDER — REGADENOSON 0.08 MG/ML
0.4 INJECTION, SOLUTION INTRAVENOUS ONCE
Status: COMPLETED | OUTPATIENT
Start: 2022-01-01 | End: 2022-01-01

## 2022-01-01 RX ORDER — CHOLECALCIFEROL (VITAMIN D3) 50 MCG
50 TABLET ORAL DAILY
Status: DISCONTINUED | OUTPATIENT
Start: 2022-01-01 | End: 2022-01-01 | Stop reason: HOSPADM

## 2022-01-01 RX ORDER — LIDOCAINE 40 MG/G
CREAM TOPICAL
Status: DISCONTINUED | OUTPATIENT
Start: 2022-01-01 | End: 2022-01-01

## 2022-01-01 RX ORDER — CARBOXYMETHYLCELLULOSE SODIUM 5 MG/ML
1 SOLUTION/ DROPS OPHTHALMIC EVERY EVENING
Status: DISCONTINUED | OUTPATIENT
Start: 2022-01-01 | End: 2022-01-01 | Stop reason: HOSPADM

## 2022-01-01 RX ORDER — FLECAINIDE ACETATE 100 MG/1
100 TABLET ORAL DAILY
Status: DISCONTINUED | OUTPATIENT
Start: 2022-01-01 | End: 2022-01-01

## 2022-01-01 RX ORDER — FLECAINIDE ACETATE 100 MG/1
100 TABLET ORAL ONCE
Status: COMPLETED | OUTPATIENT
Start: 2022-01-01 | End: 2022-01-01

## 2022-01-01 RX ORDER — FLUTICASONE PROPIONATE 50 MCG
1 SPRAY, SUSPENSION (ML) NASAL DAILY
Status: DISCONTINUED | OUTPATIENT
Start: 2022-01-01 | End: 2022-01-01 | Stop reason: HOSPADM

## 2022-01-01 RX ORDER — ACETAMINOPHEN 325 MG/1
975 TABLET ORAL ONCE
Status: COMPLETED | OUTPATIENT
Start: 2022-01-01 | End: 2022-01-01

## 2022-01-01 RX ORDER — PROCHLORPERAZINE MALEATE 5 MG
5 TABLET ORAL EVERY 6 HOURS PRN
Status: DISCONTINUED | OUTPATIENT
Start: 2022-01-01 | End: 2022-01-01 | Stop reason: HOSPADM

## 2022-01-01 RX ORDER — LOPERAMIDE HCL 2 MG
2 CAPSULE ORAL 4 TIMES DAILY PRN
Status: DISCONTINUED | OUTPATIENT
Start: 2022-01-01 | End: 2022-01-01

## 2022-01-01 RX ORDER — SALIVA STIMULANT COMB. NO.3
2 SPRAY, NON-AEROSOL (ML) MUCOUS MEMBRANE
Status: DISCONTINUED | OUTPATIENT
Start: 2022-01-01 | End: 2022-01-01 | Stop reason: HOSPADM

## 2022-01-01 RX ORDER — FLECAINIDE ACETATE 50 MG/1
50 TABLET ORAL EVERY 12 HOURS SCHEDULED
Status: DISCONTINUED | OUTPATIENT
Start: 2022-01-01 | End: 2022-01-01 | Stop reason: HOSPADM

## 2022-01-01 RX ORDER — CHOLECALCIFEROL (VITAMIN D3) 125 MCG
3000 CAPSULE ORAL DAILY PRN
COMMUNITY

## 2022-01-01 RX ORDER — AMINOPHYLLINE 25 MG/ML
50-100 INJECTION, SOLUTION INTRAVENOUS
Status: DISCONTINUED | OUTPATIENT
Start: 2022-01-01 | End: 2022-01-01 | Stop reason: HOSPADM

## 2022-01-01 RX ORDER — AMOXICILLIN 250 MG
1 CAPSULE ORAL 2 TIMES DAILY PRN
Status: DISCONTINUED | OUTPATIENT
Start: 2022-01-01 | End: 2022-01-01 | Stop reason: HOSPADM

## 2022-01-01 RX ORDER — ONDANSETRON 2 MG/ML
4 INJECTION INTRAMUSCULAR; INTRAVENOUS EVERY 30 MIN PRN
Status: DISCONTINUED | OUTPATIENT
Start: 2022-01-01 | End: 2022-01-01 | Stop reason: HOSPADM

## 2022-01-01 RX ORDER — HEPARIN SODIUM (PORCINE) LOCK FLUSH IV SOLN 100 UNIT/ML 100 UNIT/ML
100 SOLUTION INTRAVENOUS ONCE
Status: COMPLETED | OUTPATIENT
Start: 2022-01-01 | End: 2022-01-01

## 2022-01-01 RX ORDER — FLECAINIDE ACETATE 100 MG/1
100 TABLET ORAL EVERY 12 HOURS SCHEDULED
Status: DISCONTINUED | OUTPATIENT
Start: 2022-01-01 | End: 2022-01-01

## 2022-01-01 RX ORDER — ONDANSETRON 4 MG/1
4 TABLET, ORALLY DISINTEGRATING ORAL EVERY 30 MIN PRN
Status: DISCONTINUED | OUTPATIENT
Start: 2022-01-01 | End: 2022-01-01 | Stop reason: HOSPADM

## 2022-01-01 RX ORDER — SIMETHICONE 125 MG
125 TABLET,CHEWABLE ORAL DAILY PRN
COMMUNITY

## 2022-01-01 RX ORDER — IBUPROFEN 600 MG/1
600 TABLET, FILM COATED ORAL EVERY 6 HOURS PRN
Qty: 30 TABLET | Refills: 0 | Status: SHIPPED | OUTPATIENT
Start: 2022-01-01 | End: 2022-01-01

## 2022-01-01 RX ORDER — ONDANSETRON 2 MG/ML
4 INJECTION INTRAMUSCULAR; INTRAVENOUS EVERY 6 HOURS PRN
Status: DISCONTINUED | OUTPATIENT
Start: 2022-01-01 | End: 2022-01-01

## 2022-01-01 RX ORDER — FLUMAZENIL 0.1 MG/ML
0.2 INJECTION, SOLUTION INTRAVENOUS
Status: DISCONTINUED | OUTPATIENT
Start: 2022-01-01 | End: 2022-01-01 | Stop reason: HOSPADM

## 2022-01-01 RX ORDER — FENTANYL CITRATE 50 UG/ML
INJECTION, SOLUTION INTRAMUSCULAR; INTRAVENOUS PRN
Status: DISCONTINUED | OUTPATIENT
Start: 2022-01-01 | End: 2022-01-01

## 2022-01-01 RX ORDER — MORPHINE SULFATE 10 MG/5ML
2.5-5 SOLUTION ORAL EVERY 6 HOURS PRN
Qty: 15 ML | Refills: 0 | Status: SHIPPED | OUTPATIENT
Start: 2022-01-01

## 2022-01-01 RX ORDER — ACYCLOVIR 200 MG/1
0-1 CAPSULE ORAL
Status: DISCONTINUED | OUTPATIENT
Start: 2022-01-01 | End: 2022-01-01 | Stop reason: HOSPADM

## 2022-01-01 RX ORDER — DEXAMETHASONE SODIUM PHOSPHATE 4 MG/ML
INJECTION, SOLUTION INTRA-ARTICULAR; INTRALESIONAL; INTRAMUSCULAR; INTRAVENOUS; SOFT TISSUE PRN
Status: DISCONTINUED | OUTPATIENT
Start: 2022-01-01 | End: 2022-01-01

## 2022-01-01 RX ORDER — PROCHLORPERAZINE MALEATE 5 MG
5 TABLET ORAL EVERY 6 HOURS PRN
Qty: 10 TABLET | Refills: 0 | Status: SHIPPED | OUTPATIENT
Start: 2022-01-01

## 2022-01-01 RX ORDER — EPHEDRINE SULFATE 50 MG/ML
INJECTION, SOLUTION INTRAMUSCULAR; INTRAVENOUS; SUBCUTANEOUS PRN
Status: DISCONTINUED | OUTPATIENT
Start: 2022-01-01 | End: 2022-01-01

## 2022-01-01 RX ORDER — ALBUMIN (HUMAN) 12.5 G/50ML
25 SOLUTION INTRAVENOUS ONCE
Status: COMPLETED | OUTPATIENT
Start: 2022-01-01 | End: 2022-01-01

## 2022-01-01 RX ORDER — REGADENOSON 0.08 MG/ML
INJECTION, SOLUTION INTRAVENOUS
Status: COMPLETED
Start: 2022-01-01 | End: 2022-01-01

## 2022-01-01 RX ORDER — LIDOCAINE HYDROCHLORIDE 10 MG/ML
10 INJECTION, SOLUTION EPIDURAL; INFILTRATION; INTRACAUDAL; PERINEURAL ONCE
Status: COMPLETED | OUTPATIENT
Start: 2022-01-01 | End: 2022-01-01

## 2022-01-01 RX ORDER — LORAZEPAM 0.5 MG/1
TABLET ORAL
COMMUNITY
End: 2022-01-01

## 2022-01-01 RX ORDER — DOCUSATE SODIUM 100 MG/1
100 CAPSULE, LIQUID FILLED ORAL 2 TIMES DAILY
Status: DISCONTINUED | OUTPATIENT
Start: 2022-01-01 | End: 2022-01-01 | Stop reason: HOSPADM

## 2022-01-01 RX ORDER — CEFAZOLIN SODIUM/WATER 2 G/20 ML
2 SYRINGE (ML) INTRAVENOUS SEE ADMIN INSTRUCTIONS
Status: DISCONTINUED | OUTPATIENT
Start: 2022-01-01 | End: 2022-01-01 | Stop reason: HOSPADM

## 2022-01-01 RX ORDER — POLYETHYLENE GLYCOL 3350 17 G/17G
17 POWDER, FOR SOLUTION ORAL DAILY PRN
Status: DISCONTINUED | OUTPATIENT
Start: 2022-01-01 | End: 2022-01-01

## 2022-01-01 RX ORDER — POLYETHYLENE GLYCOL 3350 17 G/17G
17 POWDER, FOR SOLUTION ORAL DAILY PRN
Status: DISCONTINUED | OUTPATIENT
Start: 2022-01-01 | End: 2022-01-01 | Stop reason: HOSPADM

## 2022-01-01 RX ORDER — ACETAMINOPHEN 650 MG/1
650 SUPPOSITORY RECTAL EVERY 6 HOURS PRN
Status: DISCONTINUED | OUTPATIENT
Start: 2022-01-01 | End: 2022-01-01 | Stop reason: HOSPADM

## 2022-01-01 RX ORDER — ASPIRIN 81 MG/1
324 TABLET, CHEWABLE ORAL ONCE
Status: COMPLETED | OUTPATIENT
Start: 2022-01-01 | End: 2022-01-01

## 2022-01-01 RX ORDER — FLECAINIDE ACETATE 100 MG/1
100 TABLET ORAL 2 TIMES DAILY
Status: DISCONTINUED | OUTPATIENT
Start: 2022-01-01 | End: 2022-01-01 | Stop reason: HOSPADM

## 2022-01-01 RX ORDER — POLYETHYLENE GLYCOL 3350 17 G/17G
17 POWDER, FOR SOLUTION ORAL DAILY
Status: DISCONTINUED | OUTPATIENT
Start: 2022-01-01 | End: 2022-01-01 | Stop reason: HOSPADM

## 2022-01-01 RX ORDER — ONDANSETRON 4 MG/1
4 TABLET, ORALLY DISINTEGRATING ORAL EVERY 6 HOURS PRN
Status: DISCONTINUED | OUTPATIENT
Start: 2022-01-01 | End: 2022-01-01

## 2022-01-01 RX ORDER — GLYCOPYRROLATE 0.2 MG/ML
INJECTION, SOLUTION INTRAMUSCULAR; INTRAVENOUS PRN
Status: DISCONTINUED | OUTPATIENT
Start: 2022-01-01 | End: 2022-01-01

## 2022-01-01 RX ORDER — IBUPROFEN 600 MG/1
600 TABLET, FILM COATED ORAL ONCE
Status: DISCONTINUED | OUTPATIENT
Start: 2022-01-01 | End: 2022-01-01 | Stop reason: HOSPADM

## 2022-01-01 RX ORDER — SIMETHICONE 80 MG
80 TABLET,CHEWABLE ORAL DAILY PRN
Status: DISCONTINUED | OUTPATIENT
Start: 2022-01-01 | End: 2022-01-01

## 2022-01-01 RX ORDER — CARBOXYMETHYLCELLULOSE SODIUM 5 MG/ML
1 SOLUTION/ DROPS OPHTHALMIC 4 TIMES DAILY PRN
Status: DISCONTINUED | OUTPATIENT
Start: 2022-01-01 | End: 2022-01-01 | Stop reason: HOSPADM

## 2022-01-01 RX ORDER — ALBUMIN (HUMAN) 12.5 G/50ML
12.5 SOLUTION INTRAVENOUS ONCE
Status: COMPLETED | OUTPATIENT
Start: 2022-01-01 | End: 2022-01-01

## 2022-01-01 RX ORDER — HEPARIN SODIUM (PORCINE) LOCK FLUSH IV SOLN 100 UNIT/ML 100 UNIT/ML
5-10 SOLUTION INTRAVENOUS
Status: DISCONTINUED | OUTPATIENT
Start: 2022-01-01 | End: 2022-01-01 | Stop reason: HOSPADM

## 2022-01-01 RX ORDER — IOPAMIDOL 755 MG/ML
500 INJECTION, SOLUTION INTRAVASCULAR ONCE
Status: COMPLETED | OUTPATIENT
Start: 2022-01-01 | End: 2022-01-01

## 2022-01-01 RX ORDER — LIDOCAINE HYDROCHLORIDE AND EPINEPHRINE 10; 10 MG/ML; UG/ML
5 INJECTION, SOLUTION INFILTRATION; PERINEURAL ONCE
Status: DISCONTINUED | OUTPATIENT
Start: 2022-01-01 | End: 2022-01-01 | Stop reason: HOSPADM

## 2022-01-01 RX ORDER — CEFAZOLIN SODIUM/WATER 2 G/20 ML
2 SYRINGE (ML) INTRAVENOUS
Status: COMPLETED | OUTPATIENT
Start: 2022-01-01 | End: 2022-01-01

## 2022-01-01 RX ORDER — BISACODYL 10 MG
10 SUPPOSITORY, RECTAL RECTAL ONCE
Status: COMPLETED | OUTPATIENT
Start: 2022-01-01 | End: 2022-01-01

## 2022-01-01 RX ORDER — FLUTICASONE PROPIONATE 50 MCG
1 SPRAY, SUSPENSION (ML) NASAL DAILY PRN
Status: DISCONTINUED | OUTPATIENT
Start: 2022-01-01 | End: 2022-01-01 | Stop reason: HOSPADM

## 2022-01-01 RX ORDER — POLYETHYLENE GLYCOL 400 AND PROPYLENE GLYCOL 4; 3 MG/ML; MG/ML
1 SOLUTION/ DROPS OPHTHALMIC 4 TIMES DAILY PRN
COMMUNITY

## 2022-01-01 RX ORDER — LIDOCAINE HYDROCHLORIDE 20 MG/ML
INJECTION, SOLUTION INFILTRATION; PERINEURAL PRN
Status: DISCONTINUED | OUTPATIENT
Start: 2022-01-01 | End: 2022-01-01

## 2022-01-01 RX ORDER — DOCUSATE SODIUM 100 MG/1
100 CAPSULE, LIQUID FILLED ORAL DAILY PRN
Status: DISCONTINUED | OUTPATIENT
Start: 2022-01-01 | End: 2022-01-01 | Stop reason: HOSPADM

## 2022-01-01 RX ORDER — HEPARIN SODIUM,PORCINE 10 UNIT/ML
5-10 VIAL (ML) INTRAVENOUS
Status: DISCONTINUED | OUTPATIENT
Start: 2022-01-01 | End: 2022-01-01

## 2022-01-01 RX ORDER — MAGNESIUM HYDROXIDE 1200 MG/15ML
LIQUID ORAL PRN
Status: DISCONTINUED | OUTPATIENT
Start: 2022-01-01 | End: 2022-01-01 | Stop reason: HOSPADM

## 2022-01-01 RX ORDER — LORAZEPAM 0.5 MG/1
0.5 TABLET ORAL EVERY 6 HOURS PRN
Status: DISCONTINUED | OUTPATIENT
Start: 2022-01-01 | End: 2022-01-01 | Stop reason: HOSPADM

## 2022-01-01 RX ORDER — ALBUTEROL SULFATE 90 UG/1
2 AEROSOL, METERED RESPIRATORY (INHALATION) EVERY 5 MIN PRN
Status: DISCONTINUED | OUTPATIENT
Start: 2022-01-01 | End: 2022-01-01 | Stop reason: HOSPADM

## 2022-01-01 RX ORDER — LOPERAMIDE HCL 2 MG
2 CAPSULE ORAL 4 TIMES DAILY PRN
COMMUNITY
End: 2022-01-01

## 2022-01-01 RX ORDER — LORAZEPAM 0.5 MG/1
0.5 TABLET ORAL EVERY 6 HOURS PRN
Qty: 10 TABLET | Refills: 0 | Status: SHIPPED | OUTPATIENT
Start: 2022-01-01

## 2022-01-01 RX ORDER — BUPIVACAINE HYDROCHLORIDE AND EPINEPHRINE 5; 5 MG/ML; UG/ML
INJECTION, SOLUTION PERINEURAL PRN
Status: DISCONTINUED | OUTPATIENT
Start: 2022-01-01 | End: 2022-01-01 | Stop reason: HOSPADM

## 2022-01-01 RX ORDER — HEPARIN SODIUM,PORCINE 10 UNIT/ML
5-10 VIAL (ML) INTRAVENOUS EVERY 24 HOURS
Status: DISCONTINUED | OUTPATIENT
Start: 2022-01-01 | End: 2022-01-01 | Stop reason: HOSPADM

## 2022-01-01 RX ORDER — CLINDAMYCIN PHOSPHATE 11.9 MG/ML
SOLUTION TOPICAL DAILY PRN
Status: ON HOLD | COMMUNITY
End: 2022-01-01

## 2022-01-01 RX ORDER — ONDANSETRON 4 MG/1
4 TABLET, ORALLY DISINTEGRATING ORAL EVERY 6 HOURS PRN
Qty: 10 TABLET | Refills: 0 | Status: SHIPPED | OUTPATIENT
Start: 2022-01-01

## 2022-01-01 RX ORDER — NEOSTIGMINE METHYLSULFATE 1 MG/ML
VIAL (ML) INJECTION PRN
Status: DISCONTINUED | OUTPATIENT
Start: 2022-01-01 | End: 2022-01-01

## 2022-01-01 RX ORDER — ONDANSETRON 2 MG/ML
INJECTION INTRAMUSCULAR; INTRAVENOUS PRN
Status: DISCONTINUED | OUTPATIENT
Start: 2022-01-01 | End: 2022-01-01

## 2022-01-01 RX ORDER — FEXOFENADINE HCL 180 MG/1
180 TABLET ORAL DAILY PRN
Status: DISCONTINUED | OUTPATIENT
Start: 2022-01-01 | End: 2022-01-01 | Stop reason: HOSPADM

## 2022-01-01 RX ORDER — MORPHINE SULFATE 10 MG/5ML
5 SOLUTION ORAL
Status: DISCONTINUED | OUTPATIENT
Start: 2022-01-01 | End: 2022-01-01

## 2022-01-01 RX ORDER — DOCUSATE SODIUM 100 MG/1
100-200 CAPSULE, LIQUID FILLED ORAL DAILY PRN
COMMUNITY

## 2022-01-01 RX ORDER — CARBOXYMETHYLCELLULOSE SODIUM 2.5 MG/ML
1 SOLUTION/ DROPS OPHTHALMIC 4 TIMES DAILY PRN
COMMUNITY

## 2022-01-01 RX ORDER — HEPARIN SODIUM,PORCINE 10 UNIT/ML
5-10 VIAL (ML) INTRAVENOUS
Status: DISCONTINUED | OUTPATIENT
Start: 2022-01-01 | End: 2022-01-01 | Stop reason: HOSPADM

## 2022-01-01 RX ORDER — MEPERIDINE HYDROCHLORIDE 25 MG/ML
12.5 INJECTION INTRAMUSCULAR; INTRAVENOUS; SUBCUTANEOUS EVERY 5 MIN PRN
Status: DISCONTINUED | OUTPATIENT
Start: 2022-01-01 | End: 2022-01-01 | Stop reason: HOSPADM

## 2022-01-01 RX ORDER — ALBUTEROL SULFATE 0.83 MG/ML
2.5 SOLUTION RESPIRATORY (INHALATION) EVERY 4 HOURS PRN
Status: DISCONTINUED | OUTPATIENT
Start: 2022-01-01 | End: 2022-01-01 | Stop reason: HOSPADM

## 2022-01-01 RX ADMIN — SODIUM CHLORIDE, POTASSIUM CHLORIDE, SODIUM LACTATE AND CALCIUM CHLORIDE: 600; 310; 30; 20 INJECTION, SOLUTION INTRAVENOUS at 16:51

## 2022-01-01 RX ADMIN — SIMETHICONE 80 MG: 80 TABLET, CHEWABLE ORAL at 21:47

## 2022-01-01 RX ADMIN — Medication 2 G: at 16:57

## 2022-01-01 RX ADMIN — ONDANSETRON 4 MG: 2 INJECTION INTRAMUSCULAR; INTRAVENOUS at 22:19

## 2022-01-01 RX ADMIN — DEXTROSE AND SODIUM CHLORIDE: 5; 900 INJECTION, SOLUTION INTRAVENOUS at 10:05

## 2022-01-01 RX ADMIN — SIMETHICONE 80 MG: 80 TABLET, CHEWABLE ORAL at 10:32

## 2022-01-01 RX ADMIN — MORPHINE SULFATE 2.5 MG: 10 SOLUTION ORAL at 17:12

## 2022-01-01 RX ADMIN — LORAZEPAM 0.5 MG: 0.5 TABLET ORAL at 03:16

## 2022-01-01 RX ADMIN — MORPHINE SULFATE 2.5 MG: 10 SOLUTION ORAL at 08:25

## 2022-01-01 RX ADMIN — SODIUM CHLORIDE, PRESERVATIVE FREE 5 ML: 5 INJECTION INTRAVENOUS at 06:18

## 2022-01-01 RX ADMIN — Medication 5 ML: at 16:15

## 2022-01-01 RX ADMIN — MORPHINE SULFATE 2.5 MG: 10 SOLUTION ORAL at 20:12

## 2022-01-01 RX ADMIN — LIDOCAINE HYDROCHLORIDE 10 ML: 10 INJECTION, SOLUTION EPIDURAL; INFILTRATION; INTRACAUDAL; PERINEURAL at 11:07

## 2022-01-01 RX ADMIN — LIDOCAINE AND PRILOCAINE 1 G: 25; 25 CREAM TOPICAL at 14:28

## 2022-01-01 RX ADMIN — POLYETHYLENE GLYCOL 3350 17 G: 17 POWDER, FOR SOLUTION ORAL at 08:55

## 2022-01-01 RX ADMIN — IOPAMIDOL 68 ML: 755 INJECTION, SOLUTION INTRAVENOUS at 19:41

## 2022-01-01 RX ADMIN — MORPHINE SULFATE 2.5 MG: 10 SOLUTION ORAL at 10:32

## 2022-01-01 RX ADMIN — FLECAINIDE ACETATE 50 MG: 50 TABLET ORAL at 20:14

## 2022-01-01 RX ADMIN — TETROFOSMIN 30 MCI.: 1.38 INJECTION, POWDER, LYOPHILIZED, FOR SOLUTION INTRAVENOUS at 13:23

## 2022-01-01 RX ADMIN — PHENYLEPHRINE HYDROCHLORIDE 100 MCG: 10 INJECTION INTRAVENOUS at 17:15

## 2022-01-01 RX ADMIN — ALTEPLASE 2 MG: 2.2 INJECTION, POWDER, LYOPHILIZED, FOR SOLUTION INTRAVENOUS at 11:58

## 2022-01-01 RX ADMIN — LIDOCAINE AND PRILOCAINE 1 G: 25; 25 CREAM TOPICAL at 14:59

## 2022-01-01 RX ADMIN — FLECAINIDE ACETATE 50 MG: 50 TABLET ORAL at 19:47

## 2022-01-01 RX ADMIN — LORAZEPAM 0.5 MG: 0.5 TABLET ORAL at 19:59

## 2022-01-01 RX ADMIN — Medication 5 ML: at 14:37

## 2022-01-01 RX ADMIN — FLECAINIDE ACETATE 50 MG: 50 TABLET ORAL at 20:07

## 2022-01-01 RX ADMIN — ONDANSETRON 4 MG: 2 INJECTION INTRAMUSCULAR; INTRAVENOUS at 14:17

## 2022-01-01 RX ADMIN — Medication 5 ML: at 11:42

## 2022-01-01 RX ADMIN — POLYETHYLENE GLYCOL 3350 17 G: 17 POWDER, FOR SOLUTION ORAL at 08:54

## 2022-01-01 RX ADMIN — SODIUM CHLORIDE, PRESERVATIVE FREE 5 ML: 5 INJECTION INTRAVENOUS at 06:05

## 2022-01-01 RX ADMIN — ONDANSETRON 4 MG: 2 INJECTION INTRAMUSCULAR; INTRAVENOUS at 09:01

## 2022-01-01 RX ADMIN — ASPIRIN 81 MG CHEWABLE TABLET 324 MG: 81 TABLET CHEWABLE at 21:29

## 2022-01-01 RX ADMIN — SIMETHICONE 80 MG: 80 TABLET, CHEWABLE ORAL at 20:23

## 2022-01-01 RX ADMIN — PROCHLORPERAZINE MALEATE 5 MG: 5 TABLET ORAL at 18:40

## 2022-01-01 RX ADMIN — FLECAINIDE ACETATE 50 MG: 50 TABLET ORAL at 20:18

## 2022-01-01 RX ADMIN — MORPHINE SULFATE 2.5 MG: 10 SOLUTION ORAL at 15:07

## 2022-01-01 RX ADMIN — ACETAMINOPHEN 650 MG: 325 TABLET, FILM COATED ORAL at 20:15

## 2022-01-01 RX ADMIN — Medication 25 MCG: at 07:52

## 2022-01-01 RX ADMIN — ONDANSETRON 4 MG: 4 TABLET, ORALLY DISINTEGRATING ORAL at 08:55

## 2022-01-01 RX ADMIN — DEXAMETHASONE SODIUM PHOSPHATE 4 MG: 4 INJECTION, SOLUTION INTRA-ARTICULAR; INTRALESIONAL; INTRAMUSCULAR; INTRAVENOUS; SOFT TISSUE at 17:10

## 2022-01-01 RX ADMIN — LORAZEPAM 0.5 MG: 0.5 TABLET ORAL at 03:08

## 2022-01-01 RX ADMIN — ACETAMINOPHEN 650 MG: 325 TABLET, FILM COATED ORAL at 10:06

## 2022-01-01 RX ADMIN — MORPHINE SULFATE 2.5 MG: 10 SOLUTION ORAL at 03:18

## 2022-01-01 RX ADMIN — NEOSTIGMINE METHYLSULFATE 5 MG: 1 INJECTION, SOLUTION INTRAVENOUS at 18:53

## 2022-01-01 RX ADMIN — MORPHINE SULFATE 2.5 MG: 10 SOLUTION ORAL at 20:13

## 2022-01-01 RX ADMIN — MORPHINE SULFATE 2.5 MG: 10 SOLUTION ORAL at 18:11

## 2022-01-01 RX ADMIN — LORAZEPAM 0.5 MG: 0.5 TABLET ORAL at 19:32

## 2022-01-01 RX ADMIN — SODIUM CHLORIDE 80 ML: 9 INJECTION, SOLUTION INTRAVENOUS at 19:42

## 2022-01-01 RX ADMIN — SIMETHICONE 80 MG: 80 TABLET, CHEWABLE ORAL at 10:05

## 2022-01-01 RX ADMIN — DOCUSATE SODIUM 200 MG: 100 CAPSULE, LIQUID FILLED ORAL at 18:18

## 2022-01-01 RX ADMIN — FLECAINIDE ACETATE 50 MG: 50 TABLET ORAL at 11:04

## 2022-01-01 RX ADMIN — SODIUM CHLORIDE 60 ML: 900 INJECTION INTRAVENOUS at 16:49

## 2022-01-01 RX ADMIN — FLECAINIDE ACETATE 50 MG: 50 TABLET ORAL at 07:31

## 2022-01-01 RX ADMIN — ACETAMINOPHEN AND CODEINE PHOSPHATE 1 TABLET: 300; 30 TABLET ORAL at 10:41

## 2022-01-01 RX ADMIN — SODIUM CHLORIDE, PRESERVATIVE FREE 5 ML: 5 INJECTION INTRAVENOUS at 06:38

## 2022-01-01 RX ADMIN — FLECAINIDE ACETATE 50 MG: 50 TABLET ORAL at 08:55

## 2022-01-01 RX ADMIN — MIDAZOLAM HYDROCHLORIDE 1 MG: 1 INJECTION, SOLUTION INTRAMUSCULAR; INTRAVENOUS at 08:55

## 2022-01-01 RX ADMIN — FLECAINIDE ACETATE 100 MG: 100 TABLET ORAL at 19:17

## 2022-01-01 RX ADMIN — SODIUM CHLORIDE, PRESERVATIVE FREE 5 ML: 5 INJECTION INTRAVENOUS at 06:56

## 2022-01-01 RX ADMIN — REGADENOSON 0.4 MG: 0.08 INJECTION, SOLUTION INTRAVENOUS at 13:20

## 2022-01-01 RX ADMIN — PROPOFOL 30 MCG/KG/MIN: 10 INJECTION, EMULSION INTRAVENOUS at 17:15

## 2022-01-01 RX ADMIN — Medication 5 ML: at 11:15

## 2022-01-01 RX ADMIN — Medication 50 MCG: at 08:55

## 2022-01-01 RX ADMIN — FLECAINIDE ACETATE 100 MG: 100 TABLET ORAL at 19:52

## 2022-01-01 RX ADMIN — SODIUM CHLORIDE, POTASSIUM CHLORIDE, SODIUM LACTATE AND CALCIUM CHLORIDE 500 ML: 600; 310; 30; 20 INJECTION, SOLUTION INTRAVENOUS at 08:56

## 2022-01-01 RX ADMIN — ONDANSETRON 4 MG: 4 TABLET, ORALLY DISINTEGRATING ORAL at 13:31

## 2022-01-01 RX ADMIN — BISACODYL 10 MG: 10 SUPPOSITORY RECTAL at 11:45

## 2022-01-01 RX ADMIN — SODIUM CHLORIDE, POTASSIUM CHLORIDE, SODIUM LACTATE AND CALCIUM CHLORIDE 1000 ML: 600; 310; 30; 20 INJECTION, SOLUTION INTRAVENOUS at 20:06

## 2022-01-01 RX ADMIN — Medication 5 ML: at 18:18

## 2022-01-01 RX ADMIN — FLECAINIDE ACETATE 50 MG: 50 TABLET ORAL at 07:37

## 2022-01-01 RX ADMIN — FLECAINIDE ACETATE 100 MG: 100 TABLET ORAL at 08:11

## 2022-01-01 RX ADMIN — LIDOCAINE HYDROCHLORIDE 80 MG: 20 INJECTION, SOLUTION INFILTRATION; PERINEURAL at 17:10

## 2022-01-01 RX ADMIN — FLECAINIDE ACETATE 50 MG: 50 TABLET ORAL at 19:59

## 2022-01-01 RX ADMIN — MORPHINE SULFATE 2.5 MG: 10 SOLUTION ORAL at 10:29

## 2022-01-01 RX ADMIN — MORPHINE SULFATE 2.5 MG: 10 SOLUTION ORAL at 06:34

## 2022-01-01 RX ADMIN — SODIUM CHLORIDE, POTASSIUM CHLORIDE, SODIUM LACTATE AND CALCIUM CHLORIDE 1000 ML: 600; 310; 30; 20 INJECTION, SOLUTION INTRAVENOUS at 15:42

## 2022-01-01 RX ADMIN — SIMETHICONE 80 MG: 80 TABLET, CHEWABLE ORAL at 17:08

## 2022-01-01 RX ADMIN — SODIUM CHLORIDE, POTASSIUM CHLORIDE, SODIUM LACTATE AND CALCIUM CHLORIDE: 600; 310; 30; 20 INJECTION, SOLUTION INTRAVENOUS at 17:35

## 2022-01-01 RX ADMIN — SODIUM CHLORIDE 60 ML: 9 INJECTION, SOLUTION INTRAVENOUS at 17:42

## 2022-01-01 RX ADMIN — Medication 5 ML: at 05:59

## 2022-01-01 RX ADMIN — DOCUSATE SODIUM 200 MG: 100 CAPSULE, LIQUID FILLED ORAL at 18:35

## 2022-01-01 RX ADMIN — ASPIRIN 81 MG: 81 TABLET, COATED ORAL at 08:11

## 2022-01-01 RX ADMIN — GADOBUTROL 6.5 ML: 604.72 INJECTION INTRAVENOUS at 03:10

## 2022-01-01 RX ADMIN — HEPARIN SODIUM 5000 UNITS: 5000 INJECTION, SOLUTION INTRAVENOUS; SUBCUTANEOUS at 11:10

## 2022-01-01 RX ADMIN — Medication 2 SPRAY: at 09:56

## 2022-01-01 RX ADMIN — MORPHINE SULFATE 5 MG: 10 SOLUTION ORAL at 22:47

## 2022-01-01 RX ADMIN — HEPARIN SODIUM 5000 UNITS: 5000 INJECTION, SOLUTION INTRAVENOUS; SUBCUTANEOUS at 23:33

## 2022-01-01 RX ADMIN — SODIUM CHLORIDE, POTASSIUM CHLORIDE, SODIUM LACTATE AND CALCIUM CHLORIDE 1000 ML: 600; 310; 30; 20 INJECTION, SOLUTION INTRAVENOUS at 18:42

## 2022-01-01 RX ADMIN — FLECAINIDE ACETATE 50 MG: 50 TABLET ORAL at 09:07

## 2022-01-01 RX ADMIN — ONDANSETRON 4 MG: 2 INJECTION INTRAMUSCULAR; INTRAVENOUS at 17:51

## 2022-01-01 RX ADMIN — THERA TABS 1 TABLET: TAB at 07:51

## 2022-01-01 RX ADMIN — FENTANYL CITRATE 50 MCG: 50 INJECTION, SOLUTION INTRAMUSCULAR; INTRAVENOUS at 19:29

## 2022-01-01 RX ADMIN — Medication 25 MCG: at 07:42

## 2022-01-01 RX ADMIN — Medication 5 MG: at 17:33

## 2022-01-01 RX ADMIN — SIMETHICONE 80 MG: 80 TABLET, CHEWABLE ORAL at 17:47

## 2022-01-01 RX ADMIN — ALBUMIN HUMAN 25 G: 0.25 SOLUTION INTRAVENOUS at 20:31

## 2022-01-01 RX ADMIN — MORPHINE SULFATE 2.5 MG: 10 SOLUTION ORAL at 19:32

## 2022-01-01 RX ADMIN — Medication 2 SPRAY: at 18:13

## 2022-01-01 RX ADMIN — FLECAINIDE ACETATE 50 MG: 50 TABLET ORAL at 08:23

## 2022-01-01 RX ADMIN — FLECAINIDE ACETATE 50 MG: 50 TABLET ORAL at 20:37

## 2022-01-01 RX ADMIN — MORPHINE SULFATE 5 MG: 10 SOLUTION ORAL at 19:21

## 2022-01-01 RX ADMIN — LIDOCAINE HYDROCHLORIDE 12 ML: 10 INJECTION, SOLUTION INFILTRATION; PERINEURAL at 09:04

## 2022-01-01 RX ADMIN — TETROFOSMIN 9.5 MCI.: 1.38 INJECTION, POWDER, LYOPHILIZED, FOR SOLUTION INTRAVENOUS at 11:48

## 2022-01-01 RX ADMIN — LORAZEPAM 0.5 MG: 0.5 TABLET ORAL at 02:35

## 2022-01-01 RX ADMIN — Medication 50 MCG: at 08:43

## 2022-01-01 RX ADMIN — MORPHINE SULFATE 5 MG: 10 SOLUTION ORAL at 14:26

## 2022-01-01 RX ADMIN — LORAZEPAM 0.5 MG: 0.5 TABLET ORAL at 22:07

## 2022-01-01 RX ADMIN — Medication 100 UNITS: at 19:41

## 2022-01-01 RX ADMIN — SODIUM CHLORIDE, POTASSIUM CHLORIDE, SODIUM LACTATE AND CALCIUM CHLORIDE 1000 ML: 600; 310; 30; 20 INJECTION, SOLUTION INTRAVENOUS at 17:41

## 2022-01-01 RX ADMIN — LORAZEPAM 0.5 MG: 0.5 TABLET ORAL at 20:14

## 2022-01-01 RX ADMIN — FLECAINIDE ACETATE 100 MG: 100 TABLET ORAL at 07:50

## 2022-01-01 RX ADMIN — FLECAINIDE ACETATE 100 MG: 100 TABLET ORAL at 08:13

## 2022-01-01 RX ADMIN — FLECAINIDE ACETATE 50 MG: 50 TABLET ORAL at 08:43

## 2022-01-01 RX ADMIN — POLYETHYLENE GLYCOL 3350 17 G: 17 POWDER, FOR SOLUTION ORAL at 09:59

## 2022-01-01 RX ADMIN — DOCUSATE SODIUM 200 MG: 100 CAPSULE, LIQUID FILLED ORAL at 20:15

## 2022-01-01 RX ADMIN — CEFAZOLIN SODIUM 2 G: 2 INJECTION, SOLUTION INTRAVENOUS at 08:21

## 2022-01-01 RX ADMIN — LORAZEPAM 0.5 MG: 0.5 TABLET ORAL at 20:18

## 2022-01-01 RX ADMIN — ONDANSETRON 4 MG: 2 INJECTION INTRAMUSCULAR; INTRAVENOUS at 19:45

## 2022-01-01 RX ADMIN — PROCHLORPERAZINE EDISYLATE 5 MG: 5 INJECTION INTRAMUSCULAR; INTRAVENOUS at 17:40

## 2022-01-01 RX ADMIN — Medication 25 MCG: at 09:46

## 2022-01-01 RX ADMIN — SODIUM CHLORIDE, PRESERVATIVE FREE 5 ML: 5 INJECTION INTRAVENOUS at 06:35

## 2022-01-01 RX ADMIN — LACTULOSE 10 G: 10 SOLUTION ORAL at 14:30

## 2022-01-01 RX ADMIN — FENTANYL CITRATE 50 MCG: 50 INJECTION, SOLUTION INTRAMUSCULAR; INTRAVENOUS at 17:10

## 2022-01-01 RX ADMIN — FLECAINIDE ACETATE 100 MG: 100 TABLET ORAL at 08:28

## 2022-01-01 RX ADMIN — DOCUSATE SODIUM 100 MG: 100 CAPSULE, LIQUID FILLED ORAL at 18:30

## 2022-01-01 RX ADMIN — ONDANSETRON 4 MG: 4 TABLET, ORALLY DISINTEGRATING ORAL at 19:59

## 2022-01-01 RX ADMIN — POLYETHYLENE GLYCOL 3350 17 G: 17 POWDER, FOR SOLUTION ORAL at 09:10

## 2022-01-01 RX ADMIN — POLYETHYLENE GLYCOL 3350 17 G: 17 POWDER, FOR SOLUTION ORAL at 07:36

## 2022-01-01 RX ADMIN — FLECAINIDE ACETATE 50 MG: 50 TABLET ORAL at 19:32

## 2022-01-01 RX ADMIN — ROCURONIUM BROMIDE 50 MG: 50 INJECTION, SOLUTION INTRAVENOUS at 17:10

## 2022-01-01 RX ADMIN — POLYETHYLENE GLYCOL 3350, SODIUM SULFATE ANHYDROUS, SODIUM BICARBONATE, SODIUM CHLORIDE, POTASSIUM CHLORIDE 1000 ML: 236; 22.74; 6.74; 5.86; 2.97 POWDER, FOR SOLUTION ORAL at 07:58

## 2022-01-01 RX ADMIN — PHENYLEPHRINE HYDROCHLORIDE 150 MCG: 10 INJECTION INTRAVENOUS at 17:22

## 2022-01-01 RX ADMIN — Medication 5 ML: at 17:40

## 2022-01-01 RX ADMIN — IOPAMIDOL 65 ML: 755 INJECTION, SOLUTION INTRAVENOUS at 16:49

## 2022-01-01 RX ADMIN — FLECAINIDE ACETATE 100 MG: 100 TABLET ORAL at 20:11

## 2022-01-01 RX ADMIN — LACTULOSE 10 G: 10 SOLUTION ORAL at 09:33

## 2022-01-01 RX ADMIN — PHENYLEPHRINE HYDROCHLORIDE 100 MCG: 10 INJECTION INTRAVENOUS at 17:07

## 2022-01-01 RX ADMIN — FLECAINIDE ACETATE 50 MG: 50 TABLET ORAL at 08:14

## 2022-01-01 RX ADMIN — ACETAMINOPHEN 650 MG: 325 TABLET, FILM COATED ORAL at 12:27

## 2022-01-01 RX ADMIN — Medication 25 MCG: at 10:25

## 2022-01-01 RX ADMIN — LORAZEPAM 0.5 MG: 0.5 TABLET ORAL at 19:47

## 2022-01-01 RX ADMIN — FLECAINIDE ACETATE 100 MG: 100 TABLET ORAL at 19:53

## 2022-01-01 RX ADMIN — HYDROMORPHONE HYDROCHLORIDE 0.5 MG: 1 INJECTION, SOLUTION INTRAMUSCULAR; INTRAVENOUS; SUBCUTANEOUS at 18:21

## 2022-01-01 RX ADMIN — ALBUMIN HUMAN 12.5 G: 0.25 SOLUTION INTRAVENOUS at 17:04

## 2022-01-01 RX ADMIN — GLYCOPYRROLATE 0.8 MG: 0.2 INJECTION, SOLUTION INTRAMUSCULAR; INTRAVENOUS at 18:53

## 2022-01-01 RX ADMIN — IOPAMIDOL 66 ML: 755 INJECTION, SOLUTION INTRAVENOUS at 17:42

## 2022-01-01 RX ADMIN — POLYETHYLENE GLYCOL 3350 17 G: 17 POWDER, FOR SOLUTION ORAL at 09:47

## 2022-01-01 RX ADMIN — B-COMPLEX W/ C & FOLIC ACID TAB 1 TABLET: TAB at 08:13

## 2022-01-01 RX ADMIN — MIDAZOLAM HYDROCHLORIDE 1 MG: 1 INJECTION, SOLUTION INTRAMUSCULAR; INTRAVENOUS at 09:01

## 2022-01-01 RX ADMIN — MORPHINE SULFATE 2.5 MG: 10 SOLUTION ORAL at 07:31

## 2022-01-01 RX ADMIN — ONDANSETRON 4 MG: 4 TABLET, ORALLY DISINTEGRATING ORAL at 09:06

## 2022-01-01 RX ADMIN — FENTANYL CITRATE 50 MCG: 50 INJECTION, SOLUTION INTRAMUSCULAR; INTRAVENOUS at 17:24

## 2022-01-01 RX ADMIN — POLYETHYLENE GLYCOL 3350 17 G: 17 POWDER, FOR SOLUTION ORAL at 15:23

## 2022-01-01 RX ADMIN — ONDANSETRON 4 MG: 4 TABLET, ORALLY DISINTEGRATING ORAL at 16:14

## 2022-01-01 RX ADMIN — HEPARIN SODIUM 5000 UNITS: 5000 INJECTION, SOLUTION INTRAVENOUS; SUBCUTANEOUS at 22:42

## 2022-01-01 RX ADMIN — ACETAMINOPHEN 975 MG: 325 TABLET ORAL at 04:07

## 2022-01-01 RX ADMIN — SODIUM CHLORIDE, PRESERVATIVE FREE 5 ML: 5 INJECTION INTRAVENOUS at 06:42

## 2022-01-01 RX ADMIN — POLYETHYLENE GLYCOL 3350 17 G: 17 POWDER, FOR SOLUTION ORAL at 10:33

## 2022-01-01 RX ADMIN — ONDANSETRON 4 MG: 4 TABLET, ORALLY DISINTEGRATING ORAL at 11:04

## 2022-01-01 ASSESSMENT — ENCOUNTER SYMPTOMS
SLEEP DISTURBANCE: 1
ABDOMINAL DISTENTION: 1
DIAPHORESIS: 1
FEVER: 1
FATIGUE: 1
APPETITE CHANGE: 1
FEVER: 0
DYSRHYTHMIAS: 1
ABDOMINAL DISTENTION: 1
WEAKNESS: 1
DIAPHORESIS: 1
NAUSEA: 1
BLOOD IN STOOL: 0
ABDOMINAL PAIN: 0
APPETITE CHANGE: 1
SHORTNESS OF BREATH: 1
ABDOMINAL DISTENTION: 1

## 2022-01-01 ASSESSMENT — ACTIVITIES OF DAILY LIVING (ADL)
ADLS_ACUITY_SCORE: 28
ADLS_ACUITY_SCORE: 22
DIFFICULTY_COMMUNICATING: NO
ADLS_ACUITY_SCORE: 23
ADLS_ACUITY_SCORE: 18
ADLS_ACUITY_SCORE: 20
PREVIOUS_RESPONSIBILITIES: MEAL PREP;HOUSEKEEPING;LAUNDRY;MEDICATION MANAGEMENT;FINANCES;DRIVING
ADLS_ACUITY_SCORE: 26
ADLS_ACUITY_SCORE: 28
ADLS_ACUITY_SCORE: 20
ADLS_ACUITY_SCORE: 24
ADLS_ACUITY_SCORE: 23
ADLS_ACUITY_SCORE: 20
ADLS_ACUITY_SCORE: 35
ADLS_ACUITY_SCORE: 35
ADLS_ACUITY_SCORE: 26
ADLS_ACUITY_SCORE: 20
WEAR_GLASSES_OR_BLIND: NO
ADLS_ACUITY_SCORE: 38
ADLS_ACUITY_SCORE: 35
ADLS_ACUITY_SCORE: 23
ADLS_ACUITY_SCORE: 20
ADLS_ACUITY_SCORE: 24
ADLS_ACUITY_SCORE: 26
ADLS_ACUITY_SCORE: 20
ADLS_ACUITY_SCORE: 20
ADLS_ACUITY_SCORE: 28
ADLS_ACUITY_SCORE: 20
ADLS_ACUITY_SCORE: 22
ADLS_ACUITY_SCORE: 24
ADLS_ACUITY_SCORE: 20
ADLS_ACUITY_SCORE: 35
ADLS_ACUITY_SCORE: 20
ADLS_ACUITY_SCORE: 24
ADLS_ACUITY_SCORE: 23
FALL_HISTORY_WITHIN_LAST_SIX_MONTHS: NO
ADLS_ACUITY_SCORE: 28
ADLS_ACUITY_SCORE: 20
ADLS_ACUITY_SCORE: 35
ADLS_ACUITY_SCORE: 23
ADLS_ACUITY_SCORE: 23
ADLS_ACUITY_SCORE: 26
ADLS_ACUITY_SCORE: 20
ADLS_ACUITY_SCORE: 26
ADLS_ACUITY_SCORE: 20
ADLS_ACUITY_SCORE: 20
ADLS_ACUITY_SCORE: 23
ADLS_ACUITY_SCORE: 20
ADLS_ACUITY_SCORE: 28
ADLS_ACUITY_SCORE: 23
ADLS_ACUITY_SCORE: 26
ADLS_ACUITY_SCORE: 38
ADLS_ACUITY_SCORE: 24
ADLS_ACUITY_SCORE: 20
ADLS_ACUITY_SCORE: 22
ADLS_ACUITY_SCORE: 20
CONCENTRATING,_REMEMBERING_OR_MAKING_DECISIONS_DIFFICULTY: NO
ADLS_ACUITY_SCORE: 35
ADLS_ACUITY_SCORE: 22
CHANGE_IN_FUNCTIONAL_STATUS_SINCE_ONSET_OF_CURRENT_ILLNESS/INJURY: YES
ADLS_ACUITY_SCORE: 20
ADLS_ACUITY_SCORE: 20
ADLS_ACUITY_SCORE: 28
ADLS_ACUITY_SCORE: 23
ADLS_ACUITY_SCORE: 20
ADLS_ACUITY_SCORE: 24
ADLS_ACUITY_SCORE: 20
ADLS_ACUITY_SCORE: 28
ADLS_ACUITY_SCORE: 24
ADLS_ACUITY_SCORE: 28
ADLS_ACUITY_SCORE: 26
ADLS_ACUITY_SCORE: 28
ADLS_ACUITY_SCORE: 20
ADLS_ACUITY_SCORE: 20
ADLS_ACUITY_SCORE: 38
ADLS_ACUITY_SCORE: 22
ADLS_ACUITY_SCORE: 20
ADLS_ACUITY_SCORE: 20
ADLS_ACUITY_SCORE: 35
ADLS_ACUITY_SCORE: 20
DRESSING/BATHING_DIFFICULTY: NO
ADLS_ACUITY_SCORE: 18
ADLS_ACUITY_SCORE: 28
ADLS_ACUITY_SCORE: 20
ADLS_ACUITY_SCORE: 24
ADLS_ACUITY_SCORE: 23
ADLS_ACUITY_SCORE: 20
ADLS_ACUITY_SCORE: 28
ADLS_ACUITY_SCORE: 35
ADLS_ACUITY_SCORE: 23
ADLS_ACUITY_SCORE: 26
ADLS_ACUITY_SCORE: 26
ADLS_ACUITY_SCORE: 22
ADLS_ACUITY_SCORE: 23
ADLS_ACUITY_SCORE: 22
DIFFICULTY_EATING/SWALLOWING: NO
ADLS_ACUITY_SCORE: 38
ADLS_ACUITY_SCORE: 28
ADLS_ACUITY_SCORE: 18
ADLS_ACUITY_SCORE: 22
ADLS_ACUITY_SCORE: 23
ADLS_ACUITY_SCORE: 35
ADLS_ACUITY_SCORE: 20
ADLS_ACUITY_SCORE: 22
ADLS_ACUITY_SCORE: 24
ADLS_ACUITY_SCORE: 35
ADLS_ACUITY_SCORE: 23
ADLS_ACUITY_SCORE: 28
ADLS_ACUITY_SCORE: 18
ADLS_ACUITY_SCORE: 20
ADLS_ACUITY_SCORE: 28
ADLS_ACUITY_SCORE: 20
WALKING_OR_CLIMBING_STAIRS_DIFFICULTY: NO
ADLS_ACUITY_SCORE: 24
ADLS_ACUITY_SCORE: 24
ADLS_ACUITY_SCORE: 35
ADLS_ACUITY_SCORE: 35
ADLS_ACUITY_SCORE: 23
ADLS_ACUITY_SCORE: 23
ADLS_ACUITY_SCORE: 28
ADLS_ACUITY_SCORE: 22
ADLS_ACUITY_SCORE: 23
ADLS_ACUITY_SCORE: 35
HEARING_DIFFICULTY_OR_DEAF: NO
ADLS_ACUITY_SCORE: 20
ADLS_ACUITY_SCORE: 33
ADLS_ACUITY_SCORE: 35
ADLS_ACUITY_SCORE: 24
TOILETING_ISSUES: NO
ADLS_ACUITY_SCORE: 33
ADLS_ACUITY_SCORE: 26
ADLS_ACUITY_SCORE: 20
ADLS_ACUITY_SCORE: 22
ADLS_ACUITY_SCORE: 20
ADLS_ACUITY_SCORE: 24
ADLS_ACUITY_SCORE: 23
IADL_COMMENTS: RETIRED.
ADLS_ACUITY_SCORE: 20
ADLS_ACUITY_SCORE: 26
DOING_ERRANDS_INDEPENDENTLY_DIFFICULTY: NO
ADLS_ACUITY_SCORE: 20

## 2022-01-01 ASSESSMENT — PAIN SCALES - GENERAL: PAINLEVEL: MILD PAIN (2)

## 2022-01-05 NOTE — TELEPHONE ENCOUNTER
Received oncology OV dated 12/28/2021.from [facility].  Sent to HIM to scan.  Copy of labs sent to team entering outside labs.   FELY Jaffe

## 2022-02-01 NOTE — TELEPHONE ENCOUNTER
Received OV and labs from [facility] dated 1/28/2022.  Sent to HIM to scan and copy of labs sent to lab entry team.  FELY Jaffe

## 2022-03-30 NOTE — TELEPHONE ENCOUNTER
3/30/22 Pt called stating she is receiving chemotherapy and her anti emetic medication has been changed. She now gets Kytril infusions BID on the day of her chemo. She has already had 2 days ( 3 weeks apart) of chemo/Kyril.  Her oncologist advised her to check w Dr Martin as Kyril can adversely interact with Flecainide, causing prolonged QT interval .   Pt has one more round of chemo/Kyril in approx 3 weeks   Pt states she has been feeling fine but wanted to check.  Will route question to Dr Martin and call pt back with recommendations   Pt voiced understanding and agreement with plan.   Hyacinth 850 am

## 2022-03-30 NOTE — TELEPHONE ENCOUNTER
"3/30/22 Msg recd from Dr Martin  It is probably OK because her QTc is \"so normal\" to begin with while on flecainide.   Can she have ECG shortly after receiving Kytril (to be certain it is OK)?  That would be ideal.   Spoke wpt and explained recommendations. She does not want to come today for EKG as she missed dose of Flecainide last lulu and she believes the EKG would not be accurate. Her next round of chemo is scheduled for 4/19 so will schedule EKG for 1 pm the same day.  Pt voiced understanding and agreement with plan.   Hyacinth 1250 pm  "

## 2022-04-19 NOTE — PROGRESS NOTES
EKG run per Dr. Martin.  Results given to FELY Garcia.    Advised Dr. Martin will review and patient will be notified.    ALANIS DunhamT

## 2022-04-19 NOTE — TELEPHONE ENCOUNTER
Spoke to patient to let her know that EKG was normal no concerns.  Patient provided verbal understanding.  FELY Jaffe

## 2022-05-09 PROBLEM — C80.0 CARCINOMATOSIS (H): Status: ACTIVE | Noted: 2021-01-01

## 2022-05-16 PROBLEM — M62.89 PELVIC FLOOR DYSFUNCTION: Status: ACTIVE | Noted: 2022-01-01

## 2022-05-16 PROBLEM — C57.00: Status: ACTIVE | Noted: 2022-01-01

## 2022-05-16 NOTE — PROGRESS NOTES
[facility]    OUTPATIENT Physical Therapy ORTHOPEDIC EVALUATION  PLAN OF TREATMENT FOR OUTPATIENT REHABILITATION  (COMPLETE FOR INITIAL CLAIMS ONLY)  Patient's Last Name, First Name, M.I.  YOB: 1950  TanvirYaneli    Provider s Name:  [facility]   Medical Record No.  [id number]   Start of Care Date:  05/16/22   Onset Date:   04/20/22 (date of PT order)   Type:     _X__PT   ___OT Medical Diagnosis:    Encounter Diagnoses   Name Primary?    Primary malignant neoplasm of fallopian tube (H)     Pelvic floor dysfunction Yes        Treatment Diagnosis:  pelvic floor dysfunction        Goals:     05/16/22 0700   Urinary Leakage   Previous Functional Level No problems   Current Functional Level Leakage with walking   Performance Level downhill, 1 pantiliner   STG Target Performance Decrease leakage with   Performance Level downhill walking by 50%   Rationale continence throughout the day;for healthy hygiene and to prevent skin breakdown   Due Date 06/13/22   LTG Target Performance Decrease leakage with   Performance Level no leakage with walking downhill   Rationale continence throughout the day;for healthy hygiene and to prevent skin breakdown   Due Date 07/11/22       Therapy Frequency:  1x month  Predicted Duration of Therapy Intervention:  3 months    Laura Delgado, PT                 I CERTIFY THE NEED FOR THESE SERVICES FURNISHED UNDER        THIS PLAN OF TREATMENT AND WHILE UNDER MY CARE .             Physician Signature               Date    X_____________________________________________________                         Certification Date From:  05/16/22   Certification Date To:  08/13/22    Referring Provider:  Jordana Londono    Initial Assessment        See Epic Evaluation SOC Date: 05/16/22

## 2022-05-16 NOTE — PROGRESS NOTES
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 She is scheduled for surgery 06/09/2022 (B salpingo-oopherectomy, omentectomy, possible lymphadenectomy). Reports they decided to do chemotherapy first and the chemo just \"corroded\" my insides, which last chemo treatment was April 19th. Symptoms include mild urinary incontinence and incomplete emptying of bowels. Since onset symptoms have been getting better, worse or staying the same? Better. Goals are to eliminate urine leakage.     Urination:  Do you leak on the way to the bathroom or with a strong urge to void? No    Do you leak with cough,sneeze, jumping, running?Yes, very occasional   Any other activities that cause leaking? Walking downhill after walking uphill   Do you have triggers that make you feel you can't wait to go to the bathroom? No   what are theyNA.  Type of pad and number used per day? 1/day  When you leak what is the amount? Small drops  Voiding daytime every 2-4 hours  How long can you delay the need to urinate? 2-4 hours.   How many times do you get up to urinate at night? 1-2    Can you stop the flow of urine when on the toilet? Yes  Is the volume of urine passed usually: large. (8sec rule=  250ml with average bladder storing  400-600ml)    Do you strain to pass urine? No  Do you have a slow or hesitant urinary stream? Yes  Do you have difficulty initiating the urine stream? Yes  Is urination painful?  No    How many bladder infections have you had in last 12 months?0    Fluid intake(one glass is 8oz or one cup) 7 glasses/day, 1.5 caffinated glasses/day  0 alcohol glasses/day.    Bowel habits:  Frequency of bowel movements? 1-3 times a day, on low fiber diet  Consistancy of stool? varies, Wilkes Stool Scale NA  Do you ignore the urge to defecate? No  Do you strain to pass stool? Yes, felt incomplete emptying but seems to be " improving. Takes Miralax/citrucel and colase during chemo only.    Pelvic Pain:  Do you have any pelvic pain with intercourse, exams, use of tampons? Very mild menstrual cramp feeling    Given birth? No   Are you sexually active?No  Have you ever been worried for your physical safety? Not asked   Any abdominal or pelvic surgeries? Hysterectomy (uterus only~25 years ago due to fibroids),  laparascopy  Are you having any regular exercise?walking 3/4 to 1 mile, core and stretching,   Have you practiced the PF(kegel) exercises for 4 or more weeks?no      The history is provided by the patient.   Patient Health History           General health as reported by patient is excellent.  Pertinent medical history includes: cancer, incontinence and numbness/tingling. Other medical history details: neuropathy/balance, SVT arrythmia.     Medical allergies: see EPI.   Surgeries include:  Orthopedic surgery and cancer surgery (CA surgery upcoming June 9, 2022). Other surgery history details: 2003.                                               Objective:  System                                 Pelvic Dysfunction Evaluation:    Bladder/Pelvic Problems:    Storage Problem:  Stress incontinence  Emptying Problem:  Incomplete emptying and strain to void      Diagnostic Tests:    Pelvic Exam:  Yes                      Flexibility:    Tightness present at:Adductors and Iliopsoas    Abdominal Wall:  Abdominal wall pelvic: mild TTP over B hip flexor (iliopsoas)        Pelvic Clock Exam:  NA              Reflex Testing:  NA    External Assessment:  External assessment pelvic: good anal wink, no UGT descent.  Skin Condition:  Atrophic    Bearing Down/Coughing:  Normal  Tissue Symmetry:  Normal    Muscle Contraction/Perineal Mobility:  Slight lift, no urogential triangle descent  Internal Assessment:  Internal assessment pelvic: patient deferred, wanted to do external observation only.              SEMG Biofeedback:  Semg biofeedback pelvic:  possibly next.                  Additional History:    Number of Pregnancies: 0    Caffeine Consumption:  1.5 cups black tea                     General     ROS    Assessment/Plan:    Patient is a 71 year old female with pelvic complaints.    Patient has the following significant findings with corresponding treatment plan.                Diagnosis 1:  Pelvic floor dysfunction/stress incontinence  Decreased ROM/flexibility - manual therapy and therapeutic exercise  Decreased strength - therapeutic exercise and therapeutic activities  Decreased proprioception - neuro re-education and therapeutic activities  Impaired gait - gait training  Impaired muscle performance - biofeedback and neuro re-education  Decreased function - therapeutic activities    Therapy Evaluation Codes:   1) History comprised of:   Personal factors that impact the plan of care:      None.    Comorbidity factors that impact the plan of care are:      Cancer.     Medications impacting care: Cardiac, just finished chemo.  2) Examination of Body Systems comprised of:   Body structures and functions that impact the plan of care:      Pelvis.   Activity limitations that impact the plan of care are:      Stress incontinence.  3) Clinical presentation characteristics are:   Evolving/Changing.  4) Decision-Making    Moderate complexity using standardized patient assessment instrument and/or measureable assessment of functional outcome.  Cumulative Therapy Evaluation is: Moderate complexity.    Previous and current functional limitations:  (See Goal Flow Sheet for this information)    Short term and Long term goals: (See Goal Flow Sheet for this information)     Communication ability:  Patient appears to be able to clearly communicate and understand verbal and written communication and follow directions correctly.  Treatment Explanation - The following has been discussed with the patient:   RX ordered/plan of care  Anticipated outcomes  Possible risks and  side effects  This patient would benefit from PT intervention to resume normal activities.   Rehab potential is good.    Frequency:  1 X a month, once daily  Duration:  for 3 months  Discharge Plan:  Achieve all LTG.  Independent in home treatment program.  Reach maximal therapeutic benefit.    Please refer to the daily flowsheet for treatment today, total treatment time and time spent performing 1:1 timed codes.

## 2022-06-08 NOTE — PROGRESS NOTES
PTA medications updated by Medication Scribe prior to surgery via phone call with patient (last doses completed by Nurse)     Medication history sources: Patient, Surescripts, H&P and Patient's home med list  In the past week, patient estimated taking medication this percent of the time: Greater than 90%  Adherence assessment: N/A Not Observed    Significant changes made to the medication list:  None      Additional medication history information:   None    Medication reconciliation completed by provider prior to medication history? No    Time spent in this activity: 60 MINUTES    The information provided in this note is only as accurate as the sources available at the time of update(s)      Prior to Admission medications    Medication Sig Last Dose Taking? Auth Provider   alpha-d-galactosidase (BEANO) tablet Take 1 tablet by mouth daily as needed for intestinal gas  at PRN Yes Reported, Patient   B Complex-C (VITAMIN B COMPLEX W/VITAMIN C) TABS tablet Take 1 tablet by mouth four times a week  at AM Yes Reported, Patient   Carboxymethylcell-Hypromellose (GENTEAL) 0.25-0.3 % GEL Place 1 drop into both eyes every evening  at PM Yes Reported, Patient   carboxymethylcellulose PF (REFRESH PLUS) 0.5 % ophthalmic solution Place 1 drop into both eyes every evening  at PM Yes Reported, Patient   Carboxymethylcellulose Sodium (THERATEARS) 0.25 % SOLN Place 1 drop into both eyes 4 times daily as needed  at AM Yes Reported, Patient   clindamycin (CLEOCIN T) 1 % external solution Apply topically daily as needed (ACNE/CUTS)  at PRN Yes Reported, Patient   docusate sodium (COLACE) 100 MG capsule Take 100 mg by mouth daily as needed for constipation  at PRN Yes Reported, Patient   Flaxseed, Linseed, (FLAX SEEDS) POWD Take 1 Tablespoonful by mouth every morning  at AM Yes Reported, Patient   flecainide (TAMBOCOR) 50 MG tablet Take 100 mg ( 2 tabs) twice daily  at AM Yes José Miguel Martin MD   fluticasone (FLONASE) 50  MCG/ACT nasal spray Spray 1 spray into both nostrils daily  at AM Yes Reported, Patient   lactase (LACTAID) 3000 UNIT tablet Take 3,000 Units by mouth daily as needed for indigestion  at PRN Yes Reported, Patient   lidocaine-prilocaine (EMLA) 2.5-2.5 % external cream Apply topically as needed (1 HOUR PRIOR TO PORT DRAINS/INFUSION)  Yes Reported, Patient   methylcellulose (CITRUCEL) powder Take 1.5 teaspoonful by mouth every morning  at AM Yes Reported, Patient   multivitamin, therapeutic (THERA-VIT) TABS tablet Take 1 tablet by mouth three times a week   at AM Yes Unknown, Entered By History   Oat Bran Soluble POWD Take 1 Tablespoonful by mouth daily  at AM Yes Reported, Patient   OVER-THE-COUNTER Place 1 drop into both eyes daily as needed Medication Name: Hylo-Forte Oph Soln  at AM Yes Reported, Patient   polyethylene glycol (MIRALAX) 17 GM/Dose powder Take 1 capful by mouth daily  at AM Yes Reported, Patient   polyethylene glycol-propylene glycol (SYSTANE ULTRA) 0.4-0.3 % SOLN ophthalmic solution Place 1 drop into both eyes 4 times daily as needed for dry eyes  at AM Yes Reported, Patient   polyethylene glycol-propylene glycol PF (SYSTANE HYDRATION PF) 0.4-0.3 % SOLN opthalmic solution Place 1 drop into both eyes 4 times daily as needed for dry eyes  at AM Yes Reported, Patient   Probiotic Product (PROBIOTIC DAILY PO) Take 1 capsule by mouth daily  at PRN Yes Unknown, Entered By History   simethicone (MYLICON) 125 MG chewable tablet Take 125 mg by mouth daily as needed for intestinal gas  at PRN Yes Reported, Patient   Vitamin D (Cholecalciferol) 25 MCG (1000 UT) TABS Take 1 tablet by mouth four times a week  at AM Yes Reported, Patient

## 2022-06-08 NOTE — LETTER
6/8/2022    [facility]  [address] [address] [id number]    RE: Yaneli Ramey       Dear Colleague,     I had the pleasure of seeing Yaneli Ramey in the [facility].  PHYSICIAN NOTE:  This visit was completed in person at the [facility].      It was my pleasure seeing Ms. Yaneli Ramey, a very nice 72-year-old female, in follow-up of symptomatic nonsustained atrial arrhythmias.     Yaneli has the following medical issues:  A.  Symptomatic PACs and PVCs.  B.  Episodes of nonsustained SVT associated with lightheadedness, leading to hospitalization in 2017.  Treated with flecainide since then.  Excellent suppression of the arrhythmia on flecainide.  Dose increased in 12/2021 to current 100 mg twice daily.  C.  Dyslipidemia.  D.  Stage IIIC ovarian cancer.    Nasra has been feeling reasonably well.  She finished her course of radiation and she tells me she has no detectable cancer at this time.  She is scheduled for laparoscopic oophorectomy tomorrow.  Depending on the findings, this may be transitioned to an open laparotomy.      Here arrhythmia has significantly improved on the current higher dose of flecainide 100 mg twice daily.  Rare brief symptomatic events.    She had several questions about flecainide, interactions with medications and diet, consideration of catheter ablation at a later time, etc. Etc. incidental coronary calcification was seen on both her recent PET scan as well as a CT.  She wanted information about the significance of this.  Of note, she has no exertional or other chest pain to suggest angina.      PHYSICAL EXAMINATION:  General:  in no apparent distress, slender body habitus  ENT/Mouth:  no nasal discharge.  Eyes:  normal conjunctivae.  No jaundice.  Neck:  no thyromegaly or lymphadenopathy.  Chest/Lungs:  patient is not dyspneic.  Lungs CTA, without rales or wheezing.    Cardiovascular: Normal JVP, rate is  "regular.  No gallop, murmur or rub.      Extremities:  no edema  Skin:  no xanthelasma.  Neurologic:  alert & oriented x 3.  Normal arm motion bilateral, no tremors.    Vascular:  2+ carotids without bruits.  2+ radials.        DIAGNOSTIC STUDIES:  ECG:  Her tracing from April 2022 was normal except for mild atrial conduction abnormality.  QRS width was normal.      IMPRESSION:  1. Symptomatic atrial tachycardia.  Much improved on the current dose of flecainide.  Nasra asked whether the dose of the medication can be later tapered. This is certainly worthwhile, though I would wait at least 2-3 months after surgery before decreasing it to 75 mg twice daily for 2-3 weeks.  If she does well on this intermediate dose, then she can lower to her previous 50 mg twice daily.  In the past, her arrhythmia was nonsustained, a challenging target for ablation.  I would not recommend EP study, unless the AT's clinical behavior changes in the future.  2. Coronary artery calcification.  Described as \"mild\" on her recent chest CT.  No angina.  Had a negative stress test in 2017.  I have reassured her.  We may consider stress test next year, provided she is doing well with her cancer.    RECOMMENDATIONS:  1. Consider tapering flecainide in a few months, see above  2. I will see her in follow-up in 1 year with twelve-lead ECG.    It was my pleasure seeing this delightful patient.  Total time spent, including time for chart review and documentation, was 30 minutes.    José Miguel Martin MD, [facility]          Orders Placed This Encounter   Procedures     Follow-Up with Cardiology EP     EKG 12-lead complete w/read - Clinics (to be scheduled)       Orders Placed This Encounter   Medications     B Complex-C (VITAMIN B COMPLEX W/VITAMIN C) TABS tablet     Sig: Take 1 tablet by mouth daily     Lidocaine 2 % GEL     traMADol (ULTRAM) 50 MG tablet     Sig: Take 50 mg by mouth every 6 hours as needed for severe pain     loperamide (IMODIUM) 2 MG " capsule     Sig: Take 2 mg by mouth 4 times daily as needed for diarrhea     Fexofenadine HCl (ALLEGRA PO)     lidocaine-prilocaine (EMLA) 2.5-2.5 % external cream     Sig: lidocaine-prilocaine 2.5 %-2.5 % topical cream   PLEASE SEE ATTACHED FOR DETAILED DIRECTIONS     LORazepam (ATIVAN) 0.5 MG tablet     Sig: lorazepam 0.5 mg tablet   TAKE 1 TABLET ORALLY 1 TO 2 TIMES PER DAY AS NEEDED FOR ANXIETY.       There are no discontinued medications.      Encounter Diagnosis   Name Primary?     Atrial tachycardia (H) Yes       CURRENT MEDICATIONS:  Current Outpatient Medications   Medication Sig Dispense Refill     Acetaminophen 325 MG CAPS Take 325-650 mg by mouth every 4 hours as needed       B Complex-C (VITAMIN B COMPLEX W/VITAMIN C) TABS tablet Take 1 tablet by mouth daily       Carboxymethylcellulose Sodium (REFRESH LIQUIGEL OP)        diphenhydrAMINE (BENADRYL) 12.5 MG/5ML solution Take by mouth 4 times daily as needed for allergies or sleep For Chemo       Fexofenadine HCl (ALLEGRA PO)        Flaxseed, Linseed, (FLAX SEEDS) POWD Take 1 Tablespoonful by mouth every morning       flecainide (TAMBOCOR) 50 MG tablet Take 100 mg ( 2 tabs) twice daily 360 tablet 3     fluticasone (FLONASE) 50 MCG/ACT nasal spray Spray 1 spray into both nostrils daily       ibuprofen (ADVIL/MOTRIN) 200 MG capsule Take 200 mg by mouth every 4 hours as needed for fever       Lidocaine 2 % GEL        lidocaine-prilocaine (EMLA) 2.5-2.5 % external cream lidocaine-prilocaine 2.5 %-2.5 % topical cream   PLEASE SEE ATTACHED FOR DETAILED DIRECTIONS       loperamide (IMODIUM) 2 MG capsule Take 2 mg by mouth 4 times daily as needed for diarrhea       LORazepam (ATIVAN) 0.5 MG tablet lorazepam 0.5 mg tablet   TAKE 1 TABLET ORALLY 1 TO 2 TIMES PER DAY AS NEEDED FOR ANXIETY.       methylcellulose (CITRUCEL) powder Take 0.5 teaspoonful by mouth every morning       methylPREDNISolone sodium succinate 124.2 mg/kg Inject 124.2 mg/kg into the vein  continuous For chemo       multivitamin, therapeutic (THERA-VIT) TABS tablet Take 1 tablet by mouth three times a week        polyethylene glycol (MIRALAX) 17 g packet Take 1 packet by mouth daily       Probiotic Product (PROBIOTIC DAILY PO) Take 1 capsule by mouth three times a week Takes one capsule Sunday, Tuesday, Thursday       SENNA-docusate sodium (SENNA S) 8.6-50 MG tablet Take 1-2 tablets by mouth 2 times daily Take while taking dilaudid 20 tablet 0     traMADol (ULTRAM) 50 MG tablet Take 50 mg by mouth every 6 hours as needed for severe pain       Vitamin D (Cholecalciferol) 50 MCG (2000 UT) CAPS Take 1 capsule by mouth daily          ALLERGIES     Allergies   Allergen Reactions     Adhesive Tape      Cromolyn      Estradiol      Hydrocodone Other (See Comments)     Other Drug Allergy (See Comments)      Percodan       Paclitaxel      Percocet [Oxycodone-Acetaminophen]      Septra [Sulfamethoxazole W-Trimethoprim]        PAST MEDICAL HISTORY:  Past Medical History:   Diagnosis Date     Arthritis      Asthma      Asthma      Complication of anesthesia      Gastro-oesophageal reflux disease      Hemorrhoid      Insomnia      Nasal polyp      Neoplasm of fallopian tube      Nuclear sclerosis      Paresthesias      Paroxysmal atrial fibrillation (H)      Paroxysmal supraventricular tachycardia (H)      Pneumonia      Posterior vitreous detachment      SVT (supraventricular tachycardia) (H)        PAST SURGICAL HISTORY:  Past Surgical History:   Procedure Laterality Date     BREAST SURGERY      breast biopsy     BUNIONECTOMY       DISCECTOMY LUMBAR MINIMALLY INVASIVE TWO LEVELS       ENDOSCOPIC SINUS SURGERY       HYSTERECTOMY       LAPAROSCOPY DIAGNOSTIC (GYN) N/A 12/9/2021    Procedure: DIAGNOSTIC LAPAROSCOPY;  Surgeon: Cheryl Martinez MD;  Location: [facility] OR     REPAIR HAMMER TOE       REPAIR LIGAMENT ULNAR COLLATERAL (ELBOW)       REPAIR PTOSIS BILATERAL  12/23/2013    Procedure: REPAIR PTOSIS BILATERAL;  " right upper lid ptosis repair and bilateral upper eyelid mechanical ptosis repair;  Surgeon: Santo Choudhury MD;  Location: [facility]       FAMILY HISTORY:  Family History   Problem Relation Age of Onset     Emphysema Father      Coronary Artery Disease Early Onset Father      Prostate Cancer Father      Lupus Mother        SOCIAL HISTORY:  Social History     Socioeconomic History     Marital status: Single     Spouse name: None     Number of children: None     Years of education: None     Highest education level: None   Tobacco Use     Smoking status: Former Smoker     Smokeless tobacco: Never Used     Tobacco comment: quit 1992   Vaping Use     Vaping Use: Never used   Substance and Sexual Activity     Alcohol use: Yes     Alcohol/week: 0.0 standard drinks     Comment: min.     Drug use: No   Other Topics Concern     Special Diet No     Exercise No       Review of Systems:  Skin:          Eyes:         ENT:         Respiratory:          Cardiovascular:         Gastroenterology:        Genitourinary:         Musculoskeletal:         Neurologic:         Psychiatric:         Heme/Lymph/Imm:         Endocrine:           Physical Exam:  Vitals: /60 (BP Location: Right arm, Patient Position: Sitting, Cuff Size: Adult Regular)   Pulse 69   Ht 1.778 m (5' 10\")   LMP  (LMP Unknown)   SpO2 99%   BMI 20.63 kg/m      Constitutional:           Skin:             Head:           Eyes:           Lymph:      ENT:           Neck:           Respiratory:            Cardiac:                                                           GI:           Extremities and Muscular Skeletal:                 Neurological:           Psych:           CC  Referred Self,    Thank you for allowing me to participate in the care of your patient.      Sincerely,     José Miguel Martin MD     [facility]  "

## 2022-06-08 NOTE — PROGRESS NOTES
PHYSICIAN NOTE:  This visit was completed in person at the [facility].      It was my pleasure seeing Ms. Yaneli Ramey, a very nice 72-year-old female, in follow-up of symptomatic nonsustained atrial arrhythmias.     Yaneli has the following medical issues:  A.  Symptomatic PACs and PVCs.  B.  Episodes of nonsustained SVT associated with lightheadedness, leading to hospitalization in 2017.  Treated with flecainide since then.  Excellent suppression of the arrhythmia on flecainide.  Dose increased in 12/2021 to current 100 mg twice daily.  C.  Dyslipidemia.  D.  Stage IIIC ovarian cancer.    Nasra has been feeling reasonably well.  She finished her course of radiation and she tells me she has no detectable cancer at this time.  She is scheduled for laparoscopic oophorectomy tomorrow.  Depending on the findings, this may be transitioned to an open laparotomy.      Here arrhythmia has significantly improved on the current higher dose of flecainide 100 mg twice daily.  Rare brief symptomatic events.    She had several questions about flecainide, interactions with medications and diet, consideration of catheter ablation at a later time, etc. Etc. incidental coronary calcification was seen on both her recent PET scan as well as a CT.  She wanted information about the significance of this.  Of note, she has no exertional or other chest pain to suggest angina.      PHYSICAL EXAMINATION:  General:  in no apparent distress, slender body habitus  ENT/Mouth:  no nasal discharge.  Eyes:  normal conjunctivae.  No jaundice.  Neck:  no thyromegaly or lymphadenopathy.  Chest/Lungs:  patient is not dyspneic.  Lungs CTA, without rales or wheezing.    Cardiovascular: Normal JVP, rate is regular.  No gallop, murmur or rub.      Extremities:  no edema  Skin:  no xanthelasma.  Neurologic:  alert & oriented x 3.  Normal arm motion bilateral, no tremors.    Vascular:  2+ carotids without bruits.  2+ radials.   "      DIAGNOSTIC STUDIES:  ECG:  Her tracing from April 2022 was normal except for mild atrial conduction abnormality.  QRS width was normal.      IMPRESSION:  1. Symptomatic atrial tachycardia.  Much improved on the current dose of flecainide.  Nasra asked whether the dose of the medication can be later tapered. This is certainly worthwhile, though I would wait at least 2-3 months after surgery before decreasing it to 75 mg twice daily for 2-3 weeks.  If she does well on this intermediate dose, then she can lower to her previous 50 mg twice daily.  In the past, her arrhythmia was nonsustained, a challenging target for ablation.  I would not recommend EP study, unless the AT's clinical behavior changes in the future.  2. Coronary artery calcification.  Described as \"mild\" on her recent chest CT.  No angina.  Had a negative stress test in 2017.  I have reassured her.  We may consider stress test next year, provided she is doing well with her cancer.    RECOMMENDATIONS:  1. Consider tapering flecainide in a few months, see above  2. I will see her in follow-up in 1 year with twelve-lead ECG.    It was my pleasure seeing this delightful patient.  Total time spent, including time for chart review and documentation, was 30 minutes.    José Miguel Martin MD, [facility]          Orders Placed This Encounter   Procedures     Follow-Up with Cardiology EP     EKG 12-lead complete w/read - Clinics (to be scheduled)       Orders Placed This Encounter   Medications     B Complex-C (VITAMIN B COMPLEX W/VITAMIN C) TABS tablet     Sig: Take 1 tablet by mouth daily     Lidocaine 2 % GEL     traMADol (ULTRAM) 50 MG tablet     Sig: Take 50 mg by mouth every 6 hours as needed for severe pain     loperamide (IMODIUM) 2 MG capsule     Sig: Take 2 mg by mouth 4 times daily as needed for diarrhea     Fexofenadine HCl (ALLEGRA PO)     lidocaine-prilocaine (EMLA) 2.5-2.5 % external cream     Sig: lidocaine-prilocaine 2.5 %-2.5 % topical cream   " PLEASE SEE ATTACHED FOR DETAILED DIRECTIONS     LORazepam (ATIVAN) 0.5 MG tablet     Sig: lorazepam 0.5 mg tablet   TAKE 1 TABLET ORALLY 1 TO 2 TIMES PER DAY AS NEEDED FOR ANXIETY.       There are no discontinued medications.      Encounter Diagnosis   Name Primary?     Atrial tachycardia (H) Yes       CURRENT MEDICATIONS:  Current Outpatient Medications   Medication Sig Dispense Refill     Acetaminophen 325 MG CAPS Take 325-650 mg by mouth every 4 hours as needed       B Complex-C (VITAMIN B COMPLEX W/VITAMIN C) TABS tablet Take 1 tablet by mouth daily       Carboxymethylcellulose Sodium (REFRESH LIQUIGEL OP)        diphenhydrAMINE (BENADRYL) 12.5 MG/5ML solution Take by mouth 4 times daily as needed for allergies or sleep For Chemo       Fexofenadine HCl (ALLEGRA PO)        Flaxseed, Linseed, (FLAX SEEDS) POWD Take 1 Tablespoonful by mouth every morning       flecainide (TAMBOCOR) 50 MG tablet Take 100 mg ( 2 tabs) twice daily 360 tablet 3     fluticasone (FLONASE) 50 MCG/ACT nasal spray Spray 1 spray into both nostrils daily       ibuprofen (ADVIL/MOTRIN) 200 MG capsule Take 200 mg by mouth every 4 hours as needed for fever       Lidocaine 2 % GEL        lidocaine-prilocaine (EMLA) 2.5-2.5 % external cream lidocaine-prilocaine 2.5 %-2.5 % topical cream   PLEASE SEE ATTACHED FOR DETAILED DIRECTIONS       loperamide (IMODIUM) 2 MG capsule Take 2 mg by mouth 4 times daily as needed for diarrhea       LORazepam (ATIVAN) 0.5 MG tablet lorazepam 0.5 mg tablet   TAKE 1 TABLET ORALLY 1 TO 2 TIMES PER DAY AS NEEDED FOR ANXIETY.       methylcellulose (CITRUCEL) powder Take 0.5 teaspoonful by mouth every morning       methylPREDNISolone sodium succinate 124.2 mg/kg Inject 124.2 mg/kg into the vein continuous For chemo       multivitamin, therapeutic (THERA-VIT) TABS tablet Take 1 tablet by mouth three times a week        polyethylene glycol (MIRALAX) 17 g packet Take 1 packet by mouth daily       Probiotic Product  (PROBIOTIC DAILY PO) Take 1 capsule by mouth three times a week Takes one capsule Sunday, Tuesday, Thursday       SENNA-docusate sodium (SENNA S) 8.6-50 MG tablet Take 1-2 tablets by mouth 2 times daily Take while taking dilaudid 20 tablet 0     traMADol (ULTRAM) 50 MG tablet Take 50 mg by mouth every 6 hours as needed for severe pain       Vitamin D (Cholecalciferol) 50 MCG (2000 UT) CAPS Take 1 capsule by mouth daily          ALLERGIES     Allergies   Allergen Reactions     Adhesive Tape      Cromolyn      Estradiol      Hydrocodone Other (See Comments)     Other Drug Allergy (See Comments)      Percodan       Paclitaxel      Percocet [Oxycodone-Acetaminophen]      Septra [Sulfamethoxazole W-Trimethoprim]        PAST MEDICAL HISTORY:  Past Medical History:   Diagnosis Date     Arthritis      Asthma      Asthma      Complication of anesthesia      Gastro-oesophageal reflux disease      Hemorrhoid      Insomnia      Nasal polyp      Neoplasm of fallopian tube      Nuclear sclerosis      Paresthesias      Paroxysmal atrial fibrillation (H)      Paroxysmal supraventricular tachycardia (H)      Pneumonia      Posterior vitreous detachment      SVT (supraventricular tachycardia) (H)        PAST SURGICAL HISTORY:  Past Surgical History:   Procedure Laterality Date     BREAST SURGERY      breast biopsy     BUNIONECTOMY       DISCECTOMY LUMBAR MINIMALLY INVASIVE TWO LEVELS       ENDOSCOPIC SINUS SURGERY       HYSTERECTOMY       LAPAROSCOPY DIAGNOSTIC (GYN) N/A 12/9/2021    Procedure: DIAGNOSTIC LAPAROSCOPY;  Surgeon: Cheryl Martinez MD;  Location:  OR     REPAIR HAMMER TOE       REPAIR LIGAMENT ULNAR COLLATERAL (ELBOW)       REPAIR PTOSIS BILATERAL  12/23/2013    Procedure: REPAIR PTOSIS BILATERAL;  right upper lid ptosis repair and bilateral upper eyelid mechanical ptosis repair;  Surgeon: Santo Choudhury MD;  [facility]       FAMILY HISTORY:  Family History   Problem Relation Age of Onset     Emphysema  "Father      Coronary Artery Disease Early Onset Father      Prostate Cancer Father      Lupus Mother        SOCIAL HISTORY:  Social History     Socioeconomic History     Marital status: Single     Spouse name: None     Number of children: None     Years of education: None     Highest education level: None   Tobacco Use     Smoking status: Former Smoker     Smokeless tobacco: Never Used     Tobacco comment: quit 1992   Vaping Use     Vaping Use: Never used   Substance and Sexual Activity     Alcohol use: Yes     Alcohol/week: 0.0 standard drinks     Comment: min.     Drug use: No   Other Topics Concern     Special Diet No     Exercise No       Review of Systems:  Skin:          Eyes:         ENT:         Respiratory:          Cardiovascular:         Gastroenterology:        Genitourinary:         Musculoskeletal:         Neurologic:         Psychiatric:         Heme/Lymph/Imm:         Endocrine:           Physical Exam:  Vitals: /60 (BP Location: Right arm, Patient Position: Sitting, Cuff Size: Adult Regular)   Pulse 69   Ht 1.778 m (5' 10\")   LMP  (LMP Unknown)   SpO2 99%   BMI 20.63 kg/m      Constitutional:           Skin:             Head:           Eyes:           Lymph:      ENT:           Neck:           Respiratory:            Cardiac:                                                           GI:           Extremities and Muscular Skeletal:                 Neurological:           Psych:           CC  Referred Self, MD  No address on file              "

## 2022-06-09 PROBLEM — C57.00 FALLOPIAN TUBE CANCER, CARCINOMA (H): Status: ACTIVE | Noted: 2022-01-01

## 2022-06-09 NOTE — ANESTHESIA PROCEDURE NOTES
Airway       Patient location during procedure: OR       Procedure Start/Stop Times: 6/9/2022 5:11 PM  Staff -        Anesthesiologist:  Asa Tolentino MD       CRNA: Gricelda Teran APRN CRNA       Performed By: CRNA  Consent for Airway        Urgency: elective  Indications and Patient Condition       Indications for airway management: beata-procedural       Induction type:intravenous       Mask difficulty assessment: 2 - vent by mask + OA or adjuvant +/- NMBA    Final Airway Details       Final airway type: endotracheal airway       Successful airway: ETT - single and Oral  Endotracheal Airway Details        ETT size (mm): 7.0       Cuffed: yes       Successful intubation technique: direct laryngoscopy       DL Blade Type: Gaviria 2       Grade View of Cords: 1       Adjucts: stylet       Position: Right       Measured from: gums/teeth       Secured at (cm): 22       Bite block used: None    Post intubation assessment        Placement verified by: capnometry, equal breath sounds and chest rise        Number of attempts at approach: 1       Number of other approaches attempted: 0       Secured with: pink tape       Ease of procedure: easy       Dentition: Intact and Unchanged    Medication(s) Administered   Medication Administration Time: 6/9/2022 5:11 PM

## 2022-06-09 NOTE — ANESTHESIA PREPROCEDURE EVALUATION
Anesthesia Pre-Procedure Evaluation    Patient: Yaneli Ramey   MRN: [id number] : 1950        Procedure : Procedure(s):  BILATERAL SALPINGO OOPHORECTOMY ; OMENTECTOMY ; POSSIBLE PELVIC AND PARA AORTIC LYMPHADENECTOMY ; POSSIBLE TUMOR DEBULKING  POSSIBLE BOWEL RESECTION ; POSSIBLE LAPAROTOMY          Past Medical History:   Diagnosis Date     Arthritis      Asthma      Asthma      Complication of anesthesia      Gastro-oesophageal reflux disease      Hemorrhoid      Insomnia      Nasal polyp      Neoplasm of fallopian tube      Nuclear sclerosis      Paresthesias      Paroxysmal atrial fibrillation (H)      Paroxysmal supraventricular tachycardia (H)      Pneumonia      Posterior vitreous detachment      SVT (supraventricular tachycardia) (H)       Past Surgical History:   Procedure Laterality Date     BREAST SURGERY      breast biopsy     BUNIONECTOMY       DISCECTOMY LUMBAR MINIMALLY INVASIVE TWO LEVELS       ENDOSCOPIC SINUS SURGERY       HYSTERECTOMY       LAPAROSCOPY DIAGNOSTIC (GYN) N/A 2021    Procedure: DIAGNOSTIC LAPAROSCOPY;  Surgeon: Cheryl Martinez MD;  Location:  OR     REPAIR HAMMER TOE       REPAIR LIGAMENT ULNAR COLLATERAL (ELBOW)       REPAIR PTOSIS BILATERAL  2013    Procedure: REPAIR PTOSIS BILATERAL;  right upper lid ptosis repair and bilateral upper eyelid mechanical ptosis repair;  Surgeon: Santo Choudhury MD;  Location:  [facility]      Allergies   Allergen Reactions     Adhesive Tape      Cromolyn      Estradiol      Hydrocodone Other (See Comments)     Other Drug Allergy (See Comments)      Percodan       Paclitaxel      Percocet [Oxycodone-Acetaminophen]      Septra [Sulfamethoxazole W-Trimethoprim]       Social History     Tobacco Use     Smoking status: Former Smoker     Smokeless tobacco: Never Used     Tobacco comment: quit    Substance Use Topics     Alcohol use: Yes     Alcohol/week: 0.0 standard drinks     Comment: min.      Wt Readings from Last 1  Encounters:   06/09/22 64 kg (141 lb 1.6 oz)        Anesthesia Evaluation   Pt has had prior anesthetic.     No history of anesthetic complications       ROS/MED HX  ENT/Pulmonary:    (-) sleep apnea   Neurologic:    (-) no CVA   Cardiovascular: Comment: Parox SVT    (+) -----dysrhythmias, Other,  (-) CAD   METS/Exercise Tolerance:     Hematologic:       Musculoskeletal:       GI/Hepatic:     (+) GERD,     Renal/Genitourinary:       Endo:    (-) Type II DM   Psychiatric/Substance Use:       Infectious Disease:       Malignancy:       Other:            Physical Exam    Airway        Mallampati: I   TM distance: > 3 FB   Neck ROM: full   Mouth opening: > 3 cm    Respiratory Devices and Support         Dental  no notable dental history         Cardiovascular   cardiovascular exam normal          Pulmonary   pulmonary exam normal                OUTSIDE LABS:  CBC:   Lab Results   Component Value Date    WBC 5.9 04/17/2021    WBC 6.6 04/15/2021    HGB 14.1 04/17/2021    HGB 15.3 04/15/2021    HCT 43.3 04/17/2021    HCT 47.7 (H) 04/15/2021     04/17/2021     04/15/2021     BMP:   Lab Results   Component Value Date     04/17/2021     04/15/2021    POTASSIUM 4.4 04/17/2021    POTASSIUM 3.2 (L) 04/15/2021    CHLORIDE 110 (H) 04/17/2021    CHLORIDE 106 04/15/2021    CO2 31 04/17/2021    CO2 27 04/15/2021    BUN 14 04/17/2021    BUN 14 04/15/2021    CR 0.79 04/17/2021    CR 0.90 04/15/2021    GLC 94 04/17/2021     (H) 04/15/2021     COAGS: No results found for: PTT, INR, FIBR  POC: No results found for: BGM, HCG, HCGS  HEPATIC: No results found for: ALBUMIN, PROTTOTAL, ALT, AST, GGT, ALKPHOS, BILITOTAL, BILIDIRECT, FELA  OTHER:   Lab Results   Component Value Date    A1C 5.9 01/05/2017    HOLLY 9.0 04/17/2021    MAG 2.1 01/05/2017    TSH 1.87 04/15/2021       Anesthesia Plan    ASA Status:  1   NPO Status:  NPO Appropriate    Anesthesia Type: General.     - Airway: ETT   Induction: Propofol,  Intravenous.   Maintenance: Balanced.        Consents    Anesthesia Plan(s) and associated risks, benefits, and realistic alternatives discussed. Questions answered and patient/representative(s) expressed understanding.    - Discussed:     - Discussed with:  Patient         Postoperative Care    Pain management: Multi-modal analgesia.   PONV prophylaxis: Ondansetron (or other 5HT-3), Background Propofol Infusion     Comments:                Marianela Clement MD, MD

## 2022-06-10 NOTE — PLAN OF CARE
[id number]. A&Ox4, VSS ex hypotension, RRT called. On RA. Patient pain is cx with scheduled tylenol. Patient is up with Ax1 and gaitbelt. Patient is voiding in the BR. Abd lap sites are CDI with steri strips. On tele, SR. Has had clear liquids during night, tolerated well. Continue to monitor.

## 2022-06-10 NOTE — OP NOTE
Procedure Date: 06/09/2022    PREOPERATIVE DIAGNOSIS:  High-grade serous carcinoma of the peritoneum, status post 6 cycles of neoadjuvant chemotherapy with carboplatin and Taxol.    POSTOPERATIVE DIAGNOSIS:  High-grade serous carcinoma of the peritoneum, status post 6 cycles of neoadjuvant chemotherapy with carboplatin and Taxol.  With bilateral ovarian masses.    SURGEON:  Cheryl Martinez MD    ASSISTANT:  Bhavana Box PA-C    ANESTHESIA:  General endotracheal anesthesia.    PROCEDURE:  Robotic-assisted total laparoscopic assisted bilateral salpingo-oophorectomy, bilateral ureterolysis, multiple peritoneal biopsies, infracolic omentectomy, lysis of adhesions and washings.    INDICATIONS FOR PROCEDURE:  The patient is a 72-year-old female, who, on 11/20/2021, noted fullness and dyspepsia when going for a prep for a virtual colonography.  She noted left-sided discomfort with insufflation, which she had not had a prior examination.  She was placed on omeprazole for 6 weeks and underwent GI consultation 04/26/2021.  By 11/21/2021, she had developed periumbilical discomfort as well as excessive gas production.  CT scan of the abdomen and pelvis on 11/02/2021 revealed moderate infiltrative nodularity throughout the omentum in the left abdomen, concerning for carcinomatosis.  The nodularity abuts the anterior aspect of the bladder.  There was a small focus of incomplete distention and thickening of the sigmoid colon in the deep right pelvis.  CA-125 was elevated on 11/05/2021 at 410 units per mL.  On 11/08/2001, she underwent a CT-guided omental biopsy, and pathology revealed high-grade serous carcinoma.  She was seen in consultation in my office.  We elected to proceed with diagnostic laparoscopy on 12/09/2021, and she was found to have unresectable disease.  She underwent port placement on 12/14/2021 and then underwent 6 cycles of neoadjuvant chemotherapy between 12/17/2021 and completed that 04/19/2022.   Because of reactions to Taxol, the last 3 cycles were changed from Taxol to Abraxane.  She had normalization of her CA-125 by cycle 4.  PET-CT scan showed no evidence of disease when done 05/06/2022, and she had genetic counseling and was negative for any germline mutations.  We elected to proceed with robotic-assisted bilateral salpingo-oophorectomy, omentectomy and staging.    FINDINGS:  The patient was found to have a tremendous response to chemotherapy.  She had filmy adhesions above the liver, but no clearly visible disease.  Her omentum looked normal, although it was adherent to the left lower quadrant, where it had been a dense omental cake previously.  These were all filmy adhesions.  In the pelvis, she had adhesions of the sigmoid colon to the left pelvic sidewall over the left ovary, and on the right side, she had a cystic change of the right ovary as well.  Both ovaries were enlarged and looked like they had been treated.  Once we had lysed the adhesions of the sigmoid colon, we could see that there was treated disease in the cul-de-sac as well, and there was one area of nodularity on the vesicouterine peritoneum that I biopsied as well.    DESCRIPTION OF PROCEDURE:  The patient was taken to the operating room.  She received broad-spectrum antibiotic prophylaxis.  Knee-high sequential compression devices were placed on her lower extremities.  She was brought to the operating room and placed in a supine position on the operating room table on a pink pad.  General endotracheal anesthesia was administered in the usual fashion.  Once intubated, she was repositioned in low lithotomy position using well-padded Yellofin stirrups.  Her arms were padded and held at her side with draw sheets over her arms and hands.  A Carter was placed above her forehead.  A foam donut was placed over her face.  She was prepped and draped in the usual sterile fashion.  A timeout was conducted, and everyone agreed upon the  procedure.  I decided to make my incision sites 21 cm above the symphysis pubis in the midline given where her ribs were aligned.  I infiltrated these areas.  I made an incision supraumbilically and tried to insert the Veress needle here but got large opening pressures.  I then went 7 cm to the right of that supraumbilical port, made another incision, and easily inserted the Veress needle into the peritoneal cavity with opening pressures of 2 mmHg.  The abdomen was insufflated with carbon dioxide to create a diffuse pneumoperitoneum.  Pressure limits were set and maintained at or below 15 mmHg throughout the case.  With establishment of the pneumoperitoneum, the Veress needle was exchanged for an 8 mm da Naman trocar, and the camera was introduced.  It was clear that we were preperitoneal.  On the first attempt, we had probably been in the falciform ligament.  She had some adhesions to the anterior abdominal wall, particularly in the left lower quadrant, where she had an omental cake previously, but these were all filmy.  I took some of these down with just blunt dissection.  I was able to insert the rest of the 3 upper abdominal trocars, 1 supraumbilically and 2 at 7 cm and 14 cm to the left of the supraumbilical port.  The camera was then moved to the left lateral port, and we used the LigaSure to take down some of the omental adhesions high up in the abdomen.  The remaining ones were in the left lower quadrant.  We placed the patient in 33 degrees of Trendelenburg with gravitational displacement of her bowel.  Bhavana Box inserted an 8 mm AirSeal port above the right anterior superior iliac spine, and we were able to dock the robot in the usual fashion.  Monopolar scissors were placed in the right upper robotic arm, a [place] bipolar was placed in the medial left upper robotic arm, and a ProGrasp was placed in the lateral left robotic arm.  These instruments were advanced in the pelvis.      The next half  hour we spent doing lysis of adhesions, taking down the omental adhesions to the anterior and lateral abdominal wall and off of the sigmoid colon.  We freed up the omentum along the infracolic portion with mainly the [place] bipolar and the monopolar scissors, but as we got over to the left-hand side, it got thicker, and I used a vessel sealer to free up this piece of omentum.  Once this infracolic omentum was freed up, it was left to find later.  We continued to do lysis of adhesions.  I lysed the colon off of a large mass on the left pelvic sidewall and off of the vaginal cuff, where there was a loculated area of fluid, probably a peritoneal inclusion cyst.  This looked like it had treated disease.  There was area of very miniscule nodularity on the vesicouterine peritoneum.  This peritoneum was biopsied and passed off to [place] and taken out through the accessory port site.  I did 3 separate biopsies of the cul-de-sac peritoneum, freeing it up from the surrounding tissues including the sigmoid colon.  These biopsies were sent off separately.  I now went to the right-hand side, and I cauterized and divided the residual round ligament.  I opened up the posterior broad ligament lateral to the ovarian vessels.  I isolated the ovarian vessels.  They were cauterized at the pelvic brim with the [place] bipolar and divided with the monopolar scissors.  I then dissected the cystic mass off of the surrounding tissues.  I needed to dissect the ureter off the inferior aspect of this mass so I could divide the peritoneum.  I did this effectively and then divided the mass free of the sigmoid epiploica, sigmoid colon, mesentery and freed it up completely from the peritoneum.  It did rupture in situ.  It was placed in an 8 mm anchor bag, which was cinched down, and the string was cut short.  We then went over to the left-hand side and did the exact same procedure, cauterized and divided the round ligament, opened up the  posterior broad ligament peritoneum, isolated the ovarian vessels.  The ovarian vessels were cauterized and divided.  We dissected out the ureter on this side as well prior to dividing the ovarian mass from the surrounding tissues, including the sigmoid, freed it up from the sigmoid colon and the bladder.  It too was placed in an EndoCatch bag, which was cinched down, and the string was cut a longer on this point.  The omentum was placed in the 8 mm EndoCatch bag.  Bhavana had us go off AirSeal and used a Kocher through the accessory port site to remove the 3 bags containing tissue.  The other biopsies had already been removed, and we, prior to removing the bags, had done washings of the pelvis, and this was collected and passed off as washings.  Once we were through with removing the bags, she replaced the AirSeal port, and once I was sure hemostasis was excellent, we placed Interceed over the areas of dissection to try to prevent that from happening again.  She closed the fascia of the right lower quadrant port with a fascial closure device and a 0 Vicryl suture.  The robotic instruments were removed.  The robot was undocked.  The pneumoperitoneum was allowed to dissipate, and the trocars were removed intact.  The incision sites were closed with 4-0 Monocryl in an interrupted fashion to reapproximate the subcutaneous tissue and 4-0 Monocryl in a subcuticular fashion to reapproximate the skin.  Benzoin was placed around the incision, Steri-Strips laid over the incisions.  Her anesthesia was reversed.  She was extubated and taken to recovery room in stable condition.  Estimated blood loss for the procedure was 25 mL.       Bhavana Box was my primary assist for the case.  She helped me with all aspects of the case, including trocar placement, docking the robot, placement of the robotic instruments.  She assisted through the accessory port site, providing necessary countertraction, optimizing visualization, and  suctioning and irrigating when necessary.  She was instrumental in specimen retrieval.  She helped with placement of the Interceed.  She helped with fascial closure of the right lower quadrant port and then undocking the robot and port site closure.      Cheryl Martinez MD        D: 2022   T: 06/10/2022   MT: [facility]    Name:     ROBERT POOLE  MRN:      [id number]        Account:        [id number]   :      1950           Procedure Date: 2022     Document: [id number]    cc:  MD Carlos Reyes MD John Boettger, PA-C

## 2022-06-10 NOTE — CODE/RAPID RESPONSE
"[facility]    House [facility] RRT Note  6/10/2022   Time Called: 0022    RRT called for: Near syncope    Assessment & Plan     Near syncope suspect multifactorial 2/2 orthostatic hypotension in setting of recent NPO status, vasovagal.  Alternatively considered acute blood loss anemia, perioperative MI  - Upon arrival, pt sitting upright in a wheelchair, awake, alert, in no apparent distress, with noted SBP 70s-80s, HR 80s.  Nursing notes pt had called for assistance to restroom.  Pt notes she was attempting to void, leaning forward and was only able to \"dribble a few drops\".  Pt notes at that time, she called for help as she began to feel dizzy and \"felt like I was going to go out\".  Pt notes she did not fully lose consciousness.  While sitting on toilet, pt's SBP 50s with slow trend up to the 70s-80s on the wheelchair.  Pt's initial SBP after lying back in bed (with assistance), was 90s with subsequent SBP 120s.  Pt reports no nausea, diaphoresis, chest pain, SOB.  Pt notes does not feel pressure in bladder to void.  Pt notes abdominal pain unchanged since surgery.      INTERVENTIONS:  - Stat CMP, CBC, lactic acid, trop, Mg  - Stat EKG  - Orthostatic VS noted while in wheelchair and lying in bed; did not obtain standing due to low BP while sitting upright  - Pt remains on telemetry monitoring and Q4H VS  - Fall precautions; up with assist  - Noted EBL 25 ml  - Bladder scan - 0 ml  - In setting of pt remains asymptomatic at this time, creatinine WNL, Hgb stable, lactic acid 1.8, pt drinking adequate PO intake at this time, will defer IVF bolus at this time.  If pt continues to remain orthostatic when OOB, may need to consider IVF bolus at that time.      At the end of the RRT pt remains hemodynamically stable, in no apparent distress, SBP 120s    Discussed with and defer further cares to nursing and primary gyn onc service    Interval History     Yaneli Ramey is a 72 year old female who was " admitted on 6/9/2022 for BILATERAL SALPINGO OOPHORECTOMY ; OMENTECTOMY ; MULTIPLE PERITONEAL BIOPSIES, BILATERAL URETEROLYSIS, WASHINGS, LYSIS OF ADHESIONS    Medical history significant for: Fallopian tube metastatic cancer, asthma, SVT, PAF    Code Status: Full Code    Allergies   Allergies   Allergen Reactions     Adhesive Tape      Cromolyn      Estradiol      Hydrocodone Other (See Comments)     Other Drug Allergy (See Comments)      Percodan       Paclitaxel      Percocet [Oxycodone-Acetaminophen]      Septra [Sulfamethoxazole W-Trimethoprim]        Physical Exam   Vital Signs with Ranges:  Temp:  [97.3  F (36.3  C)-99.4  F (37.4  C)] 98.1  F (36.7  C)  Pulse:  [67-86] 84  Resp:  [8-24] 24  BP: ()/(36-88) 77/57  SpO2:  [94 %-100 %] 98 %  I/O last 3 completed shifts:  In: 1700 [I.V.:1700]  Out: 25 [Blood:25]    Constitutional: Pt sitting upright in wheelchair, awake, alert, in no overt distress   Neck: No upper airway wheezes or stridor noted  Pulmonary: In no apparent respiratory distress, clear to auscultation, no crackles or wheezes noted  Cardiovascular: Regular rate and rhythm, normal S1S2, no murmur, rub or gallop noted  GI: Soft, nondistended, mildly tender to palpation, noted bilateral lap sites, CDI, no obvious ecchymoses, hematomas noted  Skin/Integumen: Warm, dry, pale, no mottling noted  Neuro: A/Ox4, clear speech, no focal neuro deficit noted  Psych:  Expressed frustration  Extremities: Moving all extremities     Data     EKG:  Interpreted by KIMBER Lund CNP  Time reviewed: 0103  Symptoms at time of EKG: None   Rhythm: 1 degree AV block  Rate: Normal  Axis: Normal  Ectopy: none  Conduction: 1st degree AV block  ST Segments/ T Waves: No ST-T wave changes and No acute ischemic changes  Q Waves: none  Comparison to prior: Unchanged from 4/21/22    Clinical Impression: no acute changes      Troponin:    Recent Labs   Lab Test 04/17/21  1056   TROPI <0.015       CBC with Diff:  Recent Labs    Lab Test 06/10/22  0043   WBC 8.8   HGB 12.4   MCV 96           Comprehensive Metabolic Panel:  Recent Labs   Lab 06/10/22  0043      POTASSIUM 4.2   CHLORIDE 109   CO2 22   ANIONGAP 9   *   BUN 19   CR 0.97   GFRESTIMATED 61   HLOLY 8.3*   MAG 2.3   PROTTOTAL 5.7*   ALBUMIN 3.2*   BILITOTAL 0.7   ALKPHOS 52   AST 20   ALT 22       Time Spent on this Encounter   I spent 25 minutes on the unit/floor managing the care of Yaneli CLINT Ramey. Over 50% of my time was spent counseling the patient and/or coordinating care regarding services listed in this note.    KIMBER Lund [facility]

## 2022-06-10 NOTE — CONSULTS
[facility]  Consult Note - Hospitalist Service  Date of Admission:  6/9/2022  Consult Requested by: gyn onc  Reason for Consult: hx SVT    Assessment & Plan   Yaneli Ramey is a 72 year old female with past medical history significant for ovarian cancer status post chemotherapy and history of supraventricular tachycardia who was admitted to observation by GYN surgery for her scheduled robot-assisted total lap hysterectomy with BSO, omentectomy with peritoneal biopsies, ureterolysis, washings, JOSE ANTONIO 6/9/22.  Hospitalist consulted today history of SVT; postoperative hypotension night of surgery.    Postoperative hypotension   Near syncope  Orthostatic hypotension  RRT called at of surgery for hypotension, see note. Patient endorses presyncopal symptoms with standing and ambulation.  No syncope.  Labs repeated hemoglobin 12 range baseline 13-14). EBL 25mL. Blood pressure at preop cardiology 2 days prior 108/60.  Telemetry unremarkable.  Some associated nausea, dry heaving, without emesis with attempting ambulation.  No obvious bleeding.  Pain controlled. BPs improving, suspect related to hypovolemia surrounding surgery leading to orthostatic hypotension (noted during RRT), n.p.o. status for surgery and vasovagal. Voiding. Taking PO but minimal.  - LR 500ml over 2 hours, x1  - Monitor BPs closely    ADDENDUM 1045  BPs improving 105/58. As long as feeling improved and BP remains stable, then patient is ok to discharge from Hospitalist perspective when cleared by GYN ONC.      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 Underwent 6 cycles of new adjuvant chemotherapy, completed April 2022. Surgery with Dr. Cheryl Martinez, found to have tremendous response to chemotherapy without visible disease.  EBL 25 mL.  No immediate  postoperative complications until she became hypotensive overnight, see above.  - Routine postop cares per primary service, gyn onc  - Disposition possibly later today pending BP trend    Hx nonsustained SVT  History of symptomatic PACs, PVCs and nonsustained SVT with associated lightheadedness leading to hospitalization 2017.  Has been maintained on flecainide since that time, recently increased 12/2021, follows with cardiology Dr. Martin, last visit 6/8/22. Not candidate for ablation due to paroxysmal nature of arrhythmia.   - Telemetry  - Continue PTA flecainide 100mg BID  - Continue to follow with EP cardiology    Chronic stable diagnoses and other pertinent medical history: Appropriate PTA medications will be resumed  Dyslipidemia   Mild coronary artery calcification, noted on CT scan  Allergic rhinitis  Insomnia  Osteoarthritis  Osteopenia  Posterior vitreous detachment, bilateral       The patient's care was discussed with the Attending Physician, Dr. Lin, Bedside Nurse, Patient and Primary team.    Leyda Garcia PA-C  [facility]  Securely message with the Vocera Web Console (learn more here)  Text page via [facility] Paging/Directory       Hospitalist Service    Clinically Significant Risk Factors Present on Admission             # Hypoalbuminemia: Albumin = 3.2 g/dL (Ref range: 3.4 - 5.0 g/dL) on admission, will monitor as appropriate          ______________________________________________________________________    Chief Complaint   Low blood pressure    History is obtained from the patient and electronic health record    History of Present Illness   Yaneli Ramey is a 72 year old female with past medical history significant for ovarian cancer status post chemotherapy and history of supraventricular tachycardia who was admitted to observation by GYN surgery for her scheduled robot-assisted total lap hysterectomy with BSO, omentectomy with peritoneal biopsies,  ureterolysis, washings, JOSE ANTONIO 6/9/22. EBL 25 mL.  GETA.  No immediate postoperative complications.  RRT called evening of surgery around midnight.  Hypotension, orthostasis.  Work-up negative including normal lactate, troponin, hemoglobin 12 range.  No SVT on telemetry.    During my visit the patient is feeling generally weak and fatigued.  She denies lightheadedness or dizziness laying in bed.  She notes that she was lightheaded, dizzy, and nauseous with dry heaving upon standing and ambulating earlier with low blood pressure, prompting RRT.  She is sipping water and had a light breakfast this morning.  She is no current nausea or vomiting.  Pain is controlled.  She is not currently lightheaded or dizzy.  No chest pain, palpitations.  No edema noted.  No visual changes.  Seen with Dr. Martinez at bedside, discussed IV fluid bolus and tentative plan to discharge later today pending blood pressure trend.      Review of Systems   The 10 point Review of Systems is negative other than noted in the HPI or here.   Past Medical History    I have reviewed this patient's medical history and updated it with pertinent information if needed.   Past Medical History:   Diagnosis Date     Arthritis      Asthma     Hx of, no inhalers at home for over 20 years     Complication of anesthesia      Gastro-oesophageal reflux disease      Hemorrhoid      Insomnia      Nasal polyp      Neoplasm of fallopian tube      Nuclear sclerosis      Paresthesias      Paroxysmal atrial fibrillation (H)      Paroxysmal supraventricular tachycardia (H)      Pneumonia      Posterior vitreous detachment     bilateral - left 2012, right 2020     SVT (supraventricular tachycardia) (H)        Past Surgical History   I have reviewed this patient's surgical history and updated it with pertinent information if needed.  Past Surgical History:   Procedure Laterality Date     BREAST SURGERY      breast biopsy     BUNIONECTOMY       DAVINCI LYSIS OF ADHESIONS N/A  2022    Procedure: Davinci Lysis of Adhesions;  Surgeon: Cheryl Martinez MD;  Location:  OR     DAVINCI SALPINGO-OOPHORECTOMY INCLUDING BILATERAL Bilateral 2022    Procedure: BILATERAL SALPINGO OOPHORECTOMY ; OMENTECTOMY ; MULTIPLE PERITONEAL BIOPSIES, BILATERAL URETEROLYSIS, WASHINGS, LYSIS OF ADHESIONS;  Surgeon: Cheryl Martinez MD;  Location:  OR     DISCECTOMY LUMBAR MINIMALLY INVASIVE TWO LEVELS       ENDOSCOPIC SINUS SURGERY       HYSTERECTOMY       LAPAROSCOPY DIAGNOSTIC (GYN) N/A 2021    Procedure: DIAGNOSTIC LAPAROSCOPY;  Surgeon: Cheryl Martinez MD;  Location: [facility] OR     REPAIR HAMMER TOE       REPAIR LIGAMENT ULNAR COLLATERAL (ELBOW)       REPAIR PTOSIS BILATERAL  2013    Procedure: REPAIR PTOSIS BILATERAL;  right upper lid ptosis repair and bilateral upper eyelid mechanical ptosis repair;  Surgeon: Santo Choudhury MD;  Location:  [facility]     URETEROLYSIS Bilateral 2022    Procedure: Ureterolysis;  Surgeon: Cheryl Martinez MD;  Location:  OR       Social History   I have reviewed this patient's social history and updated it with pertinent information if needed.  Social History     Tobacco Use     Smoking status: Former Smoker     Years: 20.00     Types: Cigarettes     Quit date:      Years since quittin.4     Smokeless tobacco: Never Used     Tobacco comment: quit    Vaping Use     Vaping Use: Never used   Substance Use Topics     Alcohol use: Not Currently     Comment: min.     Drug use: No     Comment: tried THC, now discontinued       Family History   I have reviewed this patient's family history and updated it with pertinent information if needed.  Family History   Problem Relation Age of Onset     Emphysema Father      Coronary Artery Disease Early Onset Father      Prostate Cancer Father      Lupus Mother        Medications   I have reviewed this patient's current medications  Medications Prior to Admission   Medication Sig Dispense  Refill Last Dose     alpha-d-galactosidase (BEANO) tablet Take 1 tablet by mouth daily as needed for intestinal gas   More than a month at PRN     B Complex-C (VITAMIN B COMPLEX W/VITAMIN C) TABS tablet Take 1 tablet by mouth four times a week   6/6/2022 at AM     Carboxymethylcell-Hypromellose (GENTEAL) 0.25-0.3 % GEL Place 1 drop into both eyes every evening   6/8/2022 at PM     carboxymethylcellulose PF (REFRESH PLUS) 0.5 % ophthalmic solution Place 1 drop into both eyes every evening   6/8/2022 at PM     Carboxymethylcellulose Sodium (THERATEARS) 0.25 % SOLN Place 1 drop into both eyes 4 times daily as needed   6/8/2022 at AM     clindamycin (CLEOCIN T) 1 % external solution Apply topically daily as needed (ACNE/CUTS)   6/9/2022 at PRN     docusate sodium (COLACE) 100 MG capsule Take 100 mg by mouth daily as needed for constipation   Past Month at PRN     Flaxseed, Linseed, (FLAX SEEDS) POWD Take 1 Tablespoonful by mouth every morning   6/1/2022 at AM     flecainide (TAMBOCOR) 50 MG tablet Take 100 mg ( 2 tabs) twice daily 360 tablet 3 6/9/2022 at AM     fluticasone (FLONASE) 50 MCG/ACT nasal spray Spray 1 spray into both nostrils daily   6/9/2022 at AM     lactase (LACTAID) 3000 UNIT tablet Take 3,000 Units by mouth daily as needed for indigestion   Past Month at PRN     lidocaine-prilocaine (EMLA) 2.5-2.5 % external cream Apply topically as needed (1 HOUR PRIOR TO PORT DRAINS/INFUSION)   5/27/2022     methylcellulose (CITRUCEL) powder Take 1.5 teaspoonful by mouth every morning   6/7/2022 at AM     multivitamin, therapeutic (THERA-VIT) TABS tablet Take 1 tablet by mouth three times a week    6/7/2022 at AM     Oat Bran Soluble POWD Take 1 Tablespoonful by mouth daily   6/1/2022 at AM     OVER-THE-COUNTER Place 1 drop into both eyes daily as needed Medication Name: Hylo-Forte Oph Soln   6/8/2022 at AM     polyethylene glycol (MIRALAX) 17 GM/Dose powder Take 1 capful by mouth daily   6/7/2022 at AM      polyethylene glycol-propylene glycol (SYSTANE ULTRA) 0.4-0.3 % SOLN ophthalmic solution Place 1 drop into both eyes 4 times daily as needed for dry eyes   6/9/2022 at AM     polyethylene glycol-propylene glycol PF (SYSTANE HYDRATION PF) 0.4-0.3 % SOLN opthalmic solution Place 1 drop into both eyes 4 times daily as needed for dry eyes   6/9/2022 at AM     Probiotic Product (PROBIOTIC DAILY PO) Take 1 capsule by mouth daily   6/7/2022 at PRN     simethicone (MYLICON) 125 MG chewable tablet Take 125 mg by mouth daily as needed for intestinal gas   Past Week at PRN     Vitamin D (Cholecalciferol) 25 MCG (1000 UT) TABS Take 1 tablet by mouth four times a week   6/6/2022 at AM       Allergies   Allergies   Allergen Reactions     Adhesive Tape      Cromolyn      Estradiol      Hydrocodone Other (See Comments)     Other Drug Allergy (See Comments)      Percodan       Paclitaxel      Percocet [Oxycodone-Acetaminophen]      Septra [Sulfamethoxazole W-Trimethoprim]        Physical Exam   Vital Signs: Temp: 98.5  F (36.9  C) Temp src: Oral BP: 105/58 Pulse: 77   Resp: 16 SpO2: 95 % O2 Device: None (Room air) Oxygen Delivery: 2 LPM  Weight: 141 lbs 1.6 oz    General: Awake, alert, very pleasant woman who appears younger than stated age. Looks comfortable laying in bed. No acute distress.  HEENT: Normocephalic, atraumatic. Extraocular movements intact.   Respiratory: Clear to auscultation bilaterally, no rales, wheezing, or rhonchi to anterolateral fields. No hypoxia. No increased work of breathing.  Cardiovascular: Regular rate and rhythm, +S1 and S2, no murmur auscultated. No peripheral edema.   Gastrointestinal: Soft, non-tender, non-distended. Bowel sounds present. Lap sites intact.  Skin: Warm, dry. No obvious rashes or lesions on exposed skin. Dorsalis pedis pulses palpable bilaterally.  Musculoskeletal: No joint swelling, erythema or tenderness. Moves all extremities equally.  Neurologic: AAO x3.   Psychiatric: Appropriate  mood and affect. No obvious anxiety or depression.        Data   Results for orders placed or performed during the hospital encounter of 06/09/22 (from the past 24 hour(s))   Comprehensive metabolic panel   Result Value Ref Range    Sodium 140 133 - 144 mmol/L    Potassium 4.2 3.4 - 5.3 mmol/L    Chloride 109 94 - 109 mmol/L    Carbon Dioxide (CO2) 22 20 - 32 mmol/L    Anion Gap 9 3 - 14 mmol/L    Urea Nitrogen 19 7 - 30 mg/dL    Creatinine 0.97 0.52 - 1.04 mg/dL    Calcium 8.3 (L) 8.5 - 10.1 mg/dL    Glucose 138 (H) 70 - 99 mg/dL    Alkaline Phosphatase 52 40 - 150 U/L    AST 20 0 - 45 U/L    ALT 22 0 - 50 U/L    Protein Total 5.7 (L) 6.8 - 8.8 g/dL    Albumin 3.2 (L) 3.4 - 5.0 g/dL    Bilirubin Total 0.7 0.2 - 1.3 mg/dL    GFR Estimate 61 >60 mL/min/1.73m2   Troponin I (STAT)   Result Value Ref Range    Troponin I High Sensitivity 18 <54 ng/L   Lactic acid whole blood   Result Value Ref Range    Lactic Acid 1.8 0.7 - 2.0 mmol/L   CBC with platelets   Result Value Ref Range    WBC Count 8.8 4.0 - 11.0 10e3/uL    RBC Count 3.91 3.80 - 5.20 10e6/uL    Hemoglobin 12.4 11.7 - 15.7 g/dL    Hematocrit 37.6 35.0 - 47.0 %    MCV 96 78 - 100 fL    MCH 31.7 26.5 - 33.0 pg    MCHC 33.0 31.5 - 36.5 g/dL    RDW 15.7 (H) 10.0 - 15.0 %    Platelet Count 182 150 - 450 10e3/uL   Magnesium   Result Value Ref Range    Magnesium 2.3 1.6 - 2.3 mg/dL   EKG 12-lead, tracing only   Result Value Ref Range    Systolic Blood Pressure  mmHg    Diastolic Blood Pressure  mmHg    Ventricular Rate 83 BPM    Atrial Rate 83 BPM    OK Interval 228 ms    QRS Duration 124 ms     ms    QTc 479 ms    P Axis 69 degrees    R AXIS 39 degrees    T Axis 43 degrees    Interpretation ECG       Sinus rhythm with 1st degree A-V block  Non-specific intra-ventricular conduction delay  Borderline ECG  When compared with ECG of 17-APR-2021 09:23,  OK interval has increased  Non-specific change in ST segment in Inferior leads  Nonspecific T wave abnormality now  evident in Inferior leads     Glucose by meter   Result Value Ref Range    GLUCOSE BY METER POCT 126 (H) 70 - 99 mg/dL

## 2022-06-10 NOTE — PROGRESS NOTES
Gyn Onc    Hypotensive overnight likely due to dehydration from bowel prep.  Hgb 12.  Pain controlled with tylenol.  Incision sites look good.  Getting IVF this AM.  Likely discharge this afternoon.  Discussed intra-op findings and procedure.    TEOFILO Martinez MD  [phone]

## 2022-06-10 NOTE — PLAN OF CARE
Goal Outcome Evaluation:  6/10/22, [id number]  POD 1. A&Ox4. CMS intact. Bowel sounds hypoactive, - flatus, tolerating reg  diet. VSS. Dressing CDI to abd lap sites. PIV  SL. Up with A GB to BR. C/o mild pain, decreased with tylenol. Pt scoring green on the Aggression Stop Light Tool. Plan : Education provided, understanding verbalized, AVS reviewed, and  Medications received by pt. Discharge now.

## 2022-06-10 NOTE — ANESTHESIA POSTPROCEDURE EVALUATION
Patient: Yaneli Ramey    Procedure: Procedure(s):  BILATERAL SALPINGO OOPHORECTOMY ; OMENTECTOMY ; MULTIPLE PERITONEAL BIOPSIES, BILATERAL URETEROLYSIS, WASHINGS, LYSIS OF ADHESIONS  Davinci Lysis of Adhesions  Ureterolysis       Anesthesia Type:  General    Note:  Disposition: Admission   Postop Pain Control: Uneventful            Sign Out: Well controlled pain   PONV: No   Neuro/Psych: Uneventful            Sign Out: Acceptable/Baseline neuro status   Airway/Respiratory: Uneventful            Sign Out: Acceptable/Baseline resp. status   CV/Hemodynamics: Uneventful            Sign Out: Acceptable CV status   Other NRE: NONE   DID A NON-ROUTINE EVENT OCCUR? No           Last vitals:  Vitals Value Taken Time   /88 06/09/22 2010   Temp 36.7  C (98  F) 06/09/22 1950   Pulse 68 06/09/22 2012   Resp 7 06/09/22 2012   SpO2 100 % 06/09/22 2012   Vitals shown include unvalidated device data.    Electronically Signed By: Roderick Hedrick MD  June 9, 2022  8:13 PM

## 2022-06-10 NOTE — PROGRESS NOTES
RRT called for patient systolic BP in the 50's. Patient was in the BR when started to feel dizzy and nauseous. Patient was due to void and only let out a few drops, bladder after scanned for 0 ml. Patient moved into wheelchair and intitial sys BP in the 50's and then bounced back into 70's-80's. STAT EKG and labs ordered. Patient back in bed and BP stabilize. No further orders at this time.

## 2022-06-10 NOTE — DISCHARGE SUMMARY
"HOSPITAL DISCHARGE SUMMARY    Patient Name: Yaneli Ramey  YOB: 1950 Age: 72 year old  Medical Record Number: [id number]  Primary Physician: Clinic, [facility]  Phone: [phone]  Admission Date: 6/9/2022  Discharge Date: 6/10/2022    Yaneli Ramey  will be discharged from [facility].    PRINCIPAL DISCHARGE DIAGNOSIS:  High-grade serous carcinoma of the peritoneum, status post 6 cycles of neoadjuvant chemotherapy with carboplatin and Taxol.    BRIEF HOSPITAL COURSE: This 72 year old female admitted following total hysterectomy and bilateral salpingo-oophorectomy with lymph node staging. She tolerated the procedure well. She had an episode of dizziness and hypotension that resolved with 500mL IV fluids. Otherwise, uneventful post operative course and discharge to home on POD #1 with adequate pain control, tolerating orals, voiding and ambulating.    PROCEDURES PERFORMED DURING HOSPITALIZATION:   BILATERAL SALPINGO OOPHORECTOMY ; OMENTECTOMY ; MULTIPLE PERITONEAL BIOPSIES, BILATERAL URETEROLYSIS, WASHINGS, LYSIS OF ADHESIONS  Davinci Lysis of Adhesions  Ureterolysis    COMPLICATIONS IN HOSPITAL: None    CONSULTATIONS:  Internal Medicine    PERTINENT FINDINGS/RESULTS AT DISCHARGE:   /64   Pulse 79   Temp 97.3  F (36.3  C) (Temporal)   Resp 19   Ht 1.778 m (5' 10\")   Wt 64 kg (141 lb 1.6 oz)   LMP  (LMP Unknown)   SpO2 100%   BMI 20.25 kg/m      Latest Laboratory Results:  Chem:  CBC RESULTS: Recent Labs   Lab Test 04/17/21  1056   WBC 5.9   RBC 4.61   HGB 14.1   HCT 43.3   MCV 94   MCH 30.6   MCHC 32.6   RDW 13.3        Last Basic Metabolic Panel:  Lab Results   Component Value Date     04/17/2021      Lab Results   Component Value Date    POTASSIUM 4.4 04/17/2021     Lab Results   Component Value Date    CHLORIDE 110 04/17/2021     Lab Results   Component Value Date    HOLLY 9.0 04/17/2021     Lab Results   Component Value Date "    CO2 31 04/17/2021     Lab Results   Component Value Date    BUN 14 04/17/2021     Lab Results   Component Value Date    CR 0.79 04/17/2021     Lab Results   Component Value Date    GLC 94 04/17/2021         IMPORTANT PENDING TEST RESULTS:  Pathology    CONDITION AT DISCHARGE:    Stabilized    DISCHARGE ORDERS  Current Discharge Medication List      START taking these medications    Details   acetaminophen-codeine (TYLENOL #3) 300-30 MG tablet Take 1 tablet by mouth every 6 hours as needed for severe pain  Qty: 12 tablet, Refills: 0    Associated Diagnoses: Pelvic floor dysfunction      ibuprofen (ADVIL/MOTRIN) 600 MG tablet Take 1 tablet (600 mg) by mouth every 6 hours as needed for other (mild and/or inflammatory pain)  Qty: 30 tablet, Refills: 0    Associated Diagnoses: Pelvic floor dysfunction         CONTINUE these medications which have NOT CHANGED    Details   alpha-d-galactosidase (BEANO) tablet Take 1 tablet by mouth daily as needed for intestinal gas      B Complex-C (VITAMIN B COMPLEX W/VITAMIN C) TABS tablet Take 1 tablet by mouth four times a week      Carboxymethylcell-Hypromellose (GENTEAL) 0.25-0.3 % GEL Place 1 drop into both eyes every evening      carboxymethylcellulose PF (REFRESH PLUS) 0.5 % ophthalmic solution Place 1 drop into both eyes every evening      Carboxymethylcellulose Sodium (THERATEARS) 0.25 % SOLN Place 1 drop into both eyes 4 times daily as needed      clindamycin (CLEOCIN T) 1 % external solution Apply topically daily as needed (ACNE/CUTS)      docusate sodium (COLACE) 100 MG capsule Take 100 mg by mouth daily as needed for constipation      Flaxseed, Linseed, (FLAX SEEDS) POWD Take 1 Tablespoonful by mouth every morning      flecainide (TAMBOCOR) 50 MG tablet Take 100 mg ( 2 tabs) twice daily  Qty: 360 tablet, Refills: 3    Comments: Please do not fill till patient calls for refill.  Associated Diagnoses: Palpitations      fluticasone (FLONASE) 50 MCG/ACT nasal spray Spray 1  spray into both nostrils daily      lactase (LACTAID) 3000 UNIT tablet Take 3,000 Units by mouth daily as needed for indigestion      lidocaine-prilocaine (EMLA) 2.5-2.5 % external cream Apply topically as needed (1 HOUR PRIOR TO PORT DRAINS/INFUSION)      methylcellulose (CITRUCEL) powder Take 1.5 teaspoonful by mouth every morning      multivitamin, therapeutic (THERA-VIT) TABS tablet Take 1 tablet by mouth three times a week       Oat Bran Soluble POWD Take 1 Tablespoonful by mouth daily      OVER-THE-COUNTER Place 1 drop into both eyes daily as needed Medication Name: Hylo-Forte Oph Soln      polyethylene glycol (MIRALAX) 17 GM/Dose powder Take 1 capful by mouth daily      polyethylene glycol-propylene glycol (SYSTANE ULTRA) 0.4-0.3 % SOLN ophthalmic solution Place 1 drop into both eyes 4 times daily as needed for dry eyes      polyethylene glycol-propylene glycol PF (SYSTANE HYDRATION PF) 0.4-0.3 % SOLN opthalmic solution Place 1 drop into both eyes 4 times daily as needed for dry eyes      Probiotic Product (PROBIOTIC DAILY PO) Take 1 capsule by mouth daily      simethicone (MYLICON) 125 MG chewable tablet Take 125 mg by mouth daily as needed for intestinal gas      Vitamin D (Cholecalciferol) 25 MCG (1000 UT) TABS Take 1 tablet by mouth four times a week             DISCHARGE INSTRUCTION.  Returning to work/activities:  -Nothing per vagina for 8 weeks  -Typically patients can expect to return to light work within 2-4 weeks.  -No driving or operating machinery while taking prescription pain medication.  -You should avoid heavy lifting until your follow up visit. No lifting greater than 20 pounds for 2 weeks.     Diet:  -as tolerated    Pain:  -Motrin (or other NSAIDs) are a good additional therapy and recommend trying this in addition to other pain relievers.  -Typically there is a minimal to moderate amount of incisional pain after surgery.  - An ice pack is recommended intermittently for the first 48 hours  to help reduce pain & swelling.  -Most patients are provided a prescription for medication to take on a short term basis to help relieve the discomfort.  -If pain medication refills are needed we require you contact our office during regular business hours. (8am-5pm Monday-Friday)  -Please be aware that pain medication can cause constipation.  It may be recommended to take an over the counter stool softener as directed to prevent constipation.  -You may also have bladder irritation from your surgery and this is normal. Please call if you have blood in your urine, fevers, or are unable to empty your bladder.     Incision/Wound care:  -A small amount of bleeding or drainage is expected in the first 24-48 hours.  -You many shower 24hrs after surgery.  It is ok to get the steri-strips/incision wet while showering & pat the area dry with a clean towel  -No bathtubs, hot tubs or swimming is recommended for at least 2 weeks.  -Expect to have vaginal drainage/spotting for up to 4-6 weeks from surgery. If more than a regular period please call our office.       Follow up care:  -You will be asked to see Dr. Martinez's office in 7 days.   -If you don't already have an appointment, please contact the office and our staff will happily assist you in scheduling your appointment. Bring your insurance card & government issued ID card to your appointment.    CALL YOUR SURGEON @ [phone] FOR ANY OF THE FOLLOWING:  -Temperature greater than 101 degrees Fahrenheit  -Increased pain  -Increased shortness of breath  -Increased bleeding or drainage or vaginal bleeding greater than a regular menses.   -Pus-like drainage, increasing redness, swelling, tenderness, or warmth at the incision site  -Persistent nausea or vomiting  -Difficulty having a bowel movement            FOLLOW-UP: Yaneli Ramey should see [facility] PRN.    Specialty follow-up: as above.     AFTER HOSPITAL RECOMMENDATIONS  As  above.      Physician(s) in addition to primary physician who should receive a copy:  Yana, [facility]       Bhavana Box PA-C

## 2022-06-10 NOTE — BRIEF OP NOTE
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]  Post-operative diagnosis Same as pre-operative diagnosis    Procedure: Procedure(s):  BILATERAL SALPINGO OOPHORECTOMY ; OMENTECTOMY ; MULTIPLE PERITONEAL BIOPSIES, BILATERAL URETEROLYSIS, WASHINGS, LYSIS OF ADHESIONS  Davinci Lysis of Adhesions  Ureterolysis  Surgeon: Surgeon(s) and Role:     * Cheryl Martinez MD - Primary  Anesthesia: General   Estimated Blood Loss: 25mL    Drains: None  Specimens:   ID Type Source Tests Collected by Time Destination   1 : bladder peritoneum biopsy Tissue Urinary Bladder SURGICAL PATHOLOGY EXAM Cheryl Martinez MD 6/9/2022  6:11 PM    2 : cul-de-sac biopsy #1 Tissue Other SURGICAL PATHOLOGY EXAM Cheryl Martinez MD 6/9/2022  6:28 PM    3 : cul-de-sac biopsy #2 Tissue Other SURGICAL PATHOLOGY EXAM Cheryl Martinez MD 6/9/2022  6:29 PM    4 : cul-de-sac biopsy #3 Tissue Other SURGICAL PATHOLOGY EXAM Cheryl Martinez MD 6/9/2022  6:30 PM    5 : omentum Tissue Omentum SURGICAL PATHOLOGY EXAM Cheryl Martinez MD 6/9/2022  6:40 PM    6 : LEFT OVARY Tissue Ovary, Left SURGICAL PATHOLOGY EXAM Cheryl Martinez MD 6/9/2022  6:40 PM    7 : RIGHT OVARY Tissue Ovary, Right SURGICAL PATHOLOGY EXAM Cheryl Martinez MD 6/9/2022  6:41 PM    8 : PELVIC WASHINGS Washings Pelvis NON-GYNECOLOGIC CYTOLOGY Cheryl Martinez MD 6/9/2022  6:49 PM      Findings:   No obvious diaphragmatic disease, scarring above the liver. Moderate adhesions from the omentum to the abdominal wall. No obvious disease in the omentum. Bilateral ovarian masses. Surgically absent uterus.  Complications: None.  Implants: * No implants in log *

## 2022-06-10 NOTE — ANESTHESIA CARE TRANSFER NOTE
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    Anesthesia Type:   General     Note:    Oropharynx: oropharynx clear of all foreign objects  Level of Consciousness: awake  Oxygen Supplementation: face mask  Level of Supplemental Oxygen (L/min / FiO2): 6  Independent Airway: airway patency satisfactory and stable  Dentition: dentition unchanged  Vital Signs Stable: post-procedure vital signs reviewed and stable  Report to RN Given: handoff report given  Patient transferred to: PACU    Handoff Report: Identifed the Patient, Identified the Reponsible Provider, Reviewed the pertinent medical history, Discussed the surgical course, Reviewed Intra-OP anesthesia mangement and issues during anesthesia, Set expectations for post-procedure period and Allowed opportunity for questions and acknowledgement of understanding      Vitals:  Vitals Value Taken Time   /78 06/09/22 1915   Temp     Pulse 82 06/09/22 1916   Resp 20 06/09/22 1916   SpO2 100 % 06/09/22 1916   Vitals shown include unvalidated device data.    Electronically Signed By: KIMBER Newell CRNA  June 9, 2022  7:17 PM

## 2022-07-18 NOTE — ED PROVIDER NOTES
"  History   Chief Complaint:  Hypertension and Dizziness     History supplemented by the patient, Yaneli and electronic chart review.     Yaneli Ramey is a 72 year old female with history of fallopian tube cancer and peripheral neuropathy who presents with hypertension and lack of coordination/dizziness. Patient reports this morning at 1100 she experienced the onset of \"ataxia\" and dizziness. Patient notes she feels like this after she comes off of her post-chemo steroid sequence 3 or 4 days after her session, however she has not had chemo in 3 months (stopped due to transition into interval debulking). Says her blood pressure was taken in response to the sudden onset of ataxia and went from 107-175, causing her to call the nursing line who recommended she come in to the ED.     Patient states she currently feels \"washed out\" and has experienced some \"gastric pain\" that she states rises up toward her chest, which is not present currently here in the ED.  Confirms having a port as well as a history of ectopic heart beat.  Denies weakness to one side, history of strokes or use of blood thinners. Patient is eating and drinking fine.  Had a COVID-19 booster shot 96 hours ago. Mentions she recently had her Flecainide dosage increased to 2x a day.     Review of Systems   All other systems reviewed and are negative.    Allergies:  Adhesive Tape  Cromolyn  Estradiol  Hydrocodone  Paclitaxel  Percocet [Oxycodone-Acetaminophen]  Septra [Sulfamethoxazole W-Trimethoprim]    Medications:  docusate sodium (COLACE) 100 MG capsule  flecainide (TAMBOCOR) 50 MG tablet  fluticasone (FLONASE) 50 MCG/ACT nasal spray  lactase (LACTAID) 3000 UNIT tablet  methylcellulose (CITRUCEL) powder  multivitamin, therapeutic (THERA-VIT) TABS tablet  polyethylene glycol (MIRALAX) 17 GM/Dose powder s  Probiotic Product (PROBIOTIC DAILY PO)  simethicone (MYLICON) 125 MG chewable tablet    Past Medical History:     Tachycardia   Carcinomatosis " "  Fallopian tube cancer   Hemorrhoids   Nuclear sclerosis   Asthma   Paroxysmal a-fib   Esophageal reflux     Past Surgical History:    Breast surgery  Bunionectomy   Davinci lysis of adhesions  Davinci salpingo-oophorectomy   Discectomy lumbar   Hysterectomy   Repair hammer toe   Repair ligament ulnar   Repair ptosis bilateral   Ureterolysis      Family History:    Father - Emphysema, CAD, Prostate and Colon cancer, CAD, COPD  Mother - Lupus, Hypertension, Type II diabetes, Hyperlipidemia, Renal detachment     Social History:  The patient presents to the ED via private vehicle.   PCP: Family Medicine - Dr. Roderick Sim  Patient is retired. Former psychologist in the forensics department.     Physical Exam     Patient Vitals for the past 24 hrs:   BP Temp Temp src Pulse Resp SpO2 Height Weight   07/18/22 2300 111/74 -- -- 75 13 95 % -- --   07/18/22 2230 126/88 -- -- 74 13 95 % -- --   07/18/22 2110 -- -- -- 73 15 97 % -- --   07/18/22 2100 (!) 150/87 -- -- 74 -- -- -- --   07/18/22 2040 -- -- -- 78 15 97 % -- --   07/18/22 2030 136/77 -- -- 75 15 98 % -- --   07/18/22 2020 -- -- -- 75 16 98 % -- --   07/18/22 1408 (!) 172/91 98.3  F (36.8  C) Temporal 92 16 98 % 1.791 m (5' 10.5\") 65.7 kg (144 lb 12.8 oz)     Physical Exam  General: Nontoxic-appearing woman sitting upright in room 29  HENT: mucous membranes moist, OP clear, face nontender  Eyes: PERRL, no nystagmus  CV: rate as above, regular rhythm, no murmur audible, no LE edema  Resp: normal effort, speaks in full phrases, no stridor, no cough observed  GI: abdomen soft and nontender, no guarding  MSK: no bony tenderness  Skin: appropriately warm and dry  Neuro: awake, alert, clear speech, fully oriented, face symmetric,  normal, strength and sensation intact in all extr, no nuchal rigidity, ambulation steady and independent (I had her get up and walk in room during my exam)  Psych: cooperative, slightly anxious, pleasant    Emergency Department Course "   ECG  ECG results from 07/18/22   EKG 12-lead, tracing only     Value    Systolic Blood Pressure     Diastolic Blood Pressure     Ventricular Rate 80    Atrial Rate 80    IA Interval 194    QRS Duration 106        QTc 461    P Axis 69    R AXIS 39    T Axis 55    Interpretation ECG      Sinus rhythm  Possible Left atrial enlargement  Incomplete left bundle branch block  Borderline ECG  When compared with ECG of 10-EDEN-2022 01:03,  IA interval has decreased  Incomplete left bundle branch block has replaced Non-specific intra-ventricular conduction delay         Imaging:  XR Chest 2 Views   Final Result   IMPRESSION: Right Mediport in the mid SVC. Heart normal in size. Lungs are hyperinflated compatible with COPD. Calcified granuloma left lower lung, unchanged. Lungs are otherwise clear.      CTA Head Neck w Contrast   Final Result   IMPRESSION:    HEAD CTA:    1.  Patent intracranial arterial vasculature without flow-limiting stenosis.      2.  No aneurysm.      NECK CTA:   1.  Patent cervical arterial vasculature without flow-limiting stenosis.      2.  There is possibly moderate spinal canal stenosis at C5-C6 and C6-C7.       CT Head w/o Contrast   Final Result   IMPRESSION:   1.  No evidence of an acute intracranial abnormality.   2.  Mild age-related change.      Echocardiogram Limited    (Results Pending)   MR Brain w/o & w Contrast    (Results Pending)     Report per radiology    Laboratory:  Labs Ordered and Resulted from Time of ED Arrival to Time of ED Departure   COMPREHENSIVE METABOLIC PANEL - Abnormal       Result Value    Sodium 141      Potassium 4.0      Chloride 107      Carbon Dioxide (CO2) 28      Anion Gap 6      Urea Nitrogen 17      Creatinine 0.74      Calcium 9.3      Glucose 95      Alkaline Phosphatase 60      AST 19      ALT 32      Protein Total 6.7 (*)     Albumin 3.8      Bilirubin Total 0.7      GFR Estimate 85     TROPONIN I - Abnormal    Troponin I High Sensitivity 60 (*)     LIPASE - Normal    Lipase 169     URINE MACROSCOPIC WITH REFLEX TO MICRO - Normal    Color Urine Light Yellow      Appearance Urine Clear      Glucose Urine Negative      Bilirubin Urine Negative      Ketones Urine Negative      Specific Gravity Urine 1.009      Blood Urine Negative      pH Urine 7.0      Protein Albumin Urine Negative      Urobilinogen Urine Normal      Nitrite Urine Negative      Leukocyte Esterase Urine Negative     COVID-19 VIRUS (CORONAVIRUS) BY PCR - Normal    SARS CoV2 PCR Negative     CK TOTAL - Normal    CK 89     CBC WITH PLATELETS AND DIFFERENTIAL    WBC Count 6.0      RBC Count 4.19      Hemoglobin 13.2      Hematocrit 40.8      MCV 97      MCH 31.5      MCHC 32.4      RDW 13.9      Platelet Count 195      % Neutrophils 52      % Lymphocytes 40      % Monocytes 7      % Eosinophils 1      % Basophils 0      % Immature Granulocytes 0      NRBCs per 100 WBC 0      Absolute Neutrophils 3.1      Absolute Lymphocytes 2.4      Absolute Monocytes 0.4      Absolute Eosinophils 0.0      Absolute Basophils 0.0      Absolute Immature Granulocytes 0.0      Absolute NRBCs 0.0     TROPONIN I      Emergency Department Course:         Reviewed:  I reviewed nursing notes, vitals, past medical history and Care Everywhere    Assessments/Consults:  ED Course as of 07/18/22 2202 Mon Jul 18, 2022 1843 I obtained history and examined the patient.    2034 I rechecked the patient and explained findings.    2057 I rechecked the patient and explained findings.    2202 I spoke to Dr. Crawford, hospitalist, who agrees to admit the patient.        Interventions:  Medications   aspirin (ASA) chewable tablet 324 mg (324 mg Oral Given 7/18/22 2129)       Disposition:  The patient was admitted to the hospital under the care of Dr. Crawford.     Impression & Plan   Medical Decision Making:  I considered the possibility of TIA or stroke as possible causes of her recent onset dizziness, along with intracranial  hemorrhage, tumor, metabolic abnormality, arrhythmia, and many others.  However, given that her examination was normal, I did not feel that code stroke was indicated, though did proceed promptly with CT and CT angiogram with results as above.  I explained to the patient that I think she would benefit from MRI for definitive evaluation of possible ischemic stroke.  In addition, she is found to have a slightly elevated troponin.  My suspicion for an acute coronary syndrome is not particularly high, though in light of this finding, she was given aspirin and I have arranged for hospitalization with anticipation that she would benefit from serial troponins, close clinical monitoring, telemetry, and MRI of the brain.  This was discussed with the patient who was understandably disappointed that this will mean her anticipated resumption of chemotherapy will need to be deferred, though I do think it is most important that we address these other concerns before considering her ongoing oncologic care.  I have arranged for admission to a monitored bed under the care of the hospitalist service after discussion of the above concerns.    Covid-19  Yaneli Ramey was evaluated during a global COVID-19 pandemic, which necessitated consideration that the patient might be at risk for infection with the SARS-CoV-2 virus that causes COVID-19.   Applicable protocols for evaluation were followed during the patient's care.   COVID-19 was considered as part of the patient's evaluation. The plan for testing is:  a test was obtained during this visit.    Diagnosis:    ICD-10-CM    1. Dizziness  R42    2. Elevated troponin  R77.8    3. Gynecologic malignancy (H)  C57.9    4. Elevated blood pressure reading  R03.0        Scribe Disclosure:  I, LAYO HUNTER, am serving as a scribe at 6:27 PM on 7/18/2022 to document services personally performed by Dhruv Hutchinson, based on my observations and the provider's statements to me.         Dhruv Hutchinson MD  07/19/22 2271

## 2022-07-18 NOTE — ED TRIAGE NOTES
Pt. presents to ED with complaints of visual disorientation/stumbling/decreased spacial awareness when she got up to move about 3 hours ago. Pt. Reports she's also developing pressure in her chest. Pt. Also reports higher BPs at home today. Pt. Reports she's been hypotensive for the last several weeks, systolics (80-90s). Pt. Hypertensive in triage, OVSS on RA. Pt. Reports she's had more gas, gurgling and BMs today. Pt. Reports she had a COVID booster on Thursday. Pt. Reports a normal ECHO on Thursday.

## 2022-07-19 PROBLEM — R07.89 OTHER CHEST PAIN: Status: ACTIVE | Noted: 2022-01-01

## 2022-07-19 PROBLEM — R27.0 ATAXIA: Status: ACTIVE | Noted: 2022-01-01

## 2022-07-19 PROBLEM — R42 DIZZINESS: Status: ACTIVE | Noted: 2022-01-01

## 2022-07-19 NOTE — PHARMACY-ADMISSION MEDICATION HISTORY
Admission medication history interview status for this patient is complete. See [facility] admission navigator for allergy information, prior to admission medications and immunization status.     Medication history interview source(s):Patient  Medication history resources (including written lists, pill bottles, clinic record):[place] list, [facility]  Primary pharmacy:[facility] in [facility] on [address]    Changes made to PTA medication list:  Added: tylenol #3, allegra  Deleted: duplicate systane ultra qid prn  Changed: probiotic from daily to daily prn per pt, flonase from daily to daily prn per pt, docusate from 100mg to 100-200mg per pt    Actions taken by pharmacist (provider contacted, etc):None     Additional medication history information:Pt has several OTC eye drops that she uses as well as gel for dry eyes.  Pt has dry eyes chronically but this gets worse while she is on chemo.  Pt has not been on chemo for 3 months.  Last chemo 4/19/2022, pt had surgery 6/9 for cancer diagnosis and was going to restart chemo today at [facility].  Per pt, was going to resume abraxane and carboplatin.    Medication reconciliation/reorder completed by provider prior to medication history? Yes, sticky note left for MD      For patients on insulin therapy:N    Prior to Admission medications    Medication Sig Last Dose Taking? Auth Provider Long Term End Date   acetaminophen-codeine (TYLENOL #3) 300-30 MG tablet Take 1 tablet by mouth every 6 hours as needed for severe pain Past Month at Unknown time Yes Unknown, Entered By History     alpha-d-galactosidase (BEANO) tablet Take 1 tablet by mouth daily as needed for intestinal gas More than a month at Unknown time Yes Reported, Patient     B Complex-C (VITAMIN B COMPLEX W/VITAMIN C) TABS tablet Take 1 tablet by mouth four times a week Monday, Wednesday, Friday, Saturday 7/18/2022 at am Yes Reported, Patient     Carboxymethylcell-Hypromellose (GENTEAL) 0.25-0.3 % GEL Place 1 drop into both  eyes every evening as needed Past Month at Unknown time Yes Reported, Patient     carboxymethylcellulose PF (REFRESH PLUS) 0.5 % ophthalmic solution Place 1 drop into both eyes every evening Past Week at Unknown time Yes Reported, Patient     Carboxymethylcellulose Sodium (THERATEARS) 0.25 % SOLN Place 1 drop into both eyes 4 times daily as needed Past Week at Unknown time Yes Reported, Patient     clindamycin (CLEOCIN T) 1 % external solution Apply topically daily as needed (ACNE/CUTS) Past Week at Unknown time Yes Reported, Patient     docusate sodium (COLACE) 100 MG capsule Take 100-200 mg by mouth daily as needed for constipation More than a month at Unknown time Yes Reported, Patient     fexofenadine (ALLEGRA) 180 MG tablet Take 180 mg by mouth daily as needed for allergies Past Month at Unknown time Yes Unknown, Entered By History     Flaxseed, Linseed, (FLAX SEEDS) POWD Take 1 Tablespoonful by mouth every morning 7/18/2022 at Unknown time Yes Reported, Patient     flecainide (TAMBOCOR) 50 MG tablet Take 100 mg ( 2 tabs) twice daily 7/18/2022 at pm Yes José Miguel Martin MD Yes    fluticasone (FLONASE) 50 MCG/ACT nasal spray Spray 1 spray into both nostrils daily as needed More than a month at Unknown time Yes Reported, Patient     lactase (LACTAID) 3000 UNIT tablet Take 3,000 Units by mouth daily as needed for indigestion More than a month at Unknown time Yes Reported, Patient     lidocaine-prilocaine (EMLA) 2.5-2.5 % external cream Apply topically as needed (1 HOUR PRIOR TO PORT DRAINS/INFUSION) More than a month at Unknown time Yes Reported, Patient Yes    methylcellulose (CITRUCEL) powder Take 1.5 teaspoonful by mouth every morning 7/18/2022 at Unknown time Yes Reported, Patient     multivitamin, therapeutic (THERA-VIT) TABS tablet Take 1 tablet by mouth three times a week Tuesday, Thursday, Sunday Past Week at Unknown time Yes Unknown, Entered By History     Oat Bran Soluble POWD Take 1  Tablespoonful by mouth daily 7/18/2022 at Unknown time Yes Reported, Patient     OVER-THE-COUNTER Place 1 drop into both eyes daily as needed Medication Name: Hylo-Forte Oph Soln Past Week at Unknown time Yes Reported, Patient     polyethylene glycol (MIRALAX) 17 GM/Dose powder Take 1 capful by mouth daily 7/18/2022 at Unknown time Yes Reported, Patient     polyethylene glycol-propylene glycol PF (SYSTANE HYDRATION PF) 0.4-0.3 % SOLN opthalmic solution Place 1 drop into both eyes 4 times daily as needed for dry eyes Past Week at Unknown time Yes Reported, Patient     Probiotic Product (PROBIOTIC DAILY PO) Take 1 capsule by mouth daily as needed Past Month at Unknown time Yes Unknown, Entered By History     simethicone (MYLICON) 125 MG chewable tablet Take 125 mg by mouth daily as needed for intestinal gas More than a month at Unknown time Yes Reported, Patient     Vitamin D (Cholecalciferol) 25 MCG (1000 UT) TABS Take 1 tablet by mouth four times a week Monday, Wednesday, Friday, Saturday 7/18/2022 at Unknown time Yes Reported, Patient

## 2022-07-19 NOTE — DISCHARGE SUMMARY
[facility]  Discharge Summary        Yaneli Ramey MRN# [id number]   YOB: 1950 Age: 72 year old     Date of Admission:  7/18/2022  Date of Discharge:  7/19/2022  Admitting Physician:  Victor Hugo Crawford MD  Discharge Physician: Jena Cisneros MD  Discharging Service: Hospitalist     Primary Provider: [facility] [address] [facility]  Primary Care Physician Phone Number: [phone]         Discharge Diagnoses/Problem Oriented Hospital Course (Providers):    Yaneli Ramey was admitted on 7/18/2022 by Victor Hugo Crawford MD and I would refer you to their history and physical.  The following problems were addressed during her hospitalization:    Yaneli Ramey is a 72 year old female with history of supraventricular tachycardia, neoplasm of fallopian tube, salpingo-oophorectomy, hysterectomy, GERD, asthma, and posterior vitreous detachment. Chart review suggests history of AFIB but patient states that this is inaccurate. She presented to the [facility] emergency department today (7/18/2022) for evaluation of elevated blood pressure and dizziness.  At 11:00 this morning she had onset of difficulty walking and dizziness.  She has had similar symptoms in the past when coming off of her postchemotherapy steroid burst but has not had chemotherapy for about 3 months.  She took her blood pressure and it went from a systolic pressure of 107 to 175.  She reported some gastric pain that radiated up towards her chest.  She reported that her dose of flecainide was recently increased to twice daily.  Emergency department evaluation showed stable vital signs.  Laboratory evaluation showed high-sensitivity troponin of 60 but was otherwise unremarkable including comprehensive metabolic panel, lipase, CBC, and urine analysis.  Chest x-ray showed hyperinflated lungs but was otherwise unremarkable.  CT of head and CTA of head and neck showed no acute process.     Problem  list:     Dizziness  Ataxia  Consider idiopathic  Ruled out stroke     -Admit to observation  -Monitor on telemetry  -Was given aspirin 324 mg in the ED.  Continue 81 mg daily for now  MRI brain was done and returned negative   -Recently had echo on 7/14/22 that showed EF 55-60% and no significant abnormalities.   Echo was normal and it showed   The visual ejection fraction is 55-60%.   There is mild (1+) mitral regurgitation.   No regional wall motion abnormalities noted.   The study was technically limited.   Symptoms improved     Epigastric and chest discomfort  Troponin elevation  -Sounds atypical for pain related to coronary artery disease but with elevated troponin will evaluate further  Symptoms improved   -Monitor on telemetry  -serial trops 60-96   Echo as noted above   Nabil scan stress test done and results were normal   Please see results below in the imaging results     Paroxysmal supraventricular tachycardia  -Resume prior to admission flecainide     Fallopian tube carcinoma  -Had been on chemotherapy up to 3 months ago  -Was to possibly resume chemotherapy tomorrow  -Follow-up with oncology as previously planned after discharge     Asthma, without acute exacerbation           Diet:  Regular diet  DVT Prophylaxis: Pneumatic Compression Devices  Milton Catheter: Not present  Central Lines: None  Cardiac Monitoring: None  Code Status:  Full code    Dispo: home today in stable condition     Jena Cisneros MD   Page [phone](7AM-6PM)             Code Status:      Full Code        Brief Hospital Stay Summary Sent Home With Patient in AVS:        Reason for your hospital stay      Dizziness of unclear etiology                 Important Results:      See below         Pending Results:        Unresulted Labs Ordered in the Past 30 Days of this Admission     No orders found for last 31 day(s).            Discharge Instructions and Follow-Up:      Follow-up Appointments     Follow-up and recommended labs and  tests       F/u with PCP in 1week               Discharge Disposition:      Discharged to home        Discharge Medications:        Current Discharge Medication List      CONTINUE these medications which have NOT CHANGED    Details   acetaminophen-codeine (TYLENOL #3) 300-30 MG tablet Take 1 tablet by mouth every 6 hours as needed for severe pain      alpha-d-galactosidase (BEANO) tablet Take 1 tablet by mouth daily as needed for intestinal gas      B Complex-C (VITAMIN B COMPLEX W/VITAMIN C) TABS tablet Take 1 tablet by mouth four times a week Monday, Wednesday, Friday, Saturday      Carboxymethylcell-Hypromellose (GENTEAL) 0.25-0.3 % GEL Place 1 drop into both eyes every evening as needed      carboxymethylcellulose PF (REFRESH PLUS) 0.5 % ophthalmic solution Place 1 drop into both eyes every evening      Carboxymethylcellulose Sodium (THERATEARS) 0.25 % SOLN Place 1 drop into both eyes 4 times daily as needed      clindamycin (CLEOCIN T) 1 % external solution Apply topically daily as needed (ACNE/CUTS)      docusate sodium (COLACE) 100 MG capsule Take 100-200 mg by mouth daily as needed for constipation      fexofenadine (ALLEGRA) 180 MG tablet Take 180 mg by mouth daily as needed for allergies      Flaxseed, Linseed, (FLAX SEEDS) POWD Take 1 Tablespoonful by mouth every morning      flecainide (TAMBOCOR) 50 MG tablet Take 100 mg ( 2 tabs) twice daily  Qty: 360 tablet, Refills: 3    Comments: Please do not fill till patient calls for refill.  Associated Diagnoses: Palpitations      fluticasone (FLONASE) 50 MCG/ACT nasal spray Spray 1 spray into both nostrils daily as needed      lactase (LACTAID) 3000 UNIT tablet Take 3,000 Units by mouth daily as needed for indigestion      lidocaine-prilocaine (EMLA) 2.5-2.5 % external cream Apply topically as needed (1 HOUR PRIOR TO PORT DRAINS/INFUSION)      methylcellulose (CITRUCEL) powder Take 1.5 teaspoonful by mouth every morning      multivitamin, therapeutic  "(THERA-VIT) TABS tablet Take 1 tablet by mouth three times a week Tuesday, Thursday, Sunday      Oat Bran Soluble POWD Take 1 Tablespoonful by mouth daily      OVER-THE-COUNTER Place 1 drop into both eyes daily as needed Medication Name: Hylo-Forte Oph Soln      polyethylene glycol (MIRALAX) 17 GM/Dose powder Take 1 capful by mouth daily      polyethylene glycol-propylene glycol PF (SYSTANE HYDRATION PF) 0.4-0.3 % SOLN opthalmic solution Place 1 drop into both eyes 4 times daily as needed for dry eyes      Probiotic Product (PROBIOTIC DAILY PO) Take 1 capsule by mouth daily as needed      simethicone (MYLICON) 125 MG chewable tablet Take 125 mg by mouth daily as needed for intestinal gas      Vitamin D (Cholecalciferol) 25 MCG (1000 UT) TABS Take 1 tablet by mouth four times a week Monday, Wednesday, Friday, Saturday         STOP taking these medications       polyethylene glycol-propylene glycol (SYSTANE ULTRA) 0.4-0.3 % SOLN ophthalmic solution Comments:   Reason for Stopping:                 Allergies:         Allergies   Allergen Reactions     Adhesive Tape      Cromolyn      Estradiol      Hydrocodone Other (See Comments)     Other Drug Allergy (See Comments)      Percodan       Paclitaxel      Percocet [Oxycodone-Acetaminophen]      Septra [Sulfamethoxazole W-Trimethoprim]            Consultations This Hospital Stay:      No consultations were requested during this admission        Condition and Physical on Discharge:      Discharge condition: Stable   Vitals: Blood pressure 128/82, pulse 82, temperature 98.3  F (36.8  C), temperature source Temporal, resp. rate 18, height 1.791 m (5' 10.5\"), weight 65.7 kg (144 lb 12.8 oz), SpO2 98 %, not currently breastfeeding.     Constitutional: Alert, awake, NAD    Lungs: CTA b/l    Cardiovascular: RRR   Abdomen: Soft, NT, ND, BS+   Skin: Warm and dry    Other:          Discharge Time:      Greater than 30 minutes.        Image Results From This Hospital Stay (For " Non-EPIC Providers):        Results for orders placed or performed during the hospital encounter of 07/18/22   CT Head w/o Contrast    Narrative    EXAM: CT HEAD W/O CONTRAST  LOCATION: [facility]  DATE/TIME: 7/18/2022 7:41 PM    INDICATION: Acute ataxia earlier today, gynecologic cancer  COMPARISON: Brain MRI 02/09/2022..  TECHNIQUE: Routine CT Head without IV contrast. Multiplanar reformats. Dose reduction techniques were used.    FINDINGS:  INTRACRANIAL CONTENTS: No intracranial hemorrhage, extraaxial collection, or mass effect.  Note is made of a low sensitivity of noncontrast CT for detecting intracranial metastatic disease. No CT evidence of acute infarct. Mild presumed chronic small   vessel ischemic changes. Mild generalized volume loss. No hydrocephalus. The sella is unremarkable for technique. The cerebellar tonsils are appropriately positioned.     VISUALIZED ORBITS/SINUSES/MASTOIDS: No intraorbital abnormality. No paranasal sinus mucosal disease. No middle ear or mastoid effusion.    BONES/SOFT TISSUES: No scalp hematoma. No skull fracture.      Impression    IMPRESSION:  1.  No evidence of an acute intracranial abnormality.  2.  Mild age-related change.   CTA Head Neck w Contrast    Narrative    EXAM: CTA HEAD NECK W CONTRAST  LOCATION: [facility]  DATE/TIME: 7/18/2022 7:40 PM    INDICATION: acute ataxia earlier today, gynecologic cancer  COMPARISON: Same-day head CT.  CONTRAST: 68mL Isovue 370  TECHNIQUE: Head and neck CT angiogram with IV contrast. Axial helical CT images of the head and neck vessels obtained during the arterial phase of intravenous contrast administration. Axial 2D reconstructed images and multiplanar 3D MIP reconstructed   images of the head and neck vessels were performed by the technologist. Dose reduction techniques were used. All stenosis measurements made according to NASCET criteria unless otherwise specified.    FINDINGS:   HEAD  CTA:  ANTERIOR CIRCULATION: No stenosis/occlusion, aneurysm, or high flow vascular malformation. Standard [place] anatomy.    POSTERIOR CIRCULATION: No stenosis/occlusion, aneurysm, or high flow vascular malformation. Balanced vertebral arteries supply a normal basilar artery.     DURAL VENOUS SINUSES: Expected enhancement of the major dural venous sinuses.    NECK CTA:  RIGHT CAROTID: No measurable stenosis or dissection.    LEFT CAROTID: No measurable stenosis or dissection.    VERTEBRAL ARTERIES: No focal stenosis or dissection. Balanced vertebral arteries.    AORTIC ARCH: There is common origin of the right brachiocephalic artery and left common carotid artery.    NONVASCULAR STRUCTURES: There is a 0.3 cm low-density nodule in the right thyroid lobe. Moderate spinal canal stenosis at C5-C6 and C6-C7.      Impression    IMPRESSION:   HEAD CTA:   1.  Patent intracranial arterial vasculature without flow-limiting stenosis.    2.  No aneurysm.    NECK CTA:  1.  Patent cervical arterial vasculature without flow-limiting stenosis.    2.  There is possibly moderate spinal canal stenosis at C5-C6 and C6-C7.    XR Chest 2 Views    Narrative    EXAM: XR CHEST 2 VW  LOCATION: [facility]  DATE/TIME: 7/18/2022 9:17 PM    INDICATION: chest pain, no fever cough, known gyn cancer  COMPARISON: 3/6/2019      Impression    IMPRESSION: Right Mediport in the mid SVC. Heart normal in size. Lungs are hyperinflated compatible with COPD. Calcified granuloma left lower lung, unchanged. Lungs are otherwise clear.   MR Brain w/o & w Contrast    Narrative    EXAM: MR BRAIN W/O and W CONTRAST  LOCATION: [facility]  DATE/TIME: 7/19/2022 3:09 AM    INDICATION: Ataxia and dizziness. Evaluate for stroke.  COMPARISON: CT head 7/18/2022 and brain MRI from 2/9/2022  CONTRAST: 6.5 mL Gadavist  TECHNIQUE: Routine multiplanar multisequence head MRI without and with intravenous  contrast.    FINDINGS:  INTRACRANIAL CONTENTS: No acute or subacute infarct. No mass, acute hemorrhage, or extra-axial fluid collections. Minimal burden presumed chronic small vessel ischemia is stable. Normal ventricles and sulci. Normal position of the cerebellar tonsils.   Incidental left orbitofrontal developmental venous anomaly. No other abnormal enhancement.    SELLA: No abnormality accounting for technique.    OSSEOUS STRUCTURES/SOFT TISSUES: Normal marrow signal. The major intracranial vascular flow voids are maintained.     ORBITS: No abnormality accounting for technique.     SINUSES/MASTOIDS: No paranasal sinus mucosal disease. No middle ear or mastoid effusion.       Impression    IMPRESSION:  1.  No acute intracranial abnormality or significant change compared to the prior study.   Echocardiogram Limited     Value    LVEF  55-60%    Narrative    [id number]  [id number]  [id number]  [id number]^MECCA^KARLO^CLEMENTE     [facility]  Echocardiography Laboratory  [address] [place] [address]     Name: ROBERT POOLE  MRN: [id number]  : 1950  Study Date: 2022 08:10 AM  Age: 72 yrs  Gender: Female  Patient Location: [facility]  Reason For Study: Elevated Trops  Ordering Physician: KARLO WING  Performed By: Milena Cazares     BSA: 1.8 m2  Height: 70 in  Weight: 144 lb  HR: 59  BP: 111/74 mmHg  ______________________________________________________________________________  Procedure  Limited Portable Echo Adult.  ______________________________________________________________________________  Interpretation Summary     The visual ejection fraction is 55-60%.  There is mild (1+) mitral regurgitation.  No regional wall motion abnormalities noted.  The study was technically limited.  ______________________________________________________________________________  Left Ventricle  The left ventricle is normal in size. There is normal left ventricular wall  thickness. The visual  ejection fraction is 55-60%. Left ventricular diastolic  function is normal. No regional wall motion abnormalities noted.     Right Ventricle  The right ventricle is normal in structure, function and size.     Atria  Normal left atrial size. Right atrial size is normal.     Mitral Valve  Thickened mitral valve anterior leaflet. There is mild (1+) mitral  regurgitation. The mitral regurgitant jet is posteriorly directed, which is  consistent with anterior leaflet pathology.     Tricuspid Valve  The tricuspid valve is not well visualized, but is grossly normal. There is  trace to mild tricuspid regurgitation. Right ventricular systolic pressure is  normal.     Aortic Valve  There is mild trileaflet aortic sclerosis.     Pulmonic Valve  The pulmonic valve is not well seen, but is grossly normal.     Vessels  The aortic root is normal size. The inferior vena cava is normal.     Pericardium  There is no pericardial effusion.     Rhythm  Sinus rhythm was noted.  ______________________________________________________________________________  Doppler Measurements & Calculations  TR max rajeev: 215.4 cm/sec  TR max P.6 mmHg     ______________________________________________________________________________  Report approved by: Zuleika Urbina 2022 10:14 AM         NM MPI w Lexiscan     Value    Target     Baseline Systolic     Baseline Diastolic BP 81    Baseline HR 75    Left Ventricular EF 66    Narrative       The nuclear stress test is probably negative for inducible myocardial   ischemia or infarction. The left ventricular ejection fraction at stress   is 66%. Left ventricular function is normal.     There is no prior study for comparison.               Most Recent Lab Results In EPIC (For Non-EPIC Providers):    Most Recent 3 CBC's:  Recent Labs   Lab Test 22  1835 06/10/22  0043 21  1056   WBC 6.0 8.8 5.9   HGB 13.2 12.4 14.1   MCV 97 96 94    182 226      Most Recent 3  BMP's:  Recent Labs   Lab Test 07/18/22  1835 06/10/22  0605 06/10/22  0043 04/17/21  1056     --  140 143   POTASSIUM 4.0  --  4.2 4.4   CHLORIDE 107  --  109 110*   CO2 28  --  22 31   BUN 17  --  19 14   CR 0.74  --  0.97 0.79   ANIONGAP 6  --  9 2*   HOLLY 9.3  --  8.3* 9.0   GLC 95 126* 138* 94     Most Recent 3 Troponin's:  Recent Labs   Lab Test 04/17/21  1056 04/15/21  1133 04/15/21  0921   TROPI <0.015 <0.015 <0.015     Most Recent 3 INR's:No lab results found.  Most Recent 2 LFT's:  Recent Labs   Lab Test 07/19/22  0839 07/18/22  1835   AST 14 19   ALT 27 32   ALKPHOS 54 60   BILITOTAL 0.5 0.7     Most Recent Cholesterol Panel:No lab results found.  Most Recent 6 Bacteria Isolates From Any Culture (See EPIC Reports for Culture Details):  Recent Labs   Lab Test 01/04/17  1716 01/04/17  1250   CULT Duplicate request Charge credited 10,000 to 50,000 colonies/mL Escherichia coli  <10,000 colonies/mL mixed urogenital yang Susceptibility testing not routinely   done  *     Most Recent TSH, T4 and HgbA1c:   Recent Labs   Lab Test 04/15/21  0921 01/05/17  0914   TSH 1.87  --    A1C  --  5.9

## 2022-07-19 NOTE — ED NOTES
"[facility]  ED Nurse Handoff Report    Yaneli Ramey is a 72 year old female   ED Chief complaint: Hypertension and Dizziness  . ED Diagnosis:   Final diagnoses:   Dizziness   Elevated troponin   Gynecologic malignancy (H)   Elevated blood pressure reading     Allergies:   Allergies   Allergen Reactions     Adhesive Tape      Cromolyn      Estradiol      Hydrocodone Other (See Comments)     Other Drug Allergy (See Comments)      Percodan       Paclitaxel      Percocet [Oxycodone-Acetaminophen]      Septra [Sulfamethoxazole W-Trimethoprim]        Code Status: Full Code  Activity level - Baseline/Home:  Independent. Activity Level - Current:   Stand by Assist. Lift room needed: No. Bariatric: No   Needed: No   Isolation: No. Infection: Not Applicable.     Vital Signs:   Vitals:    07/18/22 2030 07/18/22 2040 07/18/22 2100 07/18/22 2110   BP: 136/77  (!) 150/87    Pulse: 75 78 74 73   Resp: 15 15  15   Temp:       TempSrc:       SpO2: 98% 97%  97%   Weight:       Height:           Cardiac Rhythm:  ,      Pain level:    Patient confused: No. Patient Falls Risk: Yes.   Elimination Status: Has voided   Patient Report - Initial Complaint: Hypertension and dizziness. Focused Assessment:  Yaneli Ramey is a 72 year old female with history of fallopian tube cancer and peripheral neuropathy who presents with hypertension and lack of coordination/dizziness. Patient reports this morning at 1100 she experienced the onset of \"ataxia\" and dizziness. Patient notes she feels like this after she comes off of her post-chemo steroid sequence 3 or 4 days after her session, however she has not had chemo in 3 months (stopped due to transition into interval debulking). Says her blood pressure was taken in response to the sudden onset of ataxia and went from 107-175, causing her to call the nursing line who recommended she come in to the ED.      Patient states she currently feels \"washed out\" and has " "experienced some \"gastric pain\" that she states rises up toward her chest, which is not present currently here in the ED.  Confirms having a port as well as a history of ectopic heart beat.  Denies weakness to one side, history of strokes or use of blood thinners. Patient is eating and drinking fine.  Had a COVID-19 booster shot 96 hours ago. Mentions she recently had her Flecainide dosage increased to 2x a day.   Tests Performed: lab work, CT scan, chest xray. Abnormal Results:   Labs Ordered and Resulted from Time of ED Arrival to Time of ED Departure   COMPREHENSIVE METABOLIC PANEL - Abnormal       Result Value    Sodium 141      Potassium 4.0      Chloride 107      Carbon Dioxide (CO2) 28      Anion Gap 6      Urea Nitrogen 17      Creatinine 0.74      Calcium 9.3      Glucose 95      Alkaline Phosphatase 60      AST 19      ALT 32      Protein Total 6.7 (*)     Albumin 3.8      Bilirubin Total 0.7      GFR Estimate 85     TROPONIN I - Abnormal    Troponin I High Sensitivity 60 (*)    LIPASE - Normal    Lipase 169     URINE MACROSCOPIC WITH REFLEX TO MICRO - Normal    Color Urine Light Yellow      Appearance Urine Clear      Glucose Urine Negative      Bilirubin Urine Negative      Ketones Urine Negative      Specific Gravity Urine 1.009      Blood Urine Negative      pH Urine 7.0      Protein Albumin Urine Negative      Urobilinogen Urine Normal      Nitrite Urine Negative      Leukocyte Esterase Urine Negative     CBC WITH PLATELETS AND DIFFERENTIAL    WBC Count 6.0      RBC Count 4.19      Hemoglobin 13.2      Hematocrit 40.8      MCV 97      MCH 31.5      MCHC 32.4      RDW 13.9      Platelet Count 195      % Neutrophils 52      % Lymphocytes 40      % Monocytes 7      % Eosinophils 1      % Basophils 0      % Immature Granulocytes 0      NRBCs per 100 WBC 0      Absolute Neutrophils 3.1      Absolute Lymphocytes 2.4      Absolute Monocytes 0.4      Absolute Eosinophils 0.0      Absolute Basophils 0.0      " Absolute Immature Granulocytes 0.0      Absolute NRBCs 0.0       XR Chest 2 Views   Final Result   IMPRESSION: Right Mediport in the mid SVC. Heart normal in size. Lungs are hyperinflated compatible with COPD. Calcified granuloma left lower lung, unchanged. Lungs are otherwise clear.      CTA Head Neck w Contrast   Final Result   IMPRESSION:    HEAD CTA:    1.  Patent intracranial arterial vasculature without flow-limiting stenosis.      2.  No aneurysm.      NECK CTA:   1.  Patent cervical arterial vasculature without flow-limiting stenosis.      2.  There is possibly moderate spinal canal stenosis at C5-C6 and C6-C7.       CT Head w/o Contrast   Final Result   IMPRESSION:   1.  No evidence of an acute intracranial abnormality.   2.  Mild age-related change.      .   Treatments provided: aspirin  Family Comments: Pt here by herself   OBS brochure/video discussed/provided to patient:  N/A  ED Medications:   Medications   CT Scan Flush (80 mLs Intravenous Given 7/18/22 1942)   iopamidol (ISOVUE-370) solution 500 mL (68 mLs Intravenous Given 7/18/22 1941)   aspirin (ASA) chewable tablet 324 mg (324 mg Oral Given 7/18/22 2129)     Drips infusing:  No  For the majority of the shift, the patient's behavior Green. Interventions performed were n/a.    Sepsis treatment initiated: No     Patient tested for COVID 19 prior to admission: YES    ED Nurse Name/Phone Number: Monse Oropeza RN,   9:42 PM

## 2022-07-19 NOTE — PROGRESS NOTES
PRIMARY DIAGNOSIS: DIZZINESS  OUTPATIENT/OBSERVATION GOALS TO BE MET BEFORE DISCHARGE:  1. ADLs back to baseline: Yes    2. Activity and level of assistance: Ambulating independently.    3. Pain status: Pain free.    4. Return to near baseline physical activity: Yes     Discharge Planner Nurse   Safe discharge environment identified: Yes  Barriers to discharge: Yes       Entered by: Ale Watt RN 07/19/2022 2:04 PM     Please review provider order for any additional goals.   Nurse to notify provider when observation goals have been met and patient is ready for discharge.

## 2022-07-19 NOTE — PLAN OF CARE
PT: Received orders for physical therapy evaluation and treatment.  Patient admitted with dizziness, elevated blood pressure, difficulty walking.  Per chart review and discussion with RN, patient is able to ambulate independently and reports no dizziness.  No indication for inpatient physical therapy evaluation at this time.  Will complete order.  Please re-consult if mobility needs change.

## 2022-07-19 NOTE — PROGRESS NOTES
PRIMARY DIAGNOSIS: DIZZINESS  OUTPATIENT/OBSERVATION GOALS TO BE MET BEFORE DISCHARGE:  1. ADLs back to baseline: Yes    2. Activity and level of assistance: Ambulating independently.    3. Pain status: Pain free.    4. Return to near baseline physical activity: Yes     Discharge Planner Nurse   Safe discharge environment identified: Yes  Barriers to discharge: Yes       Entered by: Ale Watt RN 07/19/2022 2:03 PM     Please review provider order for any additional goals.   Nurse to notify provider when observation goals have been met and patient is ready for discharge.

## 2022-07-19 NOTE — DISCHARGE INSTRUCTIONS
Primary Care Appointment   [facility] [address] [place] [address]  Phone: [phone]  Date: 7/29/22 at 11:05 am    [place] [facility]  [address] [address]  Phone: [phone]  Date: 7/26/22 1:15 PM    Cardiopulmonary should be contacting you to setup the event monitor. If you do not hear from them, you can call #[phone].

## 2022-07-19 NOTE — PROGRESS NOTES
Care Management Follow Up    Length of Stay (days): 0    Expected Discharge Date:       Concerns to be Addressed:       Patient plan of care discussed at interdisciplinary rounds: No    Anticipated Discharge Disposition:       Anticipated Discharge Services:    Anticipated Discharge DME:      Patient/family educated on Medicare website which has current facility and service quality ratings:    Education Provided on the Discharge Plan:    Patient/Family in Agreement with the Plan:      Referrals Placed by CM/SW:    Private pay costs discussed: Not applicable    Additional Information:  SW received call from patients [employer]  Tjnebq-[phone]. She does not have a lot of support at home. She was supposed to be getting chemo soon. She is unsure of when she can resume chemo. She needs assistance with coordinating appointments.     Patient does not have transportation through [employer] but [facility] care manager thinks she has another transportation service that needs to be set up in advance. She is unsure the company.     Patients [facility] will contact [facility] for the patient.     Addendum 1417: Patient gave  permission to set up PCP appointment and oncology. [facility] [place] and prefers Tuesdays. Patient uses [facility] for transport.     Primary Care Appointment   [facility]- [address] [place] [address]  Phone: [phone]  Date: 7/29/22 at 11:05 am    [facility] Appointment  [address] [address]  Phone: [phone]  Date: 7/26/22 1:15 PM    Please contact the SW on the floor in which the patient is on for further needs or questions.     Addendum 4569:  updated patient with appointment times      KIARA Carrasco, [facility]  Emergency Room   [phone]    Carly Murray

## 2022-07-19 NOTE — PLAN OF CARE
Goal Outcome Evaluation:    Pt A/Ox4, VSS on RA. Denies pain at this time. Up independently in room. Ambulating to BR, voiding appropriately. COVID swab negative. Cardiac monitoring d/t elevated troponin, to be rechecked this AM. CT/brain MRI completed today, view in results. CTM and provide supportive cares.    Vital signs:  Temp: 98.3  F (36.8  C) Temp src: Temporal BP: 111/74 Pulse: 75   Resp: 13 SpO2: 95 % O2 Device: None (Room air)

## 2022-07-19 NOTE — PLAN OF CARE
A&Ox4. VSS on RA. Up in room independently. Voiding via BR. Tele SR. Denies pain/chest pain. Echo completed. Lexiscan completed. Tolerating diet. Discharge instructions discussed with patient. Personal belongings with patient. Discharging to home via private transportation.

## 2022-07-19 NOTE — ED NOTES
Called from MD Cisneros around 1710 to change discharge paperwork but patient had already left. Wanted to change the dizziness diagnosis to ataxia. Could have reprinted but patient was already out of building. Will print the new copy and mail to address on file

## 2022-07-19 NOTE — H&P
[facility]    History and Physical - Hospitalist Service       Date of Admission:  7/18/2022    Assessment & Plan      Yaneli Ramey is a 72 year old female with history of supraventricular tachycardia, neoplasm of fallopian tube, salpingo-oophorectomy, hysterectomy, GERD, asthma, and posterior vitreous detachment. Chart review suggests history of AFIB but patient states that this is inaccurate. She presented to the [facility] emergency department today (7/18/2022) for evaluation of elevated blood pressure and dizziness.  At 11:00 this morning she had onset of difficulty walking and dizziness.  She has had similar symptoms in the past when coming off of her postchemotherapy steroid burst but has not had chemotherapy for about 3 months.  She took her blood pressure and it went from a systolic pressure of 107 to 175.  She reported some gastric pain that radiated up towards her chest.  She reported that her dose of flecainide was recently increased to twice daily.  Emergency department evaluation showed stable vital signs.  Laboratory evaluation showed high-sensitivity troponin of 60 but was otherwise unremarkable including comprehensive metabolic panel, lipase, CBC, and urine analysis.  Chest x-ray showed hyperinflated lungs but was otherwise unremarkable.  CT of head and CTA of head and neck showed no acute process.    Problem list:    Dizziness  Ataxia  Consider idiopathic  Consider TIA  Evaluate for stroke  -Admit to observation  -Monitor on telemetry  -Was given aspirin 324 mg in the ED.  Continue 81 mg daily for now  -Obtain MRI brain to evaluate for possible stroke  -Recently had echo on 7/14/22 that showed EF 55-60% and no significant abnormalities.   -Limited echo to assess for WMA (see below).    Epigastric and chest discomfort  Troponin elevation  -Sounds atypical for pain related to coronary artery disease but with elevated troponin will evaluate further  -Monitor  on telemetry  -Trend troponins  -Limited echocardiogram tomorrow morning to assess for wall motion abnormality  -If troponins rise significantly, there is significant abnormality on echocardiogram, or symptoms are recurrent consult cardiology  -If troponins are flat or downward trending and echocardiogram is unremarkable and symptoms do not recur consider Lexiscan stress test as inpatient versus outpatient.    Paroxysmal supraventricular tachycardia  -Resume prior to admission flecainide    Fallopian tube carcinoma  -Had been on chemotherapy up to 3 months ago  -Was to possibly resume chemotherapy tomorrow  -Follow-up with oncology as previously planned after discharge    Asthma, without acute exacerbation       Diet:  Regular diet  DVT Prophylaxis: Pneumatic Compression Devices  [place] Catheter: Not present  Central Lines: None  Cardiac Monitoring: None  Code Status:  Full code    Disposition Plan      Admit to observation.  Anticipate discharge home once medically appropriate.  The patient's care was discussed with the Patient.    Victor Hugo Crawford MD  Hospitalist Service  [facility]  Securely message with the Vocera Web Console (learn more here)  Text page via [employer] Paging/Directory         ______________________________________________________________________    Chief Complaint   Elevated blood pressure, dizziness, epigastric discomfort, and chest discomfort    History is obtained from the patient, Dr. Hutchinson, and the medical record    History of Present Illness   Yaneli Ramey is a 72 year old female with history of supraventricular tachycardia, neoplasm of fallopian tube, salpingo-oophorectomy, hysterectomy, GERD, asthma, and posterior vitreous detachment. Chart review suggests history of AFIB but patient states that this is inaccurate. She presented to the [facility] emergency department today (7/18/2022) for evaluation of elevated blood pressure and dizziness.  At  11:00 this morning she had onset of difficulty walking and dizziness.  She has had similar symptoms in the past when coming off of her postchemotherapy steroid burst but has not had chemotherapy for about 3 months.  She took her blood pressure and it went from a systolic pressure of 107 to 175.  She reported some gastric pain that radiated up towards her chest.  She reported that her dose of flecainide was recently increased to twice daily.  Emergency department evaluation showed stable vital signs.  Laboratory evaluation showed high-sensitivity troponin of 60 but was otherwise unremarkable including comprehensive metabolic panel, lipase, CBC, and urine analysis.  Chest x-ray showed hyperinflated lungs but was otherwise unremarkable.  CT of head and CTA of head and neck showed no acute process.    Review of Systems    The 10 point Review of Systems is negative other than noted in the HPI or here.     Past Medical History    I have reviewed this patient's medical history and updated it with pertinent information if needed.   Past Medical History:   Diagnosis Date     Arthritis      Asthma     Hx of, no inhalers at home for over 20 years     Complication of anesthesia      Gastro-oesophageal reflux disease      Hemorrhoid      Insomnia      Nasal polyp      Neoplasm of fallopian tube      Nuclear sclerosis      Paresthesias      Paroxysmal atrial fibrillation (H)      Paroxysmal supraventricular tachycardia (H)      Pneumonia      Posterior vitreous detachment     bilateral - left 2012, right 2020     SVT (supraventricular tachycardia) (H)        Past Surgical History   I have reviewed this patient's surgical history and updated it with pertinent information if needed.  Past Surgical History:   Procedure Laterality Date     BREAST SURGERY      breast biopsy     BUNIONECTOMY       DAVINCI LYSIS OF ADHESIONS N/A 6/9/2022    Procedure: Davinci Lysis of Adhesions;  Surgeon: Cheryl Martinez MD;  Location:  OR      DAVINCI SALPINGO-OOPHORECTOMY INCLUDING BILATERAL Bilateral 2022    Procedure: BILATERAL SALPINGO OOPHORECTOMY ; OMENTECTOMY ; MULTIPLE PERITONEAL BIOPSIES, BILATERAL URETEROLYSIS, WASHINGS, LYSIS OF ADHESIONS;  Surgeon: Cheryl Martinez MD;  Location:  OR     DISCECTOMY LUMBAR MINIMALLY INVASIVE TWO LEVELS       ENDOSCOPIC SINUS SURGERY       HYSTERECTOMY       LAPAROSCOPY DIAGNOSTIC (GYN) N/A 2021    Procedure: DIAGNOSTIC LAPAROSCOPY;  Surgeon: Cheryl Martinez MD;  Location:  OR     REPAIR HAMMER TOE       REPAIR LIGAMENT ULNAR COLLATERAL (ELBOW)       REPAIR PTOSIS BILATERAL  2013    Procedure: REPAIR PTOSIS BILATERAL;  right upper lid ptosis repair and bilateral upper eyelid mechanical ptosis repair;  Surgeon: Santo Choudhury MD;  Location:  SD     URETEROLYSIS Bilateral 2022    Procedure: Ureterolysis;  Surgeon: Cheryl Martinez MD;  Location:  OR       Social History   I have reviewed this patient's social history and updated it with pertinent information if needed.  Social History     Tobacco Use     Smoking status: Former Smoker     Years: 20.00     Types: Cigarettes     Quit date:      Years since quittin.5     Smokeless tobacco: Never Used     Tobacco comment: quit    Vaping Use     Vaping Use: Never used   Substance Use Topics     Alcohol use: Not Currently     Comment: min.     Drug use: No     Comment: tried THC, now discontinued       Family History   I have reviewed this patient's family history and updated it with pertinent information if needed.  Family History   Problem Relation Age of Onset     Emphysema Father      Coronary Artery Disease Early Onset Father      Prostate Cancer Father      Lupus Mother        Prior to Admission Medications   Prior to Admission Medications   Prescriptions Last Dose Informant Patient Reported? Taking?   B Complex-C (VITAMIN B COMPLEX W/VITAMIN C) TABS tablet  Self Yes No   Sig: Take 1 tablet by mouth  four times a week   Carboxymethylcell-Hypromellose (GENTEAL) 0.25-0.3 % GEL  Self Yes No   Sig: Place 1 drop into both eyes every evening   Carboxymethylcellulose Sodium (THERATEARS) 0.25 % SOLN  Self Yes No   Sig: Place 1 drop into both eyes 4 times daily as needed   Flaxseed, Linseed, (FLAX SEEDS) POWD  Self Yes No   Sig: Take 1 Tablespoonful by mouth every morning   OVER-THE-COUNTER  Self Yes No   Sig: Place 1 drop into both eyes daily as needed Medication Name: Hylo-Forte Oph Soln   Oat Bran Soluble POWD  Self Yes No   Sig: Take 1 Tablespoonful by mouth daily   Probiotic Product (PROBIOTIC DAILY PO)  Self Yes No   Sig: Take 1 capsule by mouth daily   Vitamin D (Cholecalciferol) 25 MCG (1000 UT) TABS  Self Yes No   Sig: Take 1 tablet by mouth four times a week   alpha-d-galactosidase (BEANO) tablet  Self Yes No   Sig: Take 1 tablet by mouth daily as needed for intestinal gas   carboxymethylcellulose PF (REFRESH PLUS) 0.5 % ophthalmic solution  Self Yes No   Sig: Place 1 drop into both eyes every evening   clindamycin (CLEOCIN T) 1 % external solution  Self Yes No   Sig: Apply topically daily as needed (ACNE/CUTS)   docusate sodium (COLACE) 100 MG capsule  Self Yes No   Sig: Take 100 mg by mouth daily as needed for constipation   flecainide (TAMBOCOR) 50 MG tablet  Self No No   Sig: Take 100 mg ( 2 tabs) twice daily   fluticasone (FLONASE) 50 MCG/ACT nasal spray  Self Yes No   Sig: Spray 1 spray into both nostrils daily   lactase (LACTAID) 3000 UNIT tablet  Self Yes No   Sig: Take 3,000 Units by mouth daily as needed for indigestion   lidocaine-prilocaine (EMLA) 2.5-2.5 % external cream  Self Yes No   Sig: Apply topically as needed (1 HOUR PRIOR TO PORT DRAINS/INFUSION)   methylcellulose (CITRUCEL) powder  Self Yes No   Sig: Take 1.5 teaspoonful by mouth every morning   multivitamin, therapeutic (THERA-VIT) TABS tablet  Self Yes No   Sig: Take 1 tablet by mouth three times a week    polyethylene glycol (MIRALAX)  17 GM/Dose powder  Self Yes No   Sig: Take 1 capful by mouth daily   polyethylene glycol-propylene glycol (SYSTANE ULTRA) 0.4-0.3 % SOLN ophthalmic solution  Self Yes No   Sig: Place 1 drop into both eyes 4 times daily as needed for dry eyes   polyethylene glycol-propylene glycol PF (SYSTANE HYDRATION PF) 0.4-0.3 % SOLN opthalmic solution  Self Yes No   Sig: Place 1 drop into both eyes 4 times daily as needed for dry eyes   simethicone (MYLICON) 125 MG chewable tablet  Self Yes No   Sig: Take 125 mg by mouth daily as needed for intestinal gas      Facility-Administered Medications: None     Allergies   Allergies   Allergen Reactions     Adhesive Tape      Cromolyn      Estradiol      Hydrocodone Other (See Comments)     Other Drug Allergy (See Comments)      Percodan       Paclitaxel      Percocet [Oxycodone-Acetaminophen]      Septra [Sulfamethoxazole W-Trimethoprim]        Physical Exam   Vital Signs: Temp: 98.3  F (36.8  C) Temp src: Temporal BP: (!) 150/87 Pulse: 73   Resp: 15 SpO2: 97 % O2 Device: None (Room air)    Weight: 144 lbs 12.8 oz    GENERAL: Pleasant and cooperative. No acute distress.  EYES: Pupils equal and round. No scleral erythema or icterus.  ENT: External ears are normal without deformity. Posterior oropharynx is without erythem, swelling, or exudate.  NECK: Supple. No masses or swelling. No tenderness. Thyroid is normal without mass or tenderness.  CHEST: Clear to auscultation. Normal breath sounds. No retractions.   CV: Regular rate and rhythm. No JVD. Pulses normal.  ABDOMEN: Bowel sounds present. No tenderness. No masses or hernia.  EXTREMETIES: No clubbing, cyanosis, or ischemia.  SKIN: Warm and dry to touch. No wounds or rashes.  NEUROLOGIC: Strength and sensation are normal.  No focal deficit        Data   Data reviewed today: I reviewed all medications, new labs and imaging results over the last 24 hours.     Recent Labs   Lab 07/18/22  1835   WBC 6.0   HGB 13.2   MCV 97      NA  141   POTASSIUM 4.0   CHLORIDE 107   CO2 28   BUN 17   CR 0.74   ANIONGAP 6   HOLLY 9.3   GLC 95   ALBUMIN 3.8   PROTTOTAL 6.7*   BILITOTAL 0.7   ALKPHOS 60   ALT 32   AST 19   LIPASE 169     Recent Results (from the past 24 hour(s))   CT Head w/o Contrast    Narrative    EXAM: CT HEAD W/O CONTRAST  LOCATION: [facility]  DATE/TIME: 7/18/2022 7:41 PM    INDICATION: Acute ataxia earlier today, gynecologic cancer  COMPARISON: Brain MRI 02/09/2022..  TECHNIQUE: Routine CT Head without IV contrast. Multiplanar reformats. Dose reduction techniques were used.    FINDINGS:  INTRACRANIAL CONTENTS: No intracranial hemorrhage, extraaxial collection, or mass effect.  Note is made of a low sensitivity of noncontrast CT for detecting intracranial metastatic disease. No CT evidence of acute infarct. Mild presumed chronic small   vessel ischemic changes. Mild generalized volume loss. No hydrocephalus. The sella is unremarkable for technique. The cerebellar tonsils are appropriately positioned.     VISUALIZED ORBITS/SINUSES/MASTOIDS: No intraorbital abnormality. No paranasal sinus mucosal disease. No middle ear or mastoid effusion.    BONES/SOFT TISSUES: No scalp hematoma. No skull fracture.      Impression    IMPRESSION:  1.  No evidence of an acute intracranial abnormality.  2.  Mild age-related change.   CTA Head Neck w Contrast    Narrative    EXAM: CTA HEAD NECK W CONTRAST  LOCATION: [facility]  DATE/TIME: 7/18/2022 7:40 PM    INDICATION: acute ataxia earlier today, gynecologic cancer  COMPARISON: Same-day head CT.  CONTRAST: 68mL Isovue 370  TECHNIQUE: Head and neck CT angiogram with IV contrast. Axial helical CT images of the head and neck vessels obtained during the arterial phase of intravenous contrast administration. Axial 2D reconstructed images and multiplanar 3D MIP reconstructed   images of the head and neck vessels were performed by the technologist. Dose reduction  techniques were used. All stenosis measurements made according to NASCET criteria unless otherwise specified.    FINDINGS:   HEAD CTA:  ANTERIOR CIRCULATION: No stenosis/occlusion, aneurysm, or high flow vascular malformation. Standard [place] of [place] anatomy.    POSTERIOR CIRCULATION: No stenosis/occlusion, aneurysm, or high flow vascular malformation. Balanced vertebral arteries supply a normal basilar artery.     DURAL VENOUS SINUSES: Expected enhancement of the major dural venous sinuses.    NECK CTA:  RIGHT CAROTID: No measurable stenosis or dissection.    LEFT CAROTID: No measurable stenosis or dissection.    VERTEBRAL ARTERIES: No focal stenosis or dissection. Balanced vertebral arteries.    AORTIC ARCH: There is common origin of the right brachiocephalic artery and left common carotid artery.    NONVASCULAR STRUCTURES: There is a 0.3 cm low-density nodule in the right thyroid lobe. Moderate spinal canal stenosis at C5-C6 and C6-C7.      Impression    IMPRESSION:   HEAD CTA:   1.  Patent intracranial arterial vasculature without flow-limiting stenosis.    2.  No aneurysm.    NECK CTA:  1.  Patent cervical arterial vasculature without flow-limiting stenosis.    2.  There is possibly moderate spinal canal stenosis at C5-C6 and C6-C7.    XR Chest 2 Views    Narrative    EXAM: XR CHEST 2 VW  LOCATION: [facility]  DATE/TIME: 7/18/2022 9:17 PM    INDICATION: chest pain, no fever cough, known gyn cancer  COMPARISON: 3/6/2019      Impression    IMPRESSION: Right Mediport in the mid SVC. Heart normal in size. Lungs are hyperinflated compatible with COPD. Calcified granuloma left lower lung, unchanged. Lungs are otherwise clear.

## 2022-07-19 NOTE — ED NOTES
Pt requesting for disk of results for her scans (MRI and CT). Writer calls to CT and spoke with CT tech who states she will make a disk of the results once the results are ready.

## 2022-07-19 NOTE — PROGRESS NOTES
Care Management Follow Up    Additional Information:  SW met with patient at bedside to discuss SHEN. Patient reports she is aware of observation status. SW discussed billing and SNF requirements. Patient requested SHEN letter be left in room and she will read then sign.     SW/ CC will need to get signed SHEN from patient.     Addendum 1416: Patient reports she filled out the MOON and gave to FELY Murray

## 2022-07-19 NOTE — PROVIDER NOTIFICATION
MD Notification    Notified Person: MD    Notified Person Name:  Amelia    Notification Date/Time: 7/19/2022 7786    Notification Interaction: Vocera page    Purpose of Notification: Troponin this AM is 96. No complaints of chest pain. Please advise.    Orders Received: Per MD, will await echo results.    Comments:

## 2022-09-19 NOTE — PROGRESS NOTES
New Patient Hematology / Oncology Nurse Navigator Note     Referral Date: 9/19/22    Referring provider: Self-referral    Referring Clinic/Organization: Self Referred     Referred to: [facility]    Requested provider (if applicable): First available - did not specify     Evaluation for : 2nd opinion from [facility], ovarian cancer     Clinical History (per Nurse review of records provided):      Currently with [facility] -- BOOKMARKED        6/9/22 Surgical path    9/16 CT CAP () showing:  IMPRESSION:     1. Slightly delayed right renal nephrogram with mild stranding of the perinephric fat with new moderate right hydroureteronephrosis to the level of the distal ureter in the pelvis without obvious distal obstructing calculus or mass is worrisome for an obstructing malignant stricture. Consider urology referral.   2. New 1.9 x 1.4 cm nodular focus abutting the anterior margin of the rectum is indeterminate for a new serosal implant, loop of bowel or displaced left ovary.   3. Resolution of the omental fat stranding of the left lower quadrant. However, there are persistent small subcentimeter residual nodular foci within the anterior omentum of the left midabdomen likely representing serosal implants.   4. New mild abdominal ascites may be seen with peritoneal carcinomatosis.      PET scheduled 9/27  Clinical Assessment / Barriers to Care (Per Nurse):  Pt lives in [place]    Records Location: Epic/ [facility] (scanned)    Records Needed:   Imaging from      Additional testing needed prior to consult:   PET scheduled 9/27    Referral updates and Plan:   OUTGOING CALL to pt, no answer.     LVM introducing my role as nurse navigator with [facility] and that we have recd the self-referral requesting 2nd opinion.     Explained to pt that he/she will receive a call from our scheduling intake team and provided our call-back number below if needed.     Magnolia Giles, BSN, RN, PHN, OCN  Hematology/Oncology  Nurse Navigalex [facility]  [phone]

## 2022-09-23 NOTE — TELEPHONE ENCOUNTER
Action 2022 2:12 PM ABT   Action Taken Slides from [facility] received and taken to 5th floor path lab for review     Action 2022 9:24 AM ABT   Action Taken Images from  received and resolved to PACS     RECORDS STATUS - ALL OTHER DIAGNOSIS      RECORDS RECEIVED FROM: [facility], [facility],    DATE RECEIVED: 22   NOTES STATUS DETAILS   OFFICE NOTE from referring provider Self    OFFICE NOTE from medical oncologist External: [facility] 22: Dr. Ewa Martinez   DISCHARGE SUMMARY from hospital [facility] 22, 21: [facility]   DISCHARGE REPORT from the ER [facility] 22: [facility] ED   OPERATIVE REPORT [facility] 22: BILATERAL SALPINGO OOPHORECTOMY ; OMENTECTOMY ; MULTIPLE PERITONEAL BIOPSIES, BILATERAL URETEROLYSIS, WASHINGS, LYSIS OF ADHESIONS  21: DIAGNOSTIC LAPAROSCOPY   MEDICATION LIST External: [facility]    LABS     PATHOLOGY REPORTS Req - [facility]  [employer] [facility]:  22: [id number] (nongyn cyto)  22: [id number] (surg path)    [facility]:  21: [id number]   ANYTHING RELATED TO DIAGNOSIS External: [facility] Most recent 22   GENONOMIC TESTING     TYPE: [facility] 22: [id number]  G1   IMAGING (NEED IMAGES & REPORT)     CT SCANS PACS PACS:  21: CT Abd Pel    HP:  22: CT Chest Abd Pel  21: CT Abd   XRAYS PACS HP:  21: XR Abd

## 2022-09-25 NOTE — ED TRIAGE NOTES
Pt has ovarian cancer and has been bloated, distended and cramping with concerns for an ileus since Thursday. Pt states she is physically and mentally exhausted from not being able to eat from feeling so bloated.      Triage Assessment     Row Name 09/25/22 2912       Triage Assessment (Adult)    Airway WDL WDL       Respiratory WDL    Respiratory WDL WDL       Skin Circulation/Temperature WDL    Skin Circulation/Temperature WDL WDL       Cardiac WDL    Cardiac WDL WDL       Peripheral/Neurovascular WDL    Peripheral Neurovascular WDL WDL       Cognitive/Neuro/Behavioral WDL    Cognitive/Neuro/Behavioral WDL WDL       Leo Coma Scale    Best Eye Response 4-->(E4) spontaneous    Best Motor Response 6-->(M6) obeys commands    Best Verbal Response 5-->(V5) oriented    Leo Coma Scale Score 15

## 2022-09-25 NOTE — ED PROVIDER NOTES
"  History   Chief Complaint:  Bloated and Abdominal Pain       The history is provided by the patient.      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 The patient reports that she has been experiencing severe abdominal pain distention for the last 4 days, in which it has been affecting her ability to eat. She had a CT on the 6-7 and a PET scan on the 14, in which her imaging reveals some ascites. She has been experiencing extreme gas additionally. The patients oncologist is at [facility] who wants her to get a specialized bowel study done. She notes that she has experienced this type of \"inflation before\" but it resolved on its own. The patient endorses mild RLQ pain that is 2/10.     Review of Systems   Constitutional: Positive for appetite change.   Gastrointestinal: Positive for abdominal distention. Negative for abdominal pain (\"discomfort\").   Psychiatric/Behavioral: Positive for sleep disturbance.   All other systems reviewed and are negative.      Allergies:  Sulfa Drugs  Adhesive Tape  Cromolyn Sodium  Estradiol  Hydrocodone  Oxycodone  Paclitaxel  Percocet [Oxycodone-Acetaminophen]  Septra [Sulfamethoxazole W-Trimethoprim]    Medications:  Tylenol #3  Beano  Genteal  Colace  Allegra  Tambocor  Flonase  Lactacid  Amla  Citrucel  Miralax  Mylicon  Cholecalciferol      Past Medical History:     Tachycardia  Carcinomatosis  Primary malignant neoplasm of fallopian tube  Pelvic floor dysfunction  Dizziness   Ataxia  Hemorrhoid  Nasal polyp  Nuclear sclerosis, bilateral  Nevus of iris of left eye  Left left paresthesias  SVT  Heart palpitation  Osteoarthritis of the hip, R>L  PVD  Osteoarthritis of knee  Osteopenia  Dry eyes  Insomnia  Pathological fracture  Hallux valgus  Asthma  Allergic rhinitis  Congential pes " planus  Paroxysmal atrial fibrillation  PAC  Lumbar radiculopathy  Esophageal reflux  Benign neoplasm of eyeball  Cervicalgia  Seasonal affective disorder      Past Surgical History:    Past Surgical History:   Procedure Laterality Date     BREAST SURGERY      breast biopsy     BUNIONECTOMY       DAVINCI LYSIS OF ADHESIONS N/A 6/9/2022    Procedure: Davinci Lysis of Adhesions;  Surgeon: Cheryl Martinez MD;  Location:  OR     DAVINCI SALPINGO-OOPHORECTOMY INCLUDING BILATERAL Bilateral 6/9/2022    Procedure: BILATERAL SALPINGO OOPHORECTOMY ; OMENTECTOMY ; MULTIPLE PERITONEAL BIOPSIES, BILATERAL URETEROLYSIS, WASHINGS, LYSIS OF ADHESIONS;  Surgeon: Cheryl Martinez MD;  Location:  OR     DISCECTOMY LUMBAR MINIMALLY INVASIVE TWO LEVELS       ENDOSCOPIC SINUS SURGERY       HYSTERECTOMY       LAPAROSCOPY DIAGNOSTIC (GYN) N/A 12/9/2021    Procedure: DIAGNOSTIC LAPAROSCOPY;  Surgeon: Cheryl Martinez MD;  Location:  OR     REPAIR HAMMER TOE       REPAIR LIGAMENT ULNAR COLLATERAL (ELBOW)       REPAIR PTOSIS BILATERAL  12/23/2013    Procedure: REPAIR PTOSIS BILATERAL;  right upper lid ptosis repair and bilateral upper eyelid mechanical ptosis repair;  Surgeon: Santo Choudhury MD;  Location:  [facility]     URETEROLYSIS Bilateral 6/9/2022    Procedure: Ureterolysis;  Surgeon: Cheryl Martinez MD;  Location:  OR        Family History:    Father: emphysema, CAD (early onset), prostate & colon cancer, hearing loss  Mother: lupus, cerebrovascular disease, type II diabetes, hyperlipidemia, hypertension, retinal detachment  Brother 2: asthma, Wilm's tumor  Brother 3: ADHD    Social History:  The patient presents alone.   The patient arrived in a private vehicle.   [facility]: [facility]     Physical Exam       Physical Exam    Patient Vitals for the past 24 hrs:   BP Temp Temp src Pulse Resp SpO2 Height Weight   09/25/22 1910 108/69 98  F (36.7  C) Oral 78 -- 93 % -- --   09/25/22 1452  "(!) 148/74 98.1  F (36.7  C) Oral 90 18 96 % 1.791 m (5' 10.5\") 59.9 kg (132 lb)       General: Alert and Interactive.   Head: No signs of trauma.   Mouth/Throat: Oropharynx is clear and moist.   Eyes: Conjunctivae are normal. Pupils are equal, round, and reactive to light.   Neck: Normal range of motion. No nuchal rigidity.   CV: Normal rate and regular rhythm.    Resp: Effort normal and breath sounds normal. No respiratory distress.   GI: Slightly distended abdomen. Light RLQ tenderness.   MSK: Normal range of motion. no edema. Port to the right upper chest.   Neuro: The patient is alert and oriented to person, place, and time.  PERRLA, EOMI, strength in upper/lower extremities normal and symmetrical.   Sensation normal. Speech normal.  GCS eye subscore is 4. GCS verbal subscore is 5. GCS motor subscore is 6.   Skin: Skin is warm and dry. No rash noted.   Psych: normal mood and affect. behavior is normal.       Emergency Department Course   ECG  ECG results from 07/18/22   EKG 12-lead, tracing only     Value    Systolic Blood Pressure     Diastolic Blood Pressure     Ventricular Rate 80    Atrial Rate 80    WI Interval 194    QRS Duration 106        QTc 461    P Axis 69    R AXIS 39    T Axis 55    Interpretation ECG      Sinus rhythm  Possible Left atrial enlargement  Incomplete left bundle branch block  Borderline ECG  When compared with ECG of 10-EDEN-2022 01:03,  WI interval has decreased  Incomplete left bundle branch block has replaced Non-specific intra-ventricular conduction delay  Confirmed by - EMERGENCY ROOM, PHYSICIAN ([id number]),  NÉSTOR DUBON ([id number]) on 7/19/2022 7:58:23 AM         Imaging:  CT Abdomen Pelvis w Contrast   Final Result   IMPRESSION:    1.  Since 09/14/2022, the prior seen dilatation of the right urinary collecting tract has decreased. The amount of abdominal and pelvic ascites has moderately increased. The amount of stranding involving the fat of the mesentery and omentum has " increased    most typical for progression of carcinomatosis. There is new mild contraction of the gallbladder with no evidence for akbar cholecystitis however. Otherwise, the abdomen and pelvis are stable.      XR Abdomen Flat and Decub   Final Result   IMPRESSION: Negative abdomen. Bowel gas pattern is normal. Nothing for obstruction or free air. No evidence for renal stones.        Report per radiology    Laboratory:  Labs Ordered and Resulted from Time of ED Arrival to Time of ED Departure   COMPREHENSIVE METABOLIC PANEL - Abnormal       Result Value    Sodium 136      Potassium 3.6      Chloride 105      Carbon Dioxide (CO2) 21      Anion Gap 10      Urea Nitrogen 21      Creatinine 0.74      Calcium 8.9      Glucose 127 (*)     Alkaline Phosphatase 52      AST 22      ALT 15      Protein Total 5.7 (*)     Albumin 2.8 (*)     Bilirubin Total 0.3      GFR Estimate 85     CBC WITH PLATELETS AND DIFFERENTIAL - Abnormal    WBC Count 5.3      RBC Count 3.66 (*)     Hemoglobin 11.3 (*)     Hematocrit 34.5 (*)     MCV 94      MCH 30.9      MCHC 32.8      RDW 14.8      Platelet Count 330      % Neutrophils 57      % Lymphocytes 30      % Monocytes 11      % Eosinophils 2      % Basophils 0      % Immature Granulocytes 0      NRBCs per 100 WBC 0      Absolute Neutrophils 3.1      Absolute Lymphocytes 1.6      Absolute Monocytes 0.6      Absolute Eosinophils 0.1      Absolute Basophils 0.0      Absolute Immature Granulocytes 0.0      Absolute NRBCs 0.0     LACTIC ACID WHOLE BLOOD - Normal    Lactic Acid 1.1            Emergency Department Course:           Reviewed:  I reviewed nursing notes, vitals, past medical history and Care Everywhere    Assessments:  1540 I obtained history and examined the patient as noted above.   1701 I rechecked the patient and explained findings.   1721 I rechecked the patient.   1830 I rechecked the patient and explained findings. She is considering whether she wants to go home or stay in the  [facility].   2000 I rechecked the patient and explained findings. I believe that the patient is safe for discharge at this time.     Consults:  1954 I spoke with Dr. Roderick Small, [facility].    Interventions:  Medications   iohexol (OMNIPAQUE) 140 MG/ML solution for oral use 25 mL (has no administration in time range)   lidocaine-prilocaine (EMLA) cream 1 g (1 g Topical Given 9/25/22 1459)   iopamidol (ISOVUE-370) solution 66 mL (66 mLs Intravenous Given 9/25/22 1742)   Saline (60 mLs As instructed Given 9/25/22 1742)       Disposition:  The patient was discharged to home.     Impression & Plan     CMS Diagnoses: None      Medical Decision Making:  This patient presented to the Emergency Department with Abdominal distention.  The differential diagnosis of progression of her carcinomatosis, ascites, appendicitis, Bowel Obstruction, Ulcer, Ischemia, Cholecystitis, Diverticulitis, Pancreatitis, UTI, Pyelonephritis, Enteritis/Colitis, AAA amongst many other etiologies.    It appears the patient's distention is likely secondary to increased ascites and progression of her carcinomatosis.  Patient is not distended enough that emergent paracentesis is needed.  It is not affecting her respirations.  Is not causing a bowel obstruction.    I consulted with [facility].  It is appropriate for the patient follow-up as an outpatient given the current findings.    Diagnosis:    ICD-10-CM    1. Abdominal distention  R14.0    2. Other ascites  R18.8    3. Carcinomatosis (H)  C80.0        Discharge Medications:  New Prescriptions    No medications on file       Scribe Disclosure:  I, Enriqueta Watson, am serving as a scribe at 3:39 PM on 9/25/2022 to document services personally performed by Yaakov Lira MD based on my observations and the provider's statements to me.          Yaakov Lira MD  09/26/22 0043       Yaakov Lira MD  09/26/22 0043

## 2022-09-26 NOTE — NURSING NOTE
"Oncology Rooming Note    September 26, 2022 1:19 PM   Yaneli Ramey is a 72 year old female who presents for:    Chief Complaint   Patient presents with     Oncology Clinic Visit     Primary malignant neoplasm of fallopian tube (H)     Initial Vitals: /78 (BP Location: Right arm, Patient Position: Sitting, Cuff Size: Adult Regular)   Pulse 84   Temp 99  F (37.2  C) (Oral)   Resp 18   Ht 1.791 m (5' 10.51\")   Wt 64.4 kg (142 lb)   LMP  (LMP Unknown)   SpO2 98%   BMI 20.08 kg/m   Estimated body mass index is 20.08 kg/m  as calculated from the following:    Height as of this encounter: 1.791 m (5' 10.51\").    Weight as of this encounter: 64.4 kg (142 lb). Body surface area is 1.79 meters squared.  Mild Pain (2) Comment: Data Unavailable   No LMP recorded (lmp unknown). Patient is postmenopausal.  Allergies reviewed: Yes  Medications reviewed: Yes    Medications: Medication refills not needed today.  Pharmacy name entered into [employer]: [id number] [place] [address]    Clinical concerns: None    Galen Hunt            "

## 2022-09-26 NOTE — LETTER
2022         RE: Yaneli Ramey  [address]  [place] [address]        Dear Colleague,    Thank you for referring your patient, Yaneli Ramey, to the [facility]. Please see a copy of my visit note below.                            Consult Notes on Referred Patient    Date: 2022       Dr. Deidre Quintero MD  No address on file       RE: Yaneli Ramey  : 1950  CHARITY: 2022    Dear Dr. Referred Self:    I had the pleasure of seeing your patient Yaneli Ramey here at the Gynecologic Cancer Clinic at the [facility] on 2022.  As you know she is a very pleasant 72 year old woman with a recent diagnosis of  High grade serous carcinoma of ovary.  Given these findings she was subsequently sent to the Gynecologic Cancer Clinic for new patient consultation.     HPI  2021:  410  Neoadjuvant chemotherapy x 6 (T/C then got abraxane/carbox3) due to reaction to taxol    22 : robotic staging  A.  Urinary bladder, peritoneum, biopsy-  -Positive for malignancy, metastatic carcinoma     B & D Rectouterine pouch, cul-de-sac biopsy #1 & #3  -Positive for malignancy, metastatic carcinoma       C.  Rectouterine pouch, cul-de-sac biopsy #2-  -Endometriosis, reactive mesothelial proliferation and atypical focus suspicious for involvement by metastatic carcinoma.     E.  Omentum, resection-  -Small foci of residual carcinoma with treatment related changes,CRS3 (marked response with minimal residual cancer). (please see comment)     F & G.  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    Chemo #7   chemo #8      Started having bladder symptoms at  that time, CT showed evidence of hydroureter on right side and increased nodularity    9/14/22 CT PET     9/16/22: CT CAP  IMPRESSION:     1. Slightly delayed right renal nephrogram with mild stranding of the perinephric fat with new moderate right hydroureteronephrosis to the level of the distal ureter in the pelvis without obvious distal obstructing calculus or mass is worrisome for an obstructing malignant stricture. Consider urology referral.   2. New 1.9 x 1.4 cm nodular focus abutting the anterior margin of the rectum is indeterminate for a new serosal implant, loop of bowel or displaced left ovary.   3. Resolution of the omental fat stranding of the left lower quadrant. However, there are persistent small subcentimeter residual nodular foci within the anterior omentum of the left midabdomen likely representing serosal implants.   4. New mild abdominal ascites may be seen with peritoneal carcinomatosis.     9/25/22 Presented to [facility] ER with increasing bloating and gas. CT AP at [facility]                                                         IMPRESSION:   1.  Since 09/14/2022, the prior seen dilatation of the right urinary collecting tract has decreased. The amount of abdominal and pelvic ascites has moderately increased. The amount of stranding involving the fat of the mesentery and omentum has increased most typical for progression of carcinomatosis. There is new mild contraction of the gallbladder with no evidence for akbar cholecystitis however. Otherwise, the abdomen and pelvis are stable.    Today: feels pretty weak, gas pressure is still there, was so inflated with gas felt terrible yesterday, could not eat or drink for last 4 days. No energy today. Bowels moving slowly now. Avoiding fiber. Gas+, small bowel movement this morning.       Review of Systems:    I have reviewed the pertinent positive findings in the review of symptoms with the patient.      Past Medical History:    Past Medical History:    Diagnosis Date     Arthritis      Asthma     Hx of, no inhalers at home for over 20 years     Complication of anesthesia      Gastro-oesophageal reflux disease      Hemorrhoid      Insomnia      Nasal polyp      Neoplasm of fallopian tube      Nuclear sclerosis      Paresthesias      Paroxysmal atrial fibrillation (H)      Paroxysmal supraventricular tachycardia (H)      Pneumonia      Posterior vitreous detachment     bilateral - left 2012, right 2020     SVT (supraventricular tachycardia) (H)          Past Surgical History:    Past Surgical History:   Procedure Laterality Date     BREAST SURGERY      breast biopsy     BUNIONECTOMY       DAVINCI LYSIS OF ADHESIONS N/A 6/9/2022    Procedure: Davinci Lysis of Adhesions;  Surgeon: Cheryl Martinez MD;  Location:  OR     DAVINCI SALPINGO-OOPHORECTOMY INCLUDING BILATERAL Bilateral 6/9/2022    Procedure: BILATERAL SALPINGO OOPHORECTOMY ; OMENTECTOMY ; MULTIPLE PERITONEAL BIOPSIES, BILATERAL URETEROLYSIS, WASHINGS, LYSIS OF ADHESIONS;  Surgeon: Cheryl Martinez MD;  Location:  OR     DISCECTOMY LUMBAR MINIMALLY INVASIVE TWO LEVELS       ENDOSCOPIC SINUS SURGERY       HYSTERECTOMY       LAPAROSCOPY DIAGNOSTIC (GYN) N/A 12/9/2021    Procedure: DIAGNOSTIC LAPAROSCOPY;  Surgeon: Cheryl Martinez MD;  Location:  OR     REPAIR HAMMER TOE       REPAIR LIGAMENT ULNAR COLLATERAL (ELBOW)       REPAIR PTOSIS BILATERAL  12/23/2013    Procedure: REPAIR PTOSIS BILATERAL;  right upper lid ptosis repair and bilateral upper eyelid mechanical ptosis repair;  Surgeon: Santo Choudhury MD;  Location:  [facility]     URETEROLYSIS Bilateral 6/9/2022    Procedure: Ureterolysis;  Surgeon: Cheryl Martinez MD;  Location:  OR         Health Maintenance:  Health Maintenance Due   Topic Date Due     DEXA  Never done     COLORECTAL CANCER SCREENING  Never done     HEPATITIS C SCREENING  Never done     LIPID  Never done     MEDICARE ANNUAL WELLNESS VISIT  Never done      "FALL RISK ASSESSMENT  Never done     ZOSTER IMMUNIZATION (1 of 2) 2014     PHQ-2 (once per calendar year)  Never done     INFLUENZA VACCINE (1) 2022            Current Medications:     has a current medication list which includes the following prescription(s): acetaminophen-codeine, alpha-d-galactosidase, vitamin b complex w/vitamin c, genteal, carboxymethylcellulose pf, theratears, clindamycin, docusate sodium, fexofenadine, flax seeds, flecainide, fluticasone, lactase, lidocaine-prilocaine, citrucel, multivitamin, therapeutic, oat bran soluble, OVER-THE-COUNTER, polyethylene glycol, systane hydration pf, probiotic product, simethicone, and vitamin d (cholecalciferol).       Allergies:     Adhesive tape, Cromolyn, Estradiol, Hydrocodone, Other drug allergy (see comments), Paclitaxel, Percocet [oxycodone-acetaminophen], and Septra [sulfamethoxazole w-trimethoprim]        Social History:     Social History     Tobacco Use     Smoking status: Former Smoker     Years: 20.00     Types: Cigarettes     Quit date:      Years since quittin.     Smokeless tobacco: Never Used     Tobacco comment: quit    Substance Use Topics     Alcohol use: Not Currently     Comment: min.       History   Drug Use No     Comment: tried THC, now discontinued           Family History:     The patient's family history is notable for the following.    Family History   Problem Relation Age of Onset     Emphysema Father      Coronary Artery Disease Early Onset Father      Prostate Cancer Father      Lupus Mother          Physical Exam:     /78 (BP Location: Right arm, Patient Position: Sitting, Cuff Size: Adult Regular)   Pulse 84   Temp 99  F (37.2  C) (Oral)   Resp 18   Ht 1.791 m (5' 10.51\")   Wt 64.4 kg (142 lb)   LMP  (LMP Unknown)   SpO2 98%   BMI 20.08 kg/m    Body mass index is 20.08 kg/m .    General Appearance: healthy and alert, no distress      PS=1    Assessment:    Yaneli CLINT Ramey is a 72 year " old woman with a new diagnosis of platinum resistant ovarian cancer.         Plan:     1.)    Recommend Gemzar/avastin or cytoxan/avastin for platinum resistant ovarian cancer. Consent for Nanoovary today. Discussed risk of avastin including HTN, bowel perforation.     2.) Genetic risk factors were assessed and the patient is negative for BRCA , had foundation one testing but no actional mutations currently.         Thank you for allowing us to participate in the care of your patient.         Sincerely,    Shelbi Lr MD    Department of Ob/Gyn and Women's Health  Division of Gynecologic Oncology  [facility]  [phone]    Of a 35 *minute appointment, more than 50% was spent in counseling the patient.      CC  Patient Care Team:  [facility], [facility] as PCP - José Miguel Torres MD as Assigned Heart and Vascular Provider  Shelbi Lr MD as MD (Gynecologic Oncology)

## 2022-09-26 NOTE — PROGRESS NOTES
Consult Notes on Referred Patient    Date: 2022       Dr. Deidre Quintero MD  No address on file       RE: Yaneli Ramey  : 1950  CHARITY: 2022    Dear Dr. Referred Self:    I had the pleasure of seeing your patient Yaneli Ramey here at the Gynecologic Cancer Clinic at the [facility] on 2022.  As you know she is a very pleasant 72 year old woman with a recent diagnosis of  High grade serous carcinoma of ovary.  Given these findings she was subsequently sent to the Gynecologic Cancer Clinic for new patient consultation.     HPI  2021:  410  Neoadjuvant chemotherapy x 6 (T/C then got abraxane/carbox3) due to reaction to taxol    22 : robotic staging  A.  Urinary bladder, peritoneum, biopsy-  -Positive for malignancy, metastatic carcinoma     B & D Rectouterine pouch, cul-de-sac biopsy #1 & #3  -Positive for malignancy, metastatic carcinoma       C.  Rectouterine pouch, cul-de-sac biopsy #2-  -Endometriosis, reactive mesothelial proliferation and atypical focus suspicious for involvement by metastatic carcinoma.     E.  Omentum, resection-  -Small foci of residual carcinoma with treatment related changes,CRS3 (marked response with minimal residual cancer). (please see comment)     F & G.  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    Chemo #7   chemo #8      Started having bladder symptoms at that time, CT showed evidence of hydroureter on right side and increased nodularity    22 CT PET     22: CT CAP  IMPRESSION:     1. Slightly delayed right renal nephrogram with mild stranding of the perinephric fat with new moderate  right hydroureteronephrosis to the level of the distal ureter in the pelvis without obvious distal obstructing calculus or mass is worrisome for an obstructing malignant stricture. Consider urology referral.   2. New 1.9 x 1.4 cm nodular focus abutting the anterior margin of the rectum is indeterminate for a new serosal implant, loop of bowel or displaced left ovary.   3. Resolution of the omental fat stranding of the left lower quadrant. However, there are persistent small subcentimeter residual nodular foci within the anterior omentum of the left midabdomen likely representing serosal implants.   4. New mild abdominal ascites may be seen with peritoneal carcinomatosis.     9/25/22 Presented to [place] ER with increasing bloating and gas. CT AP at [facility]                                                         IMPRESSION:   1.  Since 09/14/2022, the prior seen dilatation of the right urinary collecting tract has decreased. The amount of abdominal and pelvic ascites has moderately increased. The amount of stranding involving the fat of the mesentery and omentum has increased most typical for progression of carcinomatosis. There is new mild contraction of the gallbladder with no evidence for akbar cholecystitis however. Otherwise, the abdomen and pelvis are stable.    Today: feels pretty weak, gas pressure is still there, was so inflated with gas felt terrible yesterday, could not eat or drink for last 4 days. No energy today. Bowels moving slowly now. Avoiding fiber. Gas+, small bowel movement this morning.       Review of Systems:    I have reviewed the pertinent positive findings in the review of symptoms with the patient.      Past Medical History:    Past Medical History:   Diagnosis Date     Arthritis      Asthma     Hx of, no inhalers at home for over 20 years     Complication of anesthesia      Gastro-oesophageal reflux disease      Hemorrhoid      Insomnia      Nasal polyp      Neoplasm of fallopian tube       Nuclear sclerosis      Paresthesias      Paroxysmal atrial fibrillation (H)      Paroxysmal supraventricular tachycardia (H)      Pneumonia      Posterior vitreous detachment     bilateral - left 2012, right 2020     SVT (supraventricular tachycardia) (H)          Past Surgical History:    Past Surgical History:   Procedure Laterality Date     BREAST SURGERY      breast biopsy     BUNIONECTOMY       DAVINCI LYSIS OF ADHESIONS N/A 6/9/2022    Procedure: Davinci Lysis of Adhesions;  Surgeon: Cheryl Martinez MD;  Location:  OR     DAVINCI SALPINGO-OOPHORECTOMY INCLUDING BILATERAL Bilateral 6/9/2022    Procedure: BILATERAL SALPINGO OOPHORECTOMY ; OMENTECTOMY ; MULTIPLE PERITONEAL BIOPSIES, BILATERAL URETEROLYSIS, WASHINGS, LYSIS OF ADHESIONS;  Surgeon: Cheryl Martinez MD;  Location:  OR     DISCECTOMY LUMBAR MINIMALLY INVASIVE TWO LEVELS       ENDOSCOPIC SINUS SURGERY       HYSTERECTOMY       LAPAROSCOPY DIAGNOSTIC (GYN) N/A 12/9/2021    Procedure: DIAGNOSTIC LAPAROSCOPY;  Surgeon: Cheryl Martinez MD;  Location:  OR     REPAIR HAMMER TOE       REPAIR LIGAMENT ULNAR COLLATERAL (ELBOW)       REPAIR PTOSIS BILATERAL  12/23/2013    Procedure: REPAIR PTOSIS BILATERAL;  right upper lid ptosis repair and bilateral upper eyelid mechanical ptosis repair;  Surgeon: Santo Choudhury MD;  Location:  [facility]     URETEROLYSIS Bilateral 6/9/2022    Procedure: Ureterolysis;  Surgeon: Cheryl Martinez MD;  Location:  OR         Health Maintenance:  Health Maintenance Due   Topic Date Due     DEXA  Never done     COLORECTAL CANCER SCREENING  Never done     HEPATITIS C SCREENING  Never done     LIPID  Never done     MEDICARE ANNUAL WELLNESS VISIT  Never done     FALL RISK ASSESSMENT  Never done     ZOSTER IMMUNIZATION (1 of 2) 08/07/2014     PHQ-2 (once per calendar year)  Never done     INFLUENZA VACCINE (1) 09/01/2022            Current Medications:     has a current medication list which includes the  "following prescription(s): acetaminophen-codeine, alpha-d-galactosidase, vitamin b complex w/vitamin c, genteal, carboxymethylcellulose pf, theratears, clindamycin, docusate sodium, fexofenadine, flax seeds, flecainide, fluticasone, lactase, lidocaine-prilocaine, citrucel, multivitamin, therapeutic, oat bran soluble, OVER-THE-COUNTER, polyethylene glycol, systane hydration pf, probiotic product, simethicone, and vitamin d (cholecalciferol).       Allergies:     Adhesive tape, Cromolyn, Estradiol, Hydrocodone, Other drug allergy (see comments), Paclitaxel, Percocet [oxycodone-acetaminophen], and Septra [sulfamethoxazole w-trimethoprim]        Social History:     Social History     Tobacco Use     Smoking status: Former Smoker     Years: 20.00     Types: Cigarettes     Quit date:      Years since quittin.     Smokeless tobacco: Never Used     Tobacco comment: quit    Substance Use Topics     Alcohol use: Not Currently     Comment: min.       History   Drug Use No     Comment: tried THC, now discontinued           Family History:     The patient's family history is notable for the following.    Family History   Problem Relation Age of Onset     Emphysema Father      Coronary Artery Disease Early Onset Father      Prostate Cancer Father      Lupus Mother          Physical Exam:     /78 (BP Location: Right arm, Patient Position: Sitting, Cuff Size: Adult Regular)   Pulse 84   Temp 99  F (37.2  C) (Oral)   Resp 18   Ht 1.791 m (5' 10.51\")   Wt 64.4 kg (142 lb)   LMP  (LMP Unknown)   SpO2 98%   BMI 20.08 kg/m    Body mass index is 20.08 kg/m .    General Appearance: healthy and alert, no distress      PS=1    Assessment:    Yaneli Ramey is a 72 year old woman with a new diagnosis of platinum resistant ovarian cancer.         Plan:     1.)    Recommend Gemzar/avastin or cytoxan/avastin for platinum resistant ovarian cancer. Consent for Nanoovary today. Discussed risk of avastin including " HTN, bowel perforation.     2.) Genetic risk factors were assessed and the patient is negative for BRCA , had foundation one testing but no actional mutations currently.         Thank you for allowing us to participate in the care of your patient.         Sincerely,    Shelbi Lr MD    Department of Ob/Gyn and Women's Health  Division of Gynecologic Oncology  [facility]  [phone]    Of a 35 *minute appointment, more than 50% was spent in counseling the patient.      CC  Patient Care Team:  [facility], [facility] as PCP - José Miguel Torres MD as Assigned Heart and Vascular Provider  Shelbi Lr MD as MD (Gynecologic Oncology)  SELF, REFERRED

## 2022-09-28 NOTE — PATIENT INSTRUCTIONS
It was a pleasure seeing you in clinic today-please reach out if there are any further questions that arise following today's visit.  During business hours, you may reach me at [phone].  For urgent/emergent questions after business hours, you may reach the on-call Gynecologic Oncology Resident through the [facility]  at [phone].    All normal test results are usually communicated via letter or [employer] message.  Abnormal results (those that require a change in the previously discussed plan of care) are usually communicated via a phone call.    I recommend signing up for [employer] access if you have not already done so.  This allows you online access to your lab results and also helps you communicate efficiently with your clinic should any questions arise in your care.    Potential study enrollment, follow-up with     Dr amber Betancur RN  Phone:  [phone]  Fax:  [phone]

## 2022-10-07 NOTE — ED PROVIDER NOTES
"  History   Chief Complaint:  Dehydration       HPI   Yaneli Ramey is a 72 year old female with history of ovarian cancer who presents with a distended abdomin. shortness of breath, and unable to eat or drink. She has a paracentesis a week ago and 2.5L was taken out. Since then she has been having a low grade fever and diaphoresis. She wants another paracentesis done today so she can \"make space\" to eat and drink. She denies blood in stool but she has been producing urine and bowel less. Her last chemo was on 8/23/22 and she was supposed to have her second one in 5 days but her proteins levels needs to increase. She follows with [place] [facility] [place]. She denies transfusion in the past.     Review of Systems   Constitutional: Positive for diaphoresis and fever.   Respiratory: Positive for shortness of breath.    Gastrointestinal: Positive for abdominal distention. Negative for blood in stool.   Genitourinary: Negative.    All other systems reviewed and are negative.        Allergies:  Sulfa Drugs  Adhesive Tape  Cromolyn Sodium  Estradiol  Hydrocodone  Oxycodone  Paclitaxel  Percocet [Oxycodone-Acetaminophen]  Septra [Sulfamethoxazole W-Trimethoprim]     Medications:  Tylenol #3  Beano  Genteal  Colace  Allegra  Tambocor  Flonase  Lactacid  Amla  Citrucel  Miralax  Mylicon  Cholecalciferol        Past Medical History:     Tachycardia  Carcinomatosis  Primary malignant neoplasm of fallopian tube  Pelvic floor dysfunction  Dizziness   Ataxia  Hemorrhoid  Nasal polyp  Nuclear sclerosis, bilateral  Nevus of iris of left eye  Left left paresthesias  SVT  Heart palpitation  Osteoarthritis of the hip, R>L  PVD  Osteoarthritis of knee  Osteopenia  Dry eyes  Insomnia  Pathological fracture  Hallux valgus  Asthma  Allergic rhinitis  Congential pes planus  Paroxysmal atrial fibrillation  PAC  Lumbar radiculopathy  Esophageal reflux  Benign neoplasm of eyeball  Cervicalgia  Seasonal affective disorder     Past Surgical " History:    Breast surgery  Bunionectomy  DaVinci lysis of adhesions  DaVinci salpingo-oophorectomy  Discectomy  Hysterectomy  Laparoscopy  Repair hammer toe  Repair elbow  Ureterolysis    Family History:    CAD  Emphysema  Lupus    Social History:  The patient presents to the ED with a family member  PCP: [facility]     Physical Exam     Patient Vitals for the past 24 hrs:   BP Temp Temp src Pulse Resp SpO2 Weight   10/06/22 1901 113/62 97.5  F (36.4  C) Temporal 98 18 98 % 64.1 kg (141 lb 6.4 oz)       Physical Exam    HENT:  mmm  Eyes: periorbital tissue and sclera normal  Neck: supple  CV: ppi, regular   Resp: speaking in full sentences without any resp distress  Abd: Moderate distention, no significant localizing pain  Ext: peripheral edema present:  No  Skin: warm dry well perfused  Neuro: Alert, no gross motor or sensory deficits,  gait stable        Emergency Department Course     Imaging:  POC US ABDOMEN LIMITED   Final Result   PROCEDURE NOTE --> Emergency Bedside Ultrasound      Procedure Name: Limited abdominal ultrasound to evaluate for ascites and perform paracentesis      Preformed by: Brandon Kirk MD      Indication -ascites in setting of known malignancy      Probe:  low frequency curvilinear probe      Windows -transverse and sagittal views left and right lower quadrants      Findings -moderate amount of ascites greater fluid collection of the left lower quadrant relative to the right lower quadrant      Impression -ascites with most accessible fluid pocket in the left lower quadrant      Images saved to PACS protocol           Report per radiology    Laboratory:  Labs Ordered and Resulted from Time of ED Arrival to Time of ED Departure   COMPREHENSIVE METABOLIC PANEL - Abnormal       Result Value    Sodium 136      Potassium 4.6      Chloride 99      Carbon Dioxide (CO2) 25      Anion Gap 12      Urea Nitrogen 24.4 (*)     Creatinine 1.26 (*)     Calcium 8.6 (*)      Glucose 111 (*)     Alkaline Phosphatase 56      AST 41 (*)     ALT 32      Protein Total 5.4 (*)     Albumin 3.0 (*)     Bilirubin Total 0.2      GFR Estimate 45 (*)    CBC WITH PLATELETS AND DIFFERENTIAL - Abnormal    WBC Count 10.2      RBC Count 3.83      Hemoglobin 11.4 (*)     Hematocrit 35.6      MCV 93      MCH 29.8      MCHC 32.0      RDW 14.8      Platelet Count 392      % Neutrophils 65      % Lymphocytes 20      % Monocytes 12      % Eosinophils 1      % Basophils 1      % Immature Granulocytes 1      NRBCs per 100 WBC 0      Absolute Neutrophils 6.7      Absolute Lymphocytes 2.1      Absolute Monocytes 1.2      Absolute Eosinophils 0.1      Absolute Basophils 0.1      Absolute Immature Granulocytes 0.1      Absolute NRBCs 0.0          [facility]    -Paracentesis    Date/Time: 10/6/2022 9:16 PM  Performed by: Brandon Kirk MD  Authorized by: Brandon Kirk MD     Risks, benefits and alternatives discussed.    ED EVALUATION:      Assessment Time: 10/6/2022 9:28 PM      I have performed an Emergency Department Evaluation including taking a history and physical examination, this evaluation will be documented in the electronic medical record for this ED encounter.     Indication: Recurrent ascites due to no malignancy    ASA Class: Class 2- mild systemic disease, no acute problems, no functional limitations    Mallampati: Grade 1- soft palate, uvula, tonsillar pillars, and posterior pharyngeal wall visible    UNIVERSAL PROTOCOL   Site Marked: NA  Prior Images Obtained and Reviewed:  Yes  Required items: Required blood products, implants, devices and special equipment available    Patient identity confirmed:  Verbally with patient and arm band  NA - No sedation, light sedation, or local anesthesia  Confirmation Checklist:  Patient's identity using two indicators  Universal Protocol: the Joint Commission Universal Protocol was followed    Preparation: Patient was  prepped and draped in usual sterile fashion    ESBL (mL):  0    PRE-PROCEDURE DETAILS     Procedure purpose:  Therapeutic    ANESTHESIA (see MAR for exact dosages):     Anesthesia method:  Local infiltration    Local anesthetic:  Lidocaine 1% WITH epi    SEDATION    Patient Sedated: No      PROCEDURE DETAILS     Needle gauge:  20    Ultrasound guidance: yes      Puncture site:  L lower quadrant    Fluid appearance:  Linda    Dressing:  4x4 sterile gauze      PROCEDURE    Patient Tolerance:  Patient tolerated the procedure well with no immediate complications  Length of time physician/provider present for 1:1 monitoring during sedation: 0   Total of 3 L was removed        Emergency Department Course:     Reviewed:  I reviewed nursing notes, vitals, past medical history and Care Everywhere    Assessments:  1929 I obtained history and examined the patient as noted above.     2128 I performed a paracentesis.    2315 I rechecked the patient and explained findings.     Consults:  2002 I spoke with radiology regarding the patient's paracentesis.     Interventions:  2006 Lactated ringers Bolus 1,000 mL IV    2031 Albumin 25 g IV    2045 Lidocaine 1% with Epinephrine 5 mL ID    Disposition:  The patient was discharged to home.     Impression & Plan     Medical Decision Making:  Yaneli Ramey is a 72 year old female with a history of ovarian cancer is here with abdominal distension causing shortness of breath and poor PO intake hoping for a paracentesis. Not febrile and vitals are stable. She was hypovolemic until she was increased with fluid as well as albumin. Will undergo paracentesis and she was discharged home.        Diagnosis:    ICD-10-CM    1. Other ascites  R18.8    2. Primary malignant neoplasm of fallopian tube, unspecified laterality (H)  C57.00      Scribe Disclosure:  Jose Maria JARAMILLO am serving as a scribe at 7:11 PM on 10/6/2022 to document services personally performed by Brandon Kirk,  MD based on my observations and the provider's statements to me.            Brandon Kirk MD  10/07/22 0340

## 2022-10-07 NOTE — ED TRIAGE NOTES
"Ovarian cancer, sent by oncologist. Unable to eat/drink due to bloating and ascites.\"There's no room.\" Has had a paracentesis in past.  Fatigue and lightheaded. Concerned for dehydration. No chest pain, SOB, N/V/D. A/Ox4.      "

## 2022-10-08 PROBLEM — R18.0 MALIGNANT ASCITES (H): Status: ACTIVE | Noted: 2022-01-01

## 2022-10-08 PROBLEM — E86.0 DEHYDRATION: Status: ACTIVE | Noted: 2022-01-01

## 2022-10-08 PROBLEM — R42 LIGHTHEADEDNESS: Status: ACTIVE | Noted: 2022-01-01

## 2022-10-08 PROBLEM — M62.81 GENERALIZED MUSCLE WEAKNESS: Status: ACTIVE | Noted: 2022-01-01

## 2022-10-08 PROBLEM — C56.9 OVARIAN CANCER, UNSPECIFIED LATERALITY (H): Status: ACTIVE | Noted: 2022-01-01

## 2022-10-08 NOTE — ED TRIAGE NOTES
Patient reports she has ovarian cancer that is advanced and she is supposed to be starting a different chemotherapy tx on Tuesday. States she was in ED Last Thursday and had 3L removed from abdomen ascites. States she was then feeling improvement but has since had bloating and unable to eat/drink. States she has had intermittent fevers. None today. Feels fatigued and unable to ambulate or perform normal ADLs.      Triage Assessment     Row Name 10/08/22 1420       Triage Assessment (Adult)    Airway WDL WDL       Respiratory WDL    Respiratory WDL WDL       Skin Circulation/Temperature WDL    Skin Circulation/Temperature WDL X  pale       Cardiac WDL    Cardiac WDL X  tachycardic       Cognitive/Neuro/Behavioral WDL    Cognitive/Neuro/Behavioral WDL WDL

## 2022-10-08 NOTE — ED PROVIDER NOTES
History   Chief Complaint:  Generalized Weakness       The history is provided by the patient and a relative.      Yaneli Ramey is a 72 year old female with history of cancer, a-fib, and ascites who presents with abdominal distention and increasing weakness and fatigue over the past several days. She mentions on Thursday, 2 days ago, she had 3 L of fluid drained from her abdominal ascites. A week prior to that, she had another drain as well. Today, however, she feels that her distention is from gas rather than fluid. She has been unable to eat and stay hydrated due to this, and states that this is problematic due to her starting chemotherapy soon. She mentions feeling nauseated and diaphoretic, with a temperature of 100.0 while at home today. She also has been able to pass gas and had a bowel movement this morning, but her symptoms still persist. She expresses concern that her symptoms are becoming problematic to the point where she can not live independently.    Review of Systems   Constitutional: Positive for appetite change, diaphoresis and fatigue. Negative for fever.   Gastrointestinal: Positive for abdominal distention and nausea.   Neurological: Positive for weakness.   All other systems reviewed and are negative.    Allergies:  Sulfa Drugs  Adhesive Tape  Cromolyn Sodium  Estradiol  Hydrocodone  Oxycodone  Paclitaxel  Percocet [Oxycodone-Acetaminophen]  Septra [Sulfamethoxazole W-Trimethoprim]     Medications  Beano  Genteal  Colace  Allegra  Tambocor  Flonase  Lactacid  Amla  Citrucel  Miralax  Mylicon  Cholecalciferol     Past Medical History:     Tachycardia  Carcinomatosis  Primary malignant neoplasm of fallopian tube  Pelvic floor dysfunction  Dizziness   Ataxia  Hemorrhoid  Nasal polyp  Nuclear sclerosis, bilateral  Nevus of iris of left eye  Left left paresthesias  SVT  Heart palpitation  Osteoarthritis of the hip, R>L  PVD  Osteoarthritis of knee  Osteopenia  Dry eyes  Insomnia  Pathological  "fracture  Hallux valgus  Asthma  Allergic rhinitis  Congential pes planus  Paroxysmal atrial fibrillation  PAC  Lumbar radiculopathy  Esophageal reflux  Benign neoplasm of eyeball  Cervicalgia  Seasonal affective disorder  Ascites     Past Surgical History:    Breast surgery  Bunionectomy  DaVinci lysis of adhesions  DaVinci salpingo-oophorectomy  Discectomy  Hysterectomy  Laparoscopy  Repair hammer toe  Repair elbow  Ureterolysis     Family History:    CAD  Emphysema  Lupus     Social History:  The patient presents to the ED with a family member.  PCP: Yana, [facility]     Physical Exam     Patient Vitals for the past 24 hrs:   BP Temp Temp src Pulse Resp SpO2 Height Weight   10/08/22 1505 -- -- -- 91 -- 97 % -- --   10/08/22 1500 101/72 -- -- -- -- -- -- --   10/08/22 1425 -- 98.2  F (36.8  C) Temporal -- -- -- -- --   10/08/22 1424 -- -- -- -- -- -- 1.778 m (5' 10\") 62.6 kg (138 lb)   10/08/22 1423 100/61 -- -- 103 17 96 % -- --     Physical Exam  General: Thin, appears fatigued  Eyes: PERRL, conjunctivae pink no scleral icterus or conjunctival injection  ENT:  Moist mucus membranes, posterior oropharynx clear without erythema or exudates  Respiratory:  Lungs clear to auscultation bilaterally, no crackles/rubs/wheezes.  Good air movement  CV: Normal rate and rhythm, no murmurs/rubs/gallops  GI:  Abdomen soft and mildly distended. Normoactive BS.  Minimal diffuse tenderness, no guarding or rebound  Skin: Warm, dry.  No rashes or petechiae  Musculoskeletal: No peripheral edema or calf tenderness  Neuro: Alert and oriented to person/place/time. Globally weak without focal deficits, requires assistance to sit and move  Psychiatric: Normal affect      Emergency Department Course   ECG  ECG results from 10/08/22 - Read and Interpreted by Dr. Veronica Paniagua at 1602.   EKG 12-lead, tracing only     Value    Systolic Blood Pressure     Diastolic Blood Pressure     Ventricular Rate 82    Atrial Rate 82    MA " Interval 182    QRS Duration 106        QTc 443    P Axis 49    R AXIS 59    T Axis 57    Interpretation ECG      Sinus rhythm  Normal ECG  When compared with ECG of 19-JUL-2022 13:23,  T wave amplitude has decreased in Lateral leads  Confirmed by GENERATED REPORT, COMPUTER ([id number]),  INDU GREGORY ([id number]) on 10/8/2022 4:31:29 PM         Imaging:  CT Abdomen Pelvis w Contrast   Final Result   IMPRESSION:    1.  Increasing peritoneal metastatic disease/ascites.   2.  No evidence of bowel obstruction.        Report per radiology    Laboratory:  Labs Ordered and Resulted from Time of ED Arrival to Time of ED Departure   COMPREHENSIVE METABOLIC PANEL - Abnormal       Result Value    Sodium 135      Potassium 4.2      Chloride 101      Carbon Dioxide (CO2) 27      Anion Gap 7      Urea Nitrogen 21      Creatinine 0.92      Calcium 8.5      Glucose 105 (*)     Alkaline Phosphatase 45      AST 22      ALT 25      Protein Total 5.1 (*)     Albumin 2.2 (*)     Bilirubin Total 0.4      GFR Estimate 65     CBC WITH PLATELETS AND DIFFERENTIAL - Abnormal    WBC Count 10.4      RBC Count 3.96      Hemoglobin 11.8      Hematocrit 36.8      MCV 93      MCH 29.8      MCHC 32.1      RDW 14.8      Platelet Count 458 (*)     % Neutrophils 69      % Lymphocytes 18      % Monocytes 10      % Eosinophils 1      % Basophils 1      % Immature Granulocytes 1      NRBCs per 100 WBC 0      Absolute Neutrophils 7.2      Absolute Lymphocytes 1.9      Absolute Monocytes 1.1      Absolute Eosinophils 0.1      Absolute Basophils 0.1      Absolute Immature Granulocytes 0.1      Absolute NRBCs 0.0     ISTAT GASES LACTATE VENOUS POCT - Abnormal    Lactic Acid POCT 0.6      Bicarbonate Venous POCT 28      O2 Sat, Venous POCT 57 (*)     pCO2V Venous POCT 44      pH Venous POCT 7.41      pO2 Venous POCT 30     MAGNESIUM - Normal    Magnesium 2.1     LIPASE - Normal    Lipase 123     TROPONIN I - Normal    Troponin I High Sensitivity 13      COVID-19 VIRUS (CORONAVIRUS) BY PCR - Normal    SARS CoV2 PCR Negative     ROUTINE UA WITH MICROSCOPIC REFLEX TO CULTURE      Emergency Department Course:    Reviewed:  I reviewed nursing notes, vitals, past medical history and Care Everywhere    Assessments:  1515 I obtained history and examined the patient as noted above.    I rechecked the patient and explained findings.     Consults:  1827 I spoke with Dr. Middleton from the hospitalist service regarding the patient's presentation, findings here in the ED, and plan of care.     Interventions:  1428 EMLA 1 g TD  1642 Lactated ringers 1 L IV  1649 Iopamidol 65 ml IV   Saline flush 60 ml IV    Disposition:  The patient was admitted to the hospital under the care of Dr. Middleton.     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  Her ascites is not severe today.  She recently underwent paracentesis.  Laboratory studies are all fairly reassuring though she does have hypoalbuminemia likely from her malignancy.  She is afebrile and white count is normal.  No signs of sepsis.  A CT scan was obtained and fortunately no signs of obstruction to account for her symptoms.  She was given IV fluids given concerns about dehydration.  She does not require emergent paracentesis but she will likely require paracentesis soon as she has not been able to go for more than a week without repeat paracentesis.  She may require a drain for palliation.  She lives alone and is too weak to care for herself at home.  Her goal of care is to be able to get chemotherapy on Tuesday as was previously scheduled to see if she has a response to this chemotherapeutic agent.  We will admit her to the hospital for repeat  paracentesis, oncology consultation and nursing cares with possible TCU placement given that she is unable to care for self at home due to the weakness.  Dr. Middleton graciously agreed to admit the patient.    Diagnosis:    ICD-10-CM    1. Generalized muscle weakness  M62.81    2. Dehydration  E86.0    3. Lightheadedness  R42    4. Malignant ascites  R18.0    5. Ovarian cancer, unspecified laterality (H)  C56.9      Scribe disclosure:  Anant JARAMILLO, am serving as a scribe at 3:37 PM on 10/8/2022 to document services personally performed by Veronica Paniagua MD based on my observations and the provider's statements to me.          Veronica Paniagua MD  10/08/22 2037

## 2022-10-09 NOTE — PLAN OF CARE
PT: Orders received. Chart reviewed and discussed with OT. Per discussion with OT, patient is mainly limited by activity tolerance which will be addressed by OT. No skilled PT intervention indicated at this time as needs can be met by OT. Defer discharge recommendations to OT.  Will complete orders.

## 2022-10-09 NOTE — PLAN OF CARE
Goal Outcome Evaluation:      Plan of Care Reviewed With: patient    Overall Patient Progress: decliningOverall Patient Progress: declining    Outcome Evaluation: Poor oral intake over the past month with decreased appetite, early satiety and bloating with recurrent ascites. Patient to order Ensure PRN (utilizing this at home). May need to consider TPN initiation if aligns with GOC and unable to increase oral intake.    Lori Du RD, LD  Weekend RD Pager: [phone]

## 2022-10-09 NOTE — CONSULTS
CLINICAL NUTRITION SERVICES  -  ASSESSMENT NOTE      Future/Additional Recommendations:   Ensure Enlive PRN   Encouraged small, frequent oral intake with ascites and early satiety     May need to consider TPN initiation if unable to increase oral intake and aligns with GOC    Malnutrition:   % Weight Loss:  Unable to fully assess at this time due to fluid status with ascites   % Intake:  </= 50% for >/= 1 month (severe malnutrition)  Subcutaneous Fat Loss:  Orbital region moderate depletion and Upper arm region moderate depletion  Muscle Loss:  Temporal region moderate depletion, Clavicle bone region severe depletion, Acromion bone region severe depletion and Dorsal hand region moderate-severe depletion  Fluid Retention:  None noted    Malnutrition Diagnosis: Severe malnutrition  In Context of:  Chronic illness or disease       REASON FOR ASSESSMENT  Yaneli Ramey is a 72 year old female seen by Registered Dietitian for Patient/Family Request      NUTRITION HISTORY  - Information obtained from patient at bedside this morning.   - Patient states she has had a decreased appetite and oral intake for the past ~month due to increasing ascites. Lives independently. Thinks she has been taking ~1/3 of her nutrition needs/baseline oral intake. Notes over the past 10 months she really hasn't had normal nutrition due to chemotherapy treatments, inconsistent bowels, being recommended to follow a low fiber diet. Notes that before her cancer diagnosis she was trying low FODMAP diet per a provider recommendation who thought she may have had IBS and lost 20# from this. Weighed ~160# prior to starting her cancer treatments + surgery and now done to 138# on her home scale (though this also accounts for her significant ascites). Drinks protein shakes at home - typically uses Orgain but over the past few weeks she has switched to Ensure and Boost Plus to increase her caloric intake. Notes she loves cheese and dairy products.   - No  "known food allergies noted.       CURRENT NUTRITION ORDERS  Diet Order:     Regular     Current Intake/Tolerance:  Had some oatmeal earlier this morning and just ordered a couple scrambled eggs. Focusing on small, frequent oral intake.       NUTRITION FOCUSED PHYSICAL ASSESSMENT FOR DIAGNOSING MALNUTRITION)  Yes             Observed:    Muscle wasting (refer to documentation in Malnutrition section) and Subcutaneous fat loss (refer to documentation in Malnutrition section)    Obtained from Chart/Interdisciplinary Team:  Ovarian cancer with peritoneal metastasis and recurrent ascites requiring weekly paracentesis  Admitted d/t failure to thrive, dehydration, anorexia     ANTHROPOMETRICS  Height: 5' 10\"  Weight: 138 lbs 0 oz  Body mass index is 19.8 kg/m .  Weight Status:  Normal BMI   IBW: 68.2 kg (150 lb) +/- 10%  % IBW: 92%  Weight History: Down 20# in the past 2-3 years (13% weight loss). No significant weight loss recently though suspect current weight being masked by fluid (and admission weight of 138 lb was reported by patient from her home scale).   Wt Readings from Last 10 Encounters:   10/08/22 62.6 kg (138 lb)   10/06/22 64.1 kg (141 lb 6.4 oz)   09/26/22 64.4 kg (142 lb)   09/25/22 59.9 kg (132 lb)   07/18/22 65.7 kg (144 lb 12.8 oz)   06/09/22 64 kg (141 lb 1.6 oz)   12/28/21 65.2 kg (143 lb 12.8 oz)   12/09/21 64 kg (141 lb)   08/12/20 71.5 kg (157 lb 11.2 oz)   03/07/19 71.9 kg (158 lb 9.6 oz)       LABS  Labs reviewed  +Urine ketones (20 mg/dL)    MEDICATIONS  Medications reviewed  Thera vit 3x/week  Stress tab daily  Vitamin D 1000 international unit(s) 3x/week       ASSESSED NUTRITION NEEDS PER APPROVED PRACTICE GUIDELINES:    Dosing Weight 62.6 kg (10/8)  Estimated Energy Needs: [phone] kcals (30-35 Kcal/Kg)  Justification: maintenance vs repletion with active cancer diagnosis   Estimated Protein Needs: 75-95 grams protein (1.2-1.5 g pro/Kg)  Justification: preservation of lean body mass and " increased needs with malignancy (hypercatabolic state)  Estimated Fluid Needs: 7654-1602  mL (30-35 mL/kg)  Justification: maintenance or per provider pending fluid status       NUTRITION DIAGNOSIS:  Inadequate oral intake related to decreased appetite, early satiety, bloating as evidenced by patient report of consuming 1/3 baseline oral intake over the past month at least, evidence of moderate to severe fat and muscle wasting on physical exam       NUTRITION INTERVENTIONS  Recommendations / Nutrition Prescription  Ensure Enlive PRN   Encouraged small, frequent oral intake with ascites and early satiety     May need to consider TPN initiation if unable to increase oral intake and aligns with GOC       Implementation  Nutrition education: Provided education on RD and role of care. Discussed nutrition status and recommendations. Reviewed ONS and indications for use/how to order PRN.  Medical Food Supplement -PRN      Nutrition Goals  Patient will consume > 50% small, frequent oral intake at minimum 3-4x/day vs nutrition support initiation       MONITORING AND EVALUATION:  Progress towards goals will be monitored and evaluated per protocol and Practice Guidelines      Lori Du RD, LD  Weekend RD Pager: [phone]

## 2022-10-09 NOTE — H&P
Admitted: 10/08/2022    PRIMARY CARE PHYSICIAN:  [facility].    PRIMARY ONCOLOGIST: Dr. Ewa Martinez.    CHIEF COMPLAINT:  Generalized weakness, increased abdominal distention, unable to care for herself, decreased oral intake.    HISTORY OF PRESENT ILLNESS:  Nasra Ramey is an unfortunate 72-year-old female who lives independently with ovarian cancer with metastasis, AFib, recurrent ascites requiring weekly paracentesis, who presents with abdominal distention and increased weakness and fatigue for the last few days ago.  Two days ago, she underwent 3 liters of paracentesis.  Prior to that was about a week ago.  The patient today feels that her distention was more due to gas rather than fluid, unable to eat or drink much.  She is due for a second attempt at chemotherapy on Tuesday.  She has been feeling nauseated and has had a temperature of 100, had difficulty passing gas, but did eventually have a bowel movement this morning.  The patient was brought in by her niece to [facility].    In the Emergency Department, the patient was afebrile with stable vital signs.  Blood work revealed sodium 135, potassium 4.2, BUN 21, creatinine 0.92, glucose 105.  COVID test was negative.  White count 10.4, hemoglobin 11.8, platelets 458.  She had a CT, which showed increased peritoneal mets, increased ascites, no obstruction.  Due to the patient's anorexia, worsening weakness and dehydration, the patient is being admitted for further medical stabilization before chemotherapy.    PAST MEDICAL HISTORY:   1.  Ovarian cancer with intraabdominal mets.  2.  Positive for dysfunction.  3.  Dizziness with ataxia.  4.  History of hemorrhoids.   5.  History of nasal polyps.  6.  Bilateral nuclear sclerosis.  7.  Left eye nevus of the iris.  8.  History of SVT.  9.  Osteoarthritis.  10.  Peripheral vascular disease.  11.  Osteoarthritis with osteopenia.  12.  Dry eyes.  13.  Pathological fractures.  14.   Asthma.  15.  Allergic rhinitis.  16.  Congenital pes planus.  17.  Paroxysmal atrial fibrillation.  18.  Lumbar radiculopathy.  19.  GERD.  20.  Cervicalgia.  21.  Seasonal affective disorder.  22.  Ascites.    PAST SURGICAL HISTORY:    1.  Breast surgery.  2.  Bunionectomy.  3.  Da Naman lysis of adhesions.  4.  Da Naman salpingo-oophorectomy  5.  Diskectomy.  6.  Hysterectomy, laparoscopy  7.  Hammertoe repair.  8.  Elbow repair.    9.  Ureterolysis.    FAMILY HISTORY:  Positive for heart disease, emphysema, lupus.    SOCIAL HISTORY:  No tobacco or alcohol.  Lives independently.  She is FULL CODE.    ALLERGIES:  SULFA DRUGS, ADHESIVE, CROMOLYN, SODIUM, ESTRADIOL, HYDROCODONE, OXYCODONE, TAXOL, PERCOCET, SEPTRA.    CURRENT MEDICATION LIST:  Include Brody, vitamin B complex with vitamin C, Genteal eyedrops, Refresh eyedrops, Colace, Allegra, Tambocor, Flonase, lactate, EMLA cream, methylcellulose, multivitamin, MiraLax, simethicone, vitamin D, probiotic.    REVIEW OF SYSTEMS:  Ten points reviewed.    PHYSICAL EXAMINATION:    VITAL SIGNS:  Temperature 98.2, heart rate 91, respirations 17, blood pressure 100/61, sats are 96% on room air.  GENERAL:  The patient is a frail 72-year-old  female.  HEENT:  Pupils equal.  Sclerae are anicteric.  Mucous membranes are dry.  NECK:  JVP is flat.  PULMONARY:  Lungs are clear.  CARDIOVASCULAR:  S1, S2, borderline tachycardic.  GASTROINTESTINAL:  Abdomen is distended with some ascites.  MUSCULOSKELETAL:  Lower extremity has edema.  NEUROLOGIC:  She is able to move all 4 extremities against gravity.  Cranial nerves grossly intact.  SKIN:  Warm, dry, well perfused.  PSYCHIATRIC:  Mood and affect pleasant, cooperative.    LABORATORY AND IMAGING STUDIES:  As dictated in history of present illness.    ASSESSMENT:  Nasra Ramey is a 69-year-old independent living with history of ovarian cancer with metastasis, who presents with failure to thrive, dehydration, worsening  peritoneal mets, increasing ascites without any obstruction, being admitted for further treatment.    PLAN:     1.  Ovarian cancer with increased abdominal pain with evidence of increased peritoneal mets and ascites.  The patient will be seen by GI.  The patient is to undergo chemotherapy second line on Tuesday, but this could possibly be moved up to Monday.  We will obtain a GYN/ONC consultation.  Meanwhile, we will give her supportive therapy with fluids, antiemetics and nutrition including albumin and IV fluids and possible paracentesis for her ascites.  The patient received antiemetic.  2.  Dry eyes.  We will continue the patient on her eyedrops.  3.  History of atrial fibrillation.  We will continue the patient on her flecainide.  4.  History of seasonal allergies.  The patient will be continued on her Flonase and Allegra.  5.  DVT prophylaxis.  The patient received subcutaneous heparin.    CODE STATUS:  FULL.    Jasen Middleton MD        D: 10/08/2022   T: 10/08/2022   MT: [id number]    Name:     ROBERT POOLE  MRN:      [id number]        Account:     [id number]   :      1950           Admitted:    10/08/2022       Document: [id number]

## 2022-10-09 NOTE — CONSULTS
"[facility] Oncology Consultation    Yaneli Ramey MRN# [id number]   Age: 72 year old YOB: 1950     Date of Admission:  10/8/2022    Reason for consult: Progressive ovarian cancer.       Requesting physician: Dr. Middleton                   Chief Complaint:   Platinum resistant recurrent ovarian cancer with progression, continued decline.     Ms. 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  She was treated with neoadjuvant chemotherapy and then underwent interval surgery on 6/9/2022, followed by additional chemotherapy.  Her last treatment, cycle 5 of carboplatin and abraxane was on 8/23/2022.  She presented with pain and a PET CT on 9/6/2022 showed disease progression.  She was planned to start a new regimen this upcoming Tuesday.  However, she has had recurrent rapidly recurrent ascites requiring paracentesis.  She has not been able to eat well and has continued to weaken.  She presented to the ER yesterday and was admitted for supportive cares.  CT done yesterday shows continued disease progression compared to CT recently done on 9/25/22, about 2 weeks ago.    Yaneli tells me she knows she is not doing well.  She has not been able to take care of herself, even unable to do normal activities like wash her face or brush her teeth.  More recently, she has been spending most of her time in bed.   She is weak but also unsteady when she gets up, she describes as \"presyncopal\".  She has no pain but has abdominal discomfort.  She is eating but not much.  Gets full easily.  Voiding but not much.  She is moving her bowels but started having diarrhea, no noticeable blood in stools.  No sob/cp.  No bleeding.            Cancer Treatment History:       1/20 noted fullness and dyspepsia when doing prep for virtual colonography.  Notes left sided discomfort with inflation which she had not had on prior examinations.      Placed on " Omeprazole x 6 wks.    4/26/21 GI consultation    11/21 developed dull  pain in periumbilical area as well as excessive gas production    11/2/21 CT A/P:  Moderate infiltrative nodularity throughout omentum in the left abdomen concerning for carcinomatosis.  Nodularity abuts anterior left aspect of bladder.  Small focal area of incomplete distension/thickening of the sigmoid colon in deep right pelvis.    11/5/21  = 410 U/mL    11/8/21 CT guided biopsy omentum.    Pathology:  High-grade serous carcinoma    12/9/2021 Diagnostic laparoscopy, unresectable disease.    12/17/21 C1 Carboplatin/Taxol.   =  613.9 U/mL    1/7/22 C2 Carboplatin/Taxol.   = 230.6 U/mL. Hypersensitivity reaction.    1/27/22  = 80.7 U/mL    1/28/22 C3 Carboplatin/Taxol with increased premedications - reaction again    2/8/22 Genetic counseling and testing negative for mutations    2/17/22  = 26.9 U/mL    3/8/22 C1 Carboplatin/Abraxane.   =  18.9 U/mL    3/29/22 C2 Carboplatin/Abraxane.   =  14.3 U/mL    4/19/22 C3 Carboplatin/Abraxane.   = 16.7 U/mL    5/6/22 PET/CT:  SYDNI    6/9/22 Robotic assisted bilateral salpingo-oophorectomy, bilateral ureterolysis, multiple peritoneal biopsies, infracolic omentectomy, lysis of adhesions, washings.    Pathology: High-grade mullerian carcinoma, favoring high-grade serous carcinoma, involving bilateral ovaries.  Small foci of residual carcinoma in the omentum.  Biopsies from the rectouterine pouch positive for metastatic carcinoma.  Biopsy of the peritoneum of the urinary bladder positive for malignancy. Positive pelvic washings.    7/16-19/22: Hospitalized at [facility] With hypertension and elevated troponins. CT of the head and CT angiogram of head and neck showed no acute process.  MRI brain also negative.  Lexiscan stress test normal. Patient is attributing this to getting her COVID booster 3 days prior.    7/25/22 Treatment delayed due to  patient testing positive for COVID    8/1/22  = 18.4 U/mL    8/2/22 C4 Carboplatin/Abraxane    8/23/22 C5 Carboplatin/Abraxane.    = 18.5    9/6/22 Patient called with worsening abdominal pain, urinary symptoms, and indigestion    9/6/22 CT CAP:  New moderate right hydroureteronephrosis to the level of the distal ureter in the pelvis without obvious distal obstructing calculus or mass- worrisome for an obstructing malignant stricture.  1.9 x 1.4 cm nodular focus abutting the anterior margin of the rectum is indeterminate for a new serosal implant, loop of bowel or displaced left ovary. Persistent small sub-centimeter residual nodular foci within the anterior omentum of the left mid-abdomen- representing serosal implants. New mild abdominal ascites may be seen with peritoneal carcinomatosis.    9/6/22 PET: findings suspicious for development of peritoneal carcinomatosis     9/25/22 ED visit for abdominal bloating and gas affecting her ability to sleep, eat, drink.  C/o inability for do ADL's.  Abdominal X-ray SYDNI.  CT A/P:  Dilation of right urinary collecting gumaro has decreased.  Amount of abdominal and pelvic ascites has moderately increased.  Amount of stranding in mesenteric fat and omentum has increased.  Cholelithiasis.    9/25/22 Consultation with Dr. Shelbi Lr.  Platinum resistant disease with options for Gemzar/Bevacizumab or Cytoxan/Bevacizumab.  Consented for Nanoovary.    9/29/222 Paracentesis.    10/5/22 Consultation at the [facility].    10/6/22 ER.  Paracentesis.           Past Medical History:     H/o SVT    Osteoarthritis    H/o posterior vitreous detachment left eye    Asthma    Allergic rhinitis    Depression    Stage IIIC bilateral ovarian high grade serous carcinoma          Past Surgical History:     9/30/20 Colonoscopy    Repair of right eye ptosis and bilateral blepharoplasty, levator repair    Hysterectomy     Laminectomy/discectomy L4-L5    Breast biopsy    Right  endoscopic sinus surgery    Left foot bunionectomy    Correction of hammertoe    Arthrodesis x 3     Right thumb surgery -repair of ulnar collateral ligament    Port placement    Diagnostic laparoscopy    Robotic assisted bilateral salpingo-oophorectomy, bilateral ureterolysis, multiple peritoneal biopsies, infracolic omentectomy, lysis of adhesions, washings          Social History:   Lives alone.  Retired forensic psychologist.  Used to smoke but quit.  No alcohol.          Family History:   Father with colon cancer age 55, prostate cancer, CAD, COPD  Mother with diabetes, CVA, HTN, hyperlipidemia  Brother with Wilm's tumor  Brother with diabetes mellitus  Brother with kidney disease  Sister with Wilm's tumor  Sister with kidney disease  MGF with kidney disease  MGM with CAD  PGM with HTN          Allergies:   Adhesives, chromelin, septra, sulfa, hydrocodone, paclitaxel.          Medications:     Medications Prior to Admission   Medication Sig Dispense Refill Last Dose     alpha-d-galactosidase (BEANO) tablet Take 1 tablet by mouth daily as needed for intestinal gas        B Complex-C (VITAMIN B COMPLEX W/VITAMIN C) TABS tablet Take 1 tablet by mouth four times a week Monday, Wednesday, Friday, Saturday   Past Month at Unknown time     Carboxymethylcell-Hypromellose (GENTEAL) 0.25-0.3 % GEL Place 1 drop into both eyes every evening as needed        carboxymethylcellulose PF (REFRESH PLUS) 0.5 % ophthalmic solution Place 1 drop into both eyes every evening   Past Week at Unknown time     Carboxymethylcellulose Sodium (THERATEARS) 0.25 % SOLN Place 1 drop into both eyes 4 times daily as needed   Past Week at Unknown time     clindamycin (CLEOCIN T) 1 % external solution Apply topically daily as needed (ACNE/CUTS)        docusate sodium (COLACE) 100 MG capsule Take 100-200 mg by mouth daily as needed for constipation        fexofenadine (ALLEGRA) 180 MG tablet Take 180 mg by mouth daily as needed for allergies         flecainide (TAMBOCOR) 50 MG tablet Take 100 mg ( 2 tabs) twice daily 360 tablet 3 10/8/2022 at am     fluticasone (FLONASE) 50 MCG/ACT nasal spray Spray 1 spray into both nostrils daily as needed        lactase (LACTAID) 3000 UNIT tablet Take 3,000 Units by mouth daily as needed for indigestion        lidocaine-prilocaine (EMLA) 2.5-2.5 % external cream Apply topically as needed (1 HOUR PRIOR TO PORT DRAINS/INFUSION)        methylcellulose (CITRUCEL) powder Take 1.5 teaspoonful by mouth daily as needed        multivitamin, therapeutic (THERA-VIT) TABS tablet Take 1 tablet by mouth three times a week Tuesday, Thursday, Sunday   Past Week at Unknown time     OVER-THE-COUNTER Place 1 drop into both eyes daily as needed Medication Name: Hylo-Forte Oph Soln        polyethylene glycol (MIRALAX) 17 GM/Dose powder Take 1 capful by mouth daily as needed        polyethylene glycol-propylene glycol PF (SYSTANE HYDRATION PF) 0.4-0.3 % SOLN opthalmic solution Place 1 drop into both eyes 4 times daily as needed for dry eyes        Probiotic Product (PROBIOTIC DAILY PO) Take 1 capsule by mouth daily as needed        simethicone (MYLICON) 125 MG chewable tablet Take 125 mg by mouth daily as needed for intestinal gas        Vitamin D (Cholecalciferol) 25 MCG (1000 UT) TABS Take 1 tablet by mouth four times a week Monday, Wednesday, Friday, Saturday   Past Week at Unknown time             Review of Systems:   The Review of Systems is negative other than noted in the HPI     Weight: 62.6 kg (138 lb)      Vital Signs with Ranges  Temp:  [97.8  F (36.6  C)-98.6  F (37  C)] 97.8  F (36.6  C)  Pulse:  [] 81  Resp:  [17-18] 18  BP: ()/(58-72) 105/71  SpO2:  [95 %-97 %] 95 %  I/O's Last 24 hours    GEN: No acute distress, thin and frail.  Elderly.   PSYCH: Appropriate.  NEUROLOGIC: Alert, no focal deficits.  ABDOMEN: Soft, mild distention.  EXTREMITRIES:  No edema.          Data:   All laboratory and imaging data in the past 24  hours reviewed  Lab Results   Component Value Date    WBC 10.4 10/08/2022    HGB 11.8 10/08/2022    HCT 36.8 10/08/2022     (H) 10/08/2022     10/09/2022    POTASSIUM 4.3 10/09/2022    CHLORIDE 105 10/09/2022    CO2 24 10/09/2022    BUN 18 10/09/2022    CR 0.83 10/09/2022     (H) 10/09/2022    DD 0.3 01/04/2017    NTBNPI 151 01/04/2017    TROPI <0.015 04/17/2021    AST 19 10/09/2022    ALT 18 10/09/2022    ALKPHOS 36 (L) 10/09/2022    BILITOTAL 0.4 10/09/2022    Total protein 4, albumin 1.8.    EXAM: CT ABDOMEN PELVIS W CONTRAST  LOCATION: [facility]  DATE/TIME: 10/8/2022 4:56 PM     INDICATION: abdominal pain, decreased stool flatus, h o ovarian cancer, weakness  COMPARISON: 09/25/2022  TECHNIQUE: CT scan of the abdomen and pelvis was performed following injection of IV contrast. Multiplanar reformats were obtained. Dose reduction techniques were used.  CONTRAST: 65 mL Isovue 370     FINDINGS:   LOWER CHEST: Medial right lower lobe chronic atelectasis or scarring. Incidental left lower lobe granuloma.     HEPATOBILIARY: No biliary dilatation. Mass effect on the anterior margin of the liver by complex septated upper abdominal ascites, similar to prior.     PANCREAS: Unremarkable     SPLEEN: Incidental splenic granuloma.     ADRENAL GLANDS: Unremarkable     KIDNEYS/BLADDER: No hydronephrosis     BOWEL: Centrally located bowel loops without evidence of bowel obstruction.     LYMPH NODES: No significant retroperitoneal adenopathy.     VASCULATURE: Moderate atherosclerotic calcification. Patent portal vein.     PELVIC ORGANS: Hysterectomy.     MUSCULOSKELETAL: No acute bony abnormalities.     OTHER: Extensive peritoneal metastatic disease redemonstrated with moderate volume of abdominal and pelvic ascites, slightly increased in the interval. Peritoneal thickening with multiple peritoneal nodules redemonstrated. The degree of peritoneal   thickening and nodularity has  significantly increased. Infiltration of the omentum, with omental nodularity has also increased.                                                                      IMPRESSION:   1.  Increasing peritoneal metastatic disease/ascites.  2.  No evidence of bowel obstruction.    ASSESSMENT:   Ms. Yaneli Ramey is a 72 year old with rapidly progressive platinum resistant high grade serous ovarian cancer, admitted with weakness and failure to thrive.    PLAN:  1. Ovarian cancer - I reviewed the recent CT with her that showed disease progression, even compared from the CT 9/25, just almost 2 weeks ago.  She has continued to decline functionally to the point of not being able to care for herself at home.  Her nutritional status is also very poor, albumin noted to be 1.8.  I am very concerned about her ability to tolerate more chemo.  Her cancer is very aggressive and is not curable.  Chemo is with the intent of controlling disease and extending time, balancing with quality of life.  However, I do not think she will be able to tolerate any meaningful doses of chemo and am doubtful that chemo with have any meaningful benefit in extending time or improving her quality of life.  With her poor performance status, she is at very high risk for complications from the chemo and it could shorten her time.  We discussed the option of no further treatment/hospice, focusing on symptom management.  She tells me she is aware of how bad she is doing but wants to think things through.  She understands what we talked about but wants to also talk to her niece Elizabeth.  If she is ready to discuss hospice, we can obtain a consult.  Otherwise, Palliative Care would be helpful in discussing symptom management/goals of care.  I spoke to her niece Elizabeth by phone who has also noticed Yaneli's rapid decline and agrees that hospice would be the best option.  I brought up code status briefly with Elizabeth but we will need to address this more.    2.  Malignant ascites - rapidly accumultating.  Getting prn paracentesis.  Can consider pleurex.    3. Poor oral intake/failure to thrive - continue supportive cares.  Seen by nutrition.    4. Dispo - pending.  Unable to live alone and will need to be at a facility.    Discussed with Dr. Middleton.    Attestation:  I have reviewed today's vital signs, notes, medications, labs and imaging.    Corinne Ortega MD

## 2022-10-09 NOTE — PLAN OF CARE
Goal Outcome Evaluation: A/o x 4, c/o gas pain cramps to abdomen prn morphine given x 1.  C/o nausea given prn zofran and compazine x 1.  Requiring 1L oxygen on continuous pulse ox.  Tele NSR.  Abdomen rounded and distended.  Poor appetite.  Port to R chest was sluggish 1 x dose alteplase done and now has good blood return.  Up 1A to BSC.  Continent of bowel and bladder.  Has loose stools.  Plan for paracentesis tomorrow.

## 2022-10-09 NOTE — ED NOTES
[facility]  ED Nurse Handoff Report    ED Chief complaint: Generalized Weakness      ED Diagnosis:   Final diagnoses:   Generalized muscle weakness   Dehydration   Lightheadedness   Malignant ascites   Ovarian cancer, unspecified laterality (H)       Code Status: Not addressed in ED    Allergies:   Allergies   Allergen Reactions     Adhesive Tape      Cromolyn      Estradiol      Hydrocodone Other (See Comments)     Other Drug Allergy (See Comments)      Percodan       Paclitaxel      Percocet [Oxycodone-Acetaminophen]      Septra [Sulfamethoxazole W-Trimethoprim]        Patient Story: Hx of advanced ovarian CA, recent paracentesis, now increased bloating and fatigue.   Focused Assessment:    CT Abdomen Pelvis w Contrast   Final Result   IMPRESSION:    1.  Increasing peritoneal metastatic disease/ascites.   2.  No evidence of bowel obstruction.        Labs Ordered and Resulted from Time of ED Arrival to Time of ED Departure   COMPREHENSIVE METABOLIC PANEL - Abnormal       Result Value    Sodium 135      Potassium 4.2      Chloride 101      Carbon Dioxide (CO2) 27      Anion Gap 7      Urea Nitrogen 21      Creatinine 0.92      Calcium 8.5      Glucose 105 (*)     Alkaline Phosphatase 45      AST 22      ALT 25      Protein Total 5.1 (*)     Albumin 2.2 (*)     Bilirubin Total 0.4      GFR Estimate 65     CBC WITH PLATELETS AND DIFFERENTIAL - Abnormal    WBC Count 10.4      RBC Count 3.96      Hemoglobin 11.8      Hematocrit 36.8      MCV 93      MCH 29.8      MCHC 32.1      RDW 14.8      Platelet Count 458 (*)     % Neutrophils 69      % Lymphocytes 18      % Monocytes 10      % Eosinophils 1      % Basophils 1      % Immature Granulocytes 1      NRBCs per 100 WBC 0      Absolute Neutrophils 7.2      Absolute Lymphocytes 1.9      Absolute Monocytes 1.1      Absolute Eosinophils 0.1      Absolute Basophils 0.1      Absolute Immature Granulocytes 0.1      Absolute NRBCs 0.0     ISTAT GASES LACTATE  VENOUS POCT - Abnormal    Lactic Acid POCT 0.6      Bicarbonate Venous POCT 28      O2 Sat, Venous POCT 57 (*)     pCO2V Venous POCT 44      pH Venous POCT 7.41      pO2 Venous POCT 30     MAGNESIUM - Normal    Magnesium 2.1     LIPASE - Normal    Lipase 123     TROPONIN I - Normal    Troponin I High Sensitivity 13     COVID-19 VIRUS (CORONAVIRUS) BY PCR - Normal    SARS CoV2 PCR Negative     ROUTINE UA WITH MICROSCOPIC REFLEX TO CULTURE         Treatments and/or interventions provided:   Medications   lidocaine 1 % 0.1-1 mL (has no administration in time range)   lidocaine (LMX4) cream (has no administration in time range)   sodium chloride (PF) 0.9% PF flush 3 mL (has no administration in time range)   sodium chloride (PF) 0.9% PF flush 3 mL (has no administration in time range)   lactated ringers infusion (1,000 mLs Intravenous New Bag 10/8/22 1741)   ondansetron (ZOFRAN) injection 4 mg (0 mg Intravenous Hold 10/8/22 1544)   lactated ringers BOLUS 1,000 mL (1,000 mLs Intravenous New Bag 10/8/22 1842)   lidocaine-prilocaine (EMLA) cream 1 g (1 g Topical Given 10/8/22 1428)   lactated ringers BOLUS 1,000 mL (0 mLs Intravenous Stopped 10/8/22 1642)   iopamidol (ISOVUE-370) solution 65 mL (65 mLs Intravenous Given 10/8/22 1649)   sodium chloride 0.9 % bag 500mL for CT scan flush use (60 mLs Intravenous Given 10/8/22 1649)       Patient's response to treatments and/or interventions: Waiting for pt to give urine sample    To be done/followed up on inpatient unit:  see orders    Does this patient have any cognitive concerns?: none    Activity level - Baseline/Home:  Independent  Activity Level - Current:   Unknown    Patient's Preferred language: English   Needed?: No    Isolation: None  Infection: Not Applicable  Patient tested for COVID 19 prior to admission: YES  Bariatric?: No    Vital Signs:   Vitals:    10/08/22 1425 10/08/22 1500 10/08/22 1505 10/08/22 1845   BP:  101/72  110/68   Pulse:   91    Resp:        Temp: 98.2  F (36.8  C)      TempSrc: Temporal      SpO2:   97%    Weight:       Height:           Cardiac Rhythm:     Was the PSS-3 completed:   Yes  What interventions are required if any?               Family Comments: family at bedside  OBS brochure/video discussed/provided to patient/family: No              Name of person given brochure if not patient:               Relationship to patient:     For the majority of the shift this patient's behavior was Green.   Behavioral interventions performed were none.    ED NURSE PHONE NUMBER: [phone]

## 2022-10-09 NOTE — PROGRESS NOTES
"[facility]  Hospitalist Progress Note  Jasen Middleton MD  10/09/2022    Assessment & Plan   ASSESSMENT:  Nasra Ramey is a 69-year-old independent living with history of ovarian cancer with metastasis, who presents with failure to thrive, dehydration, worsening peritoneal mets, increasing ascites without any obstruction, being admitted for further treatment.     PLAN:     1.  Ovarian cancer with increased abdominal pain with evidence of increased peritoneal mets and ascites.   -- having diarrhea whenever she eats, likely not able to absorb foods  -- initial plans to undergo chemotherapy second line on Tuesday  -- seen by [place] [facility], felt she is not strong enough for chemotherapy  -- plan for therapeutic paracentesis on 10/10   -- SL IVF as this will only make her ascites worse  -- RD consult for nutrition, albumin post fluid is 1.8  -- she wants to hold off on palliative care / hospice for a few days until she absorbs her current situation  -- antiemetics, antidiarrheals  -- morphine for abd pain  2.  Dry eyes.    -- continue the patient on her eyedrops.  3.  History of atrial fibrillation.  -- continue the patient on her flecainide.  4.  History of seasonal allergies.   -- continued on her Flonase and Allegra.  5.  DVT prophylaxis.  The patient received subcutaneous heparin.     CODE STATUS:  FULL.    Interval History   -- no acute overnight events  -- some diarrhea noted and bloating  -- has c/o of abdominal ascites but has not had any pulmonary issues on oxygen need  -- discussed with RN    -Data reviewed today: I reviewed all new labs and imaging over the last 24 hours. I personally reviewed no images or EKG's today.    Physical Exam    , Blood pressure 105/71, pulse 81, temperature 97.8  F (36.6  C), temperature source Oral, resp. rate 18, height 1.778 m (5' 10\"), weight 62.6 kg (138 lb), SpO2 95 %, not currently breastfeeding.  Vitals:    10/08/22 1424   Weight: 62.6 kg (138 " lb)     Vital Signs with Ranges  Temp:  [97.8  F (36.6  C)-98.6  F (37  C)] 97.8  F (36.6  C)  Pulse:  [] 81  Resp:  [17-18] 18  BP: ()/(58-72) 105/71  SpO2:  [95 %-97 %] 95 %  I/O's Last 24 hours  No intake/output data recorded.    Constitutional: Awake, alert, cooperative, no apparent distress  Respiratory: Clear to auscultation bilaterally, no crackles or wheezing  Cardiovascular: Regular rate and rhythm, normal S1 and S2   GI: Normal bowel sounds, soft, mildlydistended, diffusely-tender  Skin/Integumen: No rashes, no cyanosis, + edema  Other:      Medications   All medications were reviewed.      carboxymethylcellulose PF  1 drop Both Eyes QPM     flecainide  100 mg Oral Q12H [facility] (08/20)     heparin  5-10 mL Intracatheter Q28 Days     heparin ANTICOAGULANT  5,000 Units Subcutaneous Q12H     heparin lock flush  5-10 mL Intracatheter Q24H     multivitamin, therapeutic  1 tablet Oral Once per day on Tue Thu Sat     sodium chloride (PF)  10-20 mL Intracatheter Q28 Days     vitamin B complex with vitamin C  1 tablet Oral Daily     Vitamin D (Cholecalciferol)  1 tablet Oral Once per day on Sun Tue Thu Sat        Data   Recent Labs   Lab 10/09/22  0916 10/08/22  1530 10/06/22  2000   WBC  --  10.4 10.2   HGB  --  11.8 11.4*   MCV  --  93 93   PLT  --  458* 392    135 136   POTASSIUM 4.3 4.2 4.6   CHLORIDE 105 101 99   CO2 24 27 25   BUN 18 21 24.4*   CR 0.83 0.92 1.26*   ANIONGAP 8 7 12   HOLLY 7.6* 8.5 8.6*   * 105* 111*   ALBUMIN 1.8* 2.2* 3.0*   PROTTOTAL 4.0* 5.1* 5.4*   BILITOTAL 0.4 0.4 0.2   ALKPHOS 36* 45 56   ALT 18 25 32   AST 19 22 41*   LIPASE  --  123  --        Recent Results (from the past 24 hour(s))   CT Abdomen Pelvis w Contrast    Narrative    EXAM: CT ABDOMEN PELVIS W CONTRAST  LOCATION: [facility]  DATE/TIME: 10/8/2022 4:56 PM    INDICATION: abdominal pain, decreased stool flatus, h o ovarian cancer, weakness  COMPARISON: 09/25/2022  TECHNIQUE: CT  scan of the abdomen and pelvis was performed following injection of IV contrast. Multiplanar reformats were obtained. Dose reduction techniques were used.  CONTRAST: 65 mL Isovue 370    FINDINGS:   LOWER CHEST: Medial right lower lobe chronic atelectasis or scarring. Incidental left lower lobe granuloma.    HEPATOBILIARY: No biliary dilatation. Mass effect on the anterior margin of the liver by complex septated upper abdominal ascites, similar to prior.    PANCREAS: Unremarkable    SPLEEN: Incidental splenic granuloma.    ADRENAL GLANDS: Unremarkable    KIDNEYS/BLADDER: No hydronephrosis    BOWEL: Centrally located bowel loops without evidence of bowel obstruction.    LYMPH NODES: No significant retroperitoneal adenopathy.    VASCULATURE: Moderate atherosclerotic calcification. Patent portal vein.    PELVIC ORGANS: Hysterectomy.    MUSCULOSKELETAL: No acute bony abnormalities.    OTHER: Extensive peritoneal metastatic disease redemonstrated with moderate volume of abdominal and pelvic ascites, slightly increased in the interval. Peritoneal thickening with multiple peritoneal nodules redemonstrated. The degree of peritoneal   thickening and nodularity has significantly increased. Infiltration of the omentum, with omental nodularity has also increased.      Impression    IMPRESSION:   1.  Increasing peritoneal metastatic disease/ascites.  2.  No evidence of bowel obstruction.       Jasen Middleton MD  Text Page  (7am to 6pm)

## 2022-10-09 NOTE — PHARMACY-ADMISSION MEDICATION HISTORY
Pharmacy Medication History  Admission medication history interview status for the 10/8/2022  admission is complete. See EPIC admission navigator for prior to admission medications     Location of Interview: Patient room  Medication history sources: Patient and Surescripts    Significant changes made to the medication list:  1. Changed Miralax and Citrucel to as needed  2. No other changes made to supplements and eye drops    In the past week, patient estimated taking medication this percent of the time: greater than 90%    Additional medication history information:   none    Medication reconciliation completed by provider prior to medication history? No    Time spent in this activity: 10 min    Prior to Admission medications    Medication Sig Last Dose Taking? Auth Provider Long Term End Date   alpha-d-galactosidase (BEANO) tablet Take 1 tablet by mouth daily as needed for intestinal gas  Yes Reported, Patient     B Complex-C (VITAMIN B COMPLEX W/VITAMIN C) TABS tablet Take 1 tablet by mouth four times a week Monday, Wednesday, Friday, Saturday Past Month at Unknown time Yes Reported, Patient     Carboxymethylcell-Hypromellose (GENTEAL) 0.25-0.3 % GEL Place 1 drop into both eyes every evening as needed  Yes Reported, Patient     carboxymethylcellulose PF (REFRESH PLUS) 0.5 % ophthalmic solution Place 1 drop into both eyes every evening Past Week at Unknown time Yes Reported, Patient     Carboxymethylcellulose Sodium (THERATEARS) 0.25 % SOLN Place 1 drop into both eyes 4 times daily as needed Past Week at Unknown time Yes Reported, Patient     clindamycin (CLEOCIN T) 1 % external solution Apply topically daily as needed (ACNE/CUTS)  Yes Reported, Patient     docusate sodium (COLACE) 100 MG capsule Take 100-200 mg by mouth daily as needed for constipation  Yes Reported, Patient     fexofenadine (ALLEGRA) 180 MG tablet Take 180 mg by mouth daily as needed for allergies  Yes Unknown, Entered By History     flecainide  (TAMBOCOR) 50 MG tablet Take 100 mg ( 2 tabs) twice daily 10/8/2022 at am Yes José Miguel Martin MD Yes    fluticasone (FLONASE) 50 MCG/ACT nasal spray Spray 1 spray into both nostrils daily as needed  Yes Reported, Patient     lactase (LACTAID) 3000 UNIT tablet Take 3,000 Units by mouth daily as needed for indigestion  Yes Reported, Patient     lidocaine-prilocaine (EMLA) 2.5-2.5 % external cream Apply topically as needed (1 HOUR PRIOR TO PORT DRAINS/INFUSION)  Yes Reported, Patient Yes    methylcellulose (CITRUCEL) powder Take 1.5 teaspoonful by mouth daily as needed  Yes Reported, Patient     multivitamin, therapeutic (THERA-VIT) TABS tablet Take 1 tablet by mouth three times a week Tuesday, Thursday, Sunday Past Week at Unknown time Yes Unknown, Entered By History     OVER-THE-COUNTER Place 1 drop into both eyes daily as needed Medication Name: Hylo-Forte Oph Soln  Yes Reported, Patient     polyethylene glycol (MIRALAX) 17 GM/Dose powder Take 1 capful by mouth daily as needed  Yes Reported, Patient     polyethylene glycol-propylene glycol PF (SYSTANE HYDRATION PF) 0.4-0.3 % SOLN opthalmic solution Place 1 drop into both eyes 4 times daily as needed for dry eyes  Yes Reported, Patient     Probiotic Product (PROBIOTIC DAILY PO) Take 1 capsule by mouth daily as needed  Yes Unknown, Entered By History     simethicone (MYLICON) 125 MG chewable tablet Take 125 mg by mouth daily as needed for intestinal gas  Yes Reported, Patient     Vitamin D (Cholecalciferol) 25 MCG (1000 UT) TABS Take 1 tablet by mouth four times a week Monday, Wednesday, Friday, Saturday Past Week at Unknown time Yes Reported, Patient       The information provided in this note is only as accurate as the sources available at the time of update(s)   Miah JEROME PharmD

## 2022-10-09 NOTE — ED NOTES
RN answered Pt call light. Pt asking for ice chips. Pt given ice chips and warm blanket. Pt denies other needs at this time. Call light within reach.

## 2022-10-09 NOTE — PROGRESS NOTES
10/09/22 1050   Appointment Info   Signing Clinician's Name / Credentials (OT) Rin Shields OTR/L   Living Environment   People in Home alone   Current Living Arrangements other (see comments)  ([facility])   Home Accessibility no concerns   Self-Care   Usual Activity Tolerance good   Current Activity Tolerance poor   Regular Exercise No   Equipment Currently Used at Home grab bar, tub/shower  (Step in shower)   Fall history within last six months no   Activity/Exercise/Self-Care Comment At baseline pt is independent in self-cares without gait aid. Reports recently at home having severe weakness, syncope, and being largely bed bound   Instrumental Activities of Daily Living (IADL)   Previous Responsibilities meal prep;housekeeping;laundry;medication management;finances;driving   IADL Comments Retired.   General Information   Onset of Illness/Injury or Date of Surgery 10/08/22   Referring Physician Jasen Middleton MD   Patient/Family Therapy Goal Statement (OT) Improve strength   Additional Occupational Profile Info/Pertinent History of Current Problem 69-year-old independent living with history of ovarian cancer with metastasis, who presents with failure to thrive, dehydration, worsening peritoneal mets, increasing ascites without any obstruction, being admitted for further treatment. Hx of Afib. Paracentesis planned for 10/10/22   Existing Precautions/Restrictions fall   Cognitive Status Examination   Affect/Mental Status (Cognitive) WFL   Follows Commands follows multi-step commands   Visual Perception   Impact of Vision Impairment on Function (Vision) Pt denies vision impairment   Sensory   Sensory Comments Pt reports baseline severe neuropathy in feet and mild neuropathy in finger tips.   Pain Assessment   Patient Currently in Pain Yes, see Vital Sign flowsheet   Strength Comprehensive (MMT)   Comment, General Manual Muscle Testing (MMT) Assessment Generalized weakness   Transfers   Transfers toilet  transfer;shower transfer   Shower Transfer   [place] Level (Shower Transfer) minimum assist (75% patient effort)  (per clinical judgment)   Toilet Transfer   [place] Level (Toilet Transfer) moderate assist (50% patient effort)   Activities of Daily Living   BADL Assessment/Intervention lower body dressing;toileting   Lower Body Dressing Assessment/Training   [place] Level (Lower Body Dressing) moderate assist (50% patient effort)   Toileting   [place] Level (Toileting) maximum assist (25% patient effort)   Clinical Impression   Criteria for Skilled Therapeutic Interventions Met (OT) Yes, treatment indicated   OT Diagnosis Decline function   OT Problem List-Impairments impacting ADL activity tolerance impaired;balance;mobility;strength;pain  (dizziness)   Assessment of Occupational Performance 5 or more Performance Deficits   Identified Performance Deficits LB dressing, bathing, toileting, functional mobility, strenuous IADLs   Planned Therapy Interventions (OT) ADL retraining;IADL retraining;transfer training;home program guidelines;progressive activity/exercise;risk factor education   Clinical Decision Making Complexity (OT) low complexity   Anticipated Equipment Needs Upon Discharge (OT) wheelchair;hospital bed;reacher;wheelchair cushion;commode chair;walker, rolling   Risk & Benefits of therapy have been explained evaluation/treatment results reviewed;care plan/treatment goals reviewed;risks/benefits reviewed;current/potential barriers reviewed;participants voiced agreement with care plan;participants included;patient   OT Total Evaluation Time   OT Eval, Low Complexity Minutes (61586) 21   OT Goals   Therapy Frequency (OT) 5 times/wk   OT Predicted Duration/Target Date for Goal Attainment 10/23/22   OT Goals Lower Body Dressing;Toilet Transfer/Toileting;Transfers;OT Goal 1;OT Goal 2   OT: Lower Body Dressing Modified independent   OT: Transfer Modified independent  (simulated step in shower  transfer)   OT: Toilet Transfer/Toileting Modified independent   OT: Goal 1 Pt will verbalize understanding of at leat 4 energy conseration strategies to use to enhance IADL/BAD independence.   OT: Goal 2 Pt will ambulate at least 100 ft with no more than CGA to progress activity tolerance needed for self-care   Interventions   Interventions Quick Adds Self-Care/Home Management;Therapeutic Activity;Therapeutic Procedures/Exercise   OT Discharge Planning   OT Plan OT: Monitor BP. Progress ambulation in room. LB dressing. Commode vs toileting as BP allows. Energy conservation handout. Paracentesis on 10/10   OT Discharge Recommendation (DC Rec) Transitional Care Facility;home with assist;home   OT Rationale for DC Rec Pt functioning below baseline and will benefit from continued skilled OT to maximize safety and independence.  Pt lives alone. If does DC home would need hospital bed, wheelchair, commode, wheelchair cushion, FWW, home OT/PT/RN, Ax1 with self-cares and mobility   OT Brief overview of current status Pt currently requiring Ax1 for self-cares and short distance mobility. Unable to tolerate ambulation greater than 3 feet due to fatigue and dizziness.   Total Session Time   Total Session Time (sum of timed and untimed services) 21

## 2022-10-09 NOTE — H&P
[facility]    History and Physical - Hospitalist Service       Date of Admission:  10/8/2022  Dictation #: [id number]  Brief Summary (see dictation for more details): 72 year old with progressive ovarin ca with mets being admitted with failure to thrive, dehydration, anorexia being admitted for further supportive therapy and chemo therapy.    Clinically Significant Risk Factors Present on Admission             # Hypoalbuminemia: Albumin = 2.2 g/dL (Ref range: 3.4 - 5.0 g/dL) on admission, will monitor as appropriate                Jasen Middleton MD  Hospitalist Service  [facility]  Securely message with the Vocera Web Console (learn more here)  Text page via [employer] Paging/Directory

## 2022-10-09 NOTE — ED NOTES
Patient's port not drawing. Patient refusing phlebotomy stick or PIV. Patient requesting alteplase for port. Hospitalist paged.

## 2022-10-09 NOTE — PROGRESS NOTES
RECEIVING UNIT ED HANDOFF REVIEW    ED Nurse Handoff Report was reviewed by: Zeke Betancur RN on October 9, 2022 at 8:44 AM

## 2022-10-10 NOTE — PLAN OF CARE
Occupational Therapy Discharge Summary    Reason for therapy discharge:    No further expectations of functional progress.    Progress towards therapy goal(s). See goals on Care Plan in [facility] electronic health record for goal details.  Goals not met.  Barriers to achieving goals:   limited tolerance for therapy. Pt potentially transitioning to hospice - requests no further OT.     Therapy recommendation(s):    No further therapy is recommended.

## 2022-10-10 NOTE — PLAN OF CARE
[id number]  Pt c/o lower abd cramping, taking PRN morphine with little relief. No nausea reported overnight. Abd is distended, abd binder in place. Tele NSR. On 1 L oxygen, was de-satting into the high 80s while sleeping. Port is hep locked. Up with SBA to BSC. Plan for paracentesis today.

## 2022-10-10 NOTE — PLAN OF CARE
Goal Outcome Evaluation:  DATE & TIME: 10/10/22, [id number]  Summary: Ovarian cancer, ascites   Cognitive Concerns/ Orientation : A&O x 4   BEHAVIOR & AGGRESSION TOOL COLOR: GREEN  ABNL VS/O2: VSS on 1 L NC  MOBILITY: SBA   PAIN MANAGMENT: C/O back pain and abdominal discomfort eased after procedure   DIET: Reg  BOWEL/BLADDER: Cont B/B  DRAIN/DEVICES: R chest port HL  TELEMETRY RHYTHM: NSR  SKIN: CDI  TESTS/PROCEDURES: Paracentesis done during shift   D/C DAY/GOALS/PLACE: TBD, potentially going to hospice

## 2022-10-10 NOTE — PROGRESS NOTES
"[facility]  Hospitalist Progress Note  Jasen Middleton MD  10/10/2022    Assessment & Plan   ASSESSMENT:  Nasra Ramey is a 69-year-old independent living with history of ovarian cancer with metastasis, who presents with failure to thrive, dehydration, worsening peritoneal mets, increasing ascites without any obstruction, being admitted for further treatment.     PLAN:     1.  Ovarian cancer progressive with increased abdominal pain with evidence of increased peritoneal mets and ascites.   -- having diarrhea whenever she eats, likely not able to absorb foods  -- initial plans to undergo chemotherapy second line on Tuesday  -- seen by [place] oncology, felt she is not strong enough for chemotherapy  -- s/p therapeutic paracentesis today of 3.5 L, albumin infusion  -- RD consult for nutrition, albumin post fluid is 1.8  -- palliative care consult tomorrow  -- antiemetics, antidiarrheals  -- morphine vs dilaudid for abd pain  2.  Dry eyes.    -- continue the patient on her eyedrops.  3.  History of atrial fibrillation.  -- continue the patient on her flecainide.  4.  History of seasonal allergies.   -- continued on her Flonase and Allegra.  5.  DVT prophylaxis.   -- discontinue heparin due to drop in platelets    Disposition  -- plan for hospice at nursing home vs residential hospice.  -- anticipate on 10/12     CODE STATUS:  FULL.    Interval History   -- s/p paracentesis  -- noted drop in PLT count  -- Gyn/Onc NP note reviewed  -- discussed with RN    -Data reviewed today: I reviewed all new labs and imaging over the last 24 hours. I personally reviewed no images or EKG's today.    Physical Exam    , Blood pressure 118/64, pulse 78, temperature 97  F (36.1  C), temperature source Axillary, resp. rate 16, height 1.778 m (5' 10\"), weight 67.1 kg (148 lb), SpO2 100 %, not currently breastfeeding.  Vitals:    10/08/22 1424 10/10/22 0700   Weight: 62.6 kg (138 lb) 67.1 kg (148 lb)     Vital Signs " with Ranges  Temp:  [97  F (36.1  C)-97.8  F (36.6  C)] 97  F (36.1  C)  Pulse:  [77-85] 78  Resp:  [16-18] 16  BP: (100-118)/(64-74) 118/64  SpO2:  [97 %-100 %] 100 %  I/O's Last 24 hours  I/O last 3 completed shifts:  In: 200 [P.O.:200]  Out: -     Constitutional: Awake, alert, cooperative, no apparent distress  Respiratory: Clear to auscultation bilaterally, no crackles or wheezing  Cardiovascular: Regular rate and rhythm, normal S1 and S2   GI: Normal bowel sounds, soft, mildly distended, diffusely-tender  Skin/Integumen: No rashes, no cyanosis, + edema  Other:      Medications   All medications were reviewed.      albumin human  12.5 g Intravenous Once     carboxymethylcellulose PF  1 drop Both Eyes QPM     flecainide  100 mg Oral Q12H [facility] (08/20)     multivitamin, therapeutic  1 tablet Oral Once per day on Tue Thu Sat     sodium chloride (PF)  10-20 mL Intracatheter Q28 Days     vitamin B complex with vitamin C  1 tablet Oral Daily     Vitamin D (Cholecalciferol)  1 tablet Oral Once per day on Sun Tue Thu Sat        Data   Recent Labs   Lab 10/10/22  0558 10/09/22  0916 10/08/22  1530 10/06/22  2000   WBC 9.4  --  10.4 10.2   HGB 10.0*  --  11.8 11.4*   MCV 96  --  93 93     --  458* 392   NA  --  137 135 136   POTASSIUM  --  4.3 4.2 4.6   CHLORIDE  --  105 101 99   CO2  --  24 27 25   BUN  --  18 21 24.4*   CR  --  0.83 0.92 1.26*   ANIONGAP  --  8 7 12   HOLLY  --  7.6* 8.5 8.6*   GLC  --  103* 105* 111*   ALBUMIN  --  1.8* 2.2* 3.0*   PROTTOTAL  --  4.0* 5.1* 5.4*   BILITOTAL  --  0.4 0.4 0.2   ALKPHOS  --  36* 45 56   ALT  --  18 25 32   AST  --  19 22 41*   LIPASE  --   --  123  --        No results found for this or any previous visit (from the past 24 hour(s)).    Jasen Middleton MD  Text Page  (7am to 6pm)

## 2022-10-10 NOTE — PROGRESS NOTES
Palliative consult received. Due to palliative consult volume received over the weekend and today, patient may not be evaluated until Tuesday 10/11 or Wednesday 10/12. Please call with urgent needs. Thanks.     KIMBER Goldsmith [facility]  Contact information available via [facility] Paging/Directory

## 2022-10-10 NOTE — PROGRESS NOTES
[facility]    Oncology Progress Note     Assessment & Plan   Yaneli Ramey is a 72 year old female who was admitted on 10/8/2022 for abdominal pain, nausea, inability to perform self care in the presence of progressive ovarian cancer.     Ovarian cancer:     This is unfortunately progressing, and her functional status is poor.  She was intending to start second line chemotherapy with gemzar and bevacizumab this week, but is now strongly considering transition to hospice.  Given her inability to care for herself properly now, and inability to take in adequate nutrition, she would not be a good candidate for chemotherapy, regardless.  She and I are both concerned about her ability to tolerate more chemo.  She understands that her cancer is very aggressive and is not curable.  Chemo would be utilized  with the intent of controlling disease and extending time, balancing with quality of life.  But she feels that her quality of life currently is very poor, and she has no interest in prolonging this currently.  She is still contemplating hospice, but is trying to process this all mentally before she makes any decisions.  For now, she has transitioned to DNR/DNI, and is willing to talking with palliative care from [facility] about placement in hospice facility.    Long discussion today regarding IV fluids (she declines currently) and TPN (she declines this as well).  She may consider a pleurex catheter for her ascites, but we did discuss the risks and benefits of this as well.   She wants to talk to her niece Elizabeth.  If/when she is ready to discuss hospice, we can obtain a consult.  Otherwise, Palliative Care would be helpful in discussing symptom management/goals of care.  I have a placed an order to Palliative care to discuss the hospice options.     She is particularly interested in possibly donating her body to a GYN ONC specific medical education facility.  She is not interested in general  Medical School donation.  I told her I would have Palliative Care folks investigate whether or not this is a possibility.      2. Malignant ascites - rapidly accumultating.  Paracentesis scheduled for later today.  Can consider pleurex. Has standing order for outpatient already.      3. Poor oral intake/failure to thrive - continue supportive cares.  Seen by nutrition. She declines IV hydration and TPN currently.      4. Dispo - pending.  Unable to live alone and will need to be at a facility.       Principal Problem:    Ovarian cancer, unspecified laterality (H)  Active Problems:    Dehydration    Lightheadedness    Generalized muscle weakness    Malignant ascites      # Pain Assessment:  Current Pain Score 10/10/2022   Patient currently in pain? yes   Pain score (0-10) -   Pain location -   Pain descriptors -       Jordana Londono, APRN CNP, APRN CNP  [facility]  [phone]    Interval History   She is sad, frustrated and tired. Mildly nauseaus, but using compazine and zofran with good effect.  Mouth feels dry.  Pain well managed with morphine.     Physical Exam   Temp: 97  F (36.1  C) Temp src: Axillary BP: 118/64 Pulse: 78   Resp: 16 SpO2: 100 % O2 Device: Nasal cannula Oxygen Delivery: 1.5 LPM  Vitals:    10/08/22 1424 10/10/22 0700   Weight: 62.6 kg (138 lb) 67.1 kg (148 lb)     Vital Signs with Ranges  Temp:  [97  F (36.1  C)-98.7  F (37.1  C)] 97  F (36.1  C)  Pulse:  [77-85] 78  Resp:  [16-18] 16  BP: (100-118)/(64-74) 118/64  SpO2:  [95 %-100 %] 100 %  No intake/output data recorded.    GENERAL: awake, alert, NAD, sitting in chair  CV: RRR, no murmurs  RESPIRATORY: no dyspnea, clear bilat  ABDOMEN: distended, firm, mildly diffusely tender.   EXTREMITY: no edema, neg Farrukh's  SKIN: warm and dry, no jaundice no rashes  NEURO: cranial nerves II-XII grossly intact, motor exam intact    Medications       carboxymethylcellulose PF  1 drop Both Eyes QPM     flecainide  100 mg Oral Q12H [facility] (08/20)     heparin   5-10 mL Intracatheter Q28 Days     heparin ANTICOAGULANT  5,000 Units Subcutaneous Q12H     heparin lock flush  5-10 mL Intracatheter Q24H     multivitamin, therapeutic  1 tablet Oral Once per day on Tue Thu Sat     sodium chloride (PF)  10-20 mL Intracatheter Q28 Days     vitamin B complex with vitamin C  1 tablet Oral Daily     Vitamin D (Cholecalciferol)  1 tablet Oral Once per day on Sun Tue Thu Sat       Data   Results for orders placed or performed during the hospital encounter of 10/08/22 (from the past 24 hour(s))   CBC with platelets   Result Value Ref Range    WBC Count 9.4 4.0 - 11.0 10e3/uL    RBC Count 3.35 (L) 3.80 - 5.20 10e6/uL    Hemoglobin 10.0 (L) 11.7 - 15.7 g/dL    Hematocrit 32.1 (L) 35.0 - 47.0 %    MCV 96 78 - 100 fL    MCH 29.9 26.5 - 33.0 pg    MCHC 31.2 (L) 31.5 - 36.5 g/dL    RDW 14.8 10.0 - 15.0 %    Platelet Count 173 150 - 450 10e3/uL   RBC and Platelet Morphology   Result Value Ref Range    Platelet Assessment (A) Automated Count Confirmed. Platelet morphology is normal.     Automated Count Confirmed. Giant platelets are present.    [place] Cells Slight (A) None Seen    Polychromasia Slight (A) None Seen    RBC Morphology Confirmed RBC Indices

## 2022-10-11 NOTE — PLAN OF CARE
[id number]  Pt up to bathroom with just SBA today.  Miralax given in am per request.  Tele NSR.  Port HL'd.  Tolerating regular diet.  PT consulted.  Plan likely for transition to hospice, unsure of discharge place yet.  SWS and Palliative following to assist with discharge plans.  Pt open to pleurex drain placement, possibly will happen Thursday.  Biotine and nasal spray ordered today.

## 2022-10-11 NOTE — CONSULTS
[facility]  Palliative Care Consultation Note    Patient: Yaneli Ramey  Date of Admission:  10/8/2022    Requesting Clinician / Team: Jordana Londono, KIMBER CNP/[facility].  Reason for consult: Goals of care  Decisional support  Patient and family support    Recommendations:    Goals of Care (see in depth discussion below): Yaneli wants to focus on comfort as priority and not returning to hospital again. She is having a Pleurex catheter placed for recurrent ascites and does not want any further treatments for her cancer. She will need to discharge to facility and is considering TCU vs Hospice facility. She is considering rehab for PT if it could help her maintain her strength. Did caution her that this could cause increased discomfort for her. She would like a PT eval for their recommendations.     Spoke with [facility] Elizabeth Mcfarland as Yaneli wanted me to check in with her regarding our discussion today. Elizabeth had toured [facility] and thought this would be a nice facility for Yaneli to discharge to when the time comes. Elizabeth states funds are adequate to pay for the facility.    Yaneli would like palliative care to return tomorrow to discuss the process of dying and what to expect.     Code status: No CPR / No Intubation    Advanced Care Planning:    Patient has a completed Health Care Directive: Yes, and on file.    Abdominal discomfort/pain related to metastatic cancer, ascites.  - Agree with placement of Pleurex drain for recurrent ascites.  -Agree with morphine solution 5 mg every 2 hours as needed.   -Cool and warm packs for comfort as needed.    Weakness and fatigue related to metastatic cancer. Yaneli is considering possiblity of TCU and if she would be able to gain any strength from participation. Cautioned that this may cause her increased discomfort with her dizziness, weakness and  Cancer related pain. She would like PT to give their opinion.   -Consider PT evaluation for their  recommendations.      Dry mouth related to medications, opioids.   -Start Biotene mouth moisturizer every one hour as needed.  -Ice chips and sips of water.    Patient case was reviewed with Dr Cisneros and Virginia Grace RN.    Radha Landry, [facility]  Contact information available via [facility] Paging/Directory        Thank you for the opportunity to participate in the care of this patient and family. Our team: will continue to follow.     During regular M-F work hours ([phone]) -- if you are not sure who specifically to contact -- please contact us on [facility] Smart Web.     After regular work hours and on weekends/holidays, you can call our answering service at [phone].     Attestation:  Total time on the floor involved in the patient's care: 80 minutes  Total time spent in counseling/care coordination: >50% discussing patient condition, assessment of symptoms, reviewing current status with MD and RN,     Assessments:  Yaneli Ramey is a 72 year old female with PMH significant for ovarian cancer, recurrent ascites, a-fib, osteoarthritis. Came to ED for evaluation of weakness, failure to thrive. CT scan showed increased peritoneal metastasis, and ascites without obstruction. She was admitted on 10/08 for further stabilization before chemotherapy.     Today, the patient was seen for:   Goals of care  Patient and family support    Prognosis, Goals, & Planning:        Prognosis, Goals, and/or Advance Care Planning were addressed today: Yes      Functional Status just prior to hospitalization: 3 (Capable of only limited self-care; needs help with ADLs; in bed/chair >50% of waking hours)          Patient has decision-making capacity today for complex decisions: Yes      Patient's decision making preferences: shared with support from loved ones          I have concerns about the patient/family's health literacy today: No           Coping, Meaning, & Spirituality:   Mood, coping,  and/or meaning in the context of serious illness were addressed today: Yes  Summary/Comments: Yaneli is an atheist but has interest in Baldemar/Evangelical philosophy.     Social:     Living situation: had lived independently but now unable to do so due to weakness.     Key family / caregivers: [facility] is Elizabeth Mcfarland. Also great grand niece Loren.    Occupational history: Forensic psychologist    Financial concerns: Yaneli is concerned over costs of room and board for hospice. will need a facility. [facility] likes [facility] and feels this would be a good fit for Yaneli. Elizabeth states finances would not be problematic.     History of Present Illness:   History gathered today from: patient, medical chart, medical team members  Adopted from H&P:  Yaneli Ramey is a 72 year old female with PMH significant for ovarian cancer, recurrent ascites, a-fib, osteoarthritis. Came to ED for evaluation of weakness, failure to thrive. CT scan showed increased peritoneal metastasis, and ascites without obstruction. She was admitted on 10/08 for further stabilization before chemotherapy with [facility].     In hospital she was evaluated by oncology and due to poor functional status, inability to care for herself independently, poor nutrition. Yaneli expressed that her quality of life was currently poor and did not wish to extend her life in this state. She is considering hospice. Palliative care was consulted to discuss goals of care and offer decisional support. We reviewed the hospice program and setting in which it can occur, costs for room and board. The goal is not to return to hospital, or have further diagnostic testing, labs, IV fluids, etc. Yaneli expressed concern about costs of room and board for hospice. She is still considering rehab and PT at a TCU. We discussed the benefits and burdens of TCU with her  Progressive cancer and possibility of increased discomfort. She said that if it did not work out at rehab she could go into hospice  at this point. We reviewed her current medications and she would like to discontinue her multivitamin and B complex as she does not feel it has helped her peripheral neuropathy. aYneli would like to have a Pleurex drain for comfort and ability to drain ascites out of hospital setting.     Yaneli would like me to return tomorrow for discussion on death and dying process. Also wanted me to speak to her [facility] Elizabeth about our discussion today, which I did. Elizabeth did not feel that TCU would be a good choice for Yaneli and feels that she may be trying to save money by going to TCU. She will connect with Yaneli tomorrow to discuss with her.     Key Palliative Symptom Data:  # Pain severity the last 12 hours: low  # Dyspnea severity the last 12 hours: none  # Nausea severity the last 12 hours: none  # Anxiety severity the last 12 hours: none  We are not helping to manage these symptoms currently in this patient.    Patient is on opioids: assessed and bowels ok/no needed changes to plan of care today.    ROS:  Comprehensive ROS is reviewed and Yaneli is complaining of a dry mouth.     Past Medical History:  Past Medical History:   Diagnosis Date     Arthritis      Asthma     Hx of, no inhalers at home for over 20 years     Complication of anesthesia      Gastro-oesophageal reflux disease      Hemorrhoid      Insomnia      Nasal polyp      Neoplasm of fallopian tube      Nuclear sclerosis      Paresthesias      Paroxysmal atrial fibrillation (H)      Paroxysmal supraventricular tachycardia (H)      Pneumonia      Posterior vitreous detachment     bilateral - left 2012, right 2020     SVT (supraventricular tachycardia) (H)         Past Surgical History:  Past Surgical History:   Procedure Laterality Date     BREAST SURGERY      breast biopsy     BUNIONECTOMY       DAVINCI LYSIS OF ADHESIONS N/A 6/9/2022    Procedure: Davinci Lysis of Adhesions;  Surgeon: Cheryl Martinez MD;  Location:  OR     [facility]  SALPINGO-OOPHORECTOMY INCLUDING BILATERAL Bilateral 6/9/2022    Procedure: BILATERAL SALPINGO OOPHORECTOMY ; OMENTECTOMY ; MULTIPLE PERITONEAL BIOPSIES, BILATERAL URETEROLYSIS, WASHINGS, LYSIS OF ADHESIONS;  Surgeon: Cheryl Martinez MD;  Location:  OR     DISCECTOMY LUMBAR MINIMALLY INVASIVE TWO LEVELS       ENDOSCOPIC SINUS SURGERY       HYSTERECTOMY       LAPAROSCOPY DIAGNOSTIC (GYN) N/A 12/9/2021    Procedure: DIAGNOSTIC LAPAROSCOPY;  Surgeon: Cheryl Martinez MD;  Location:  OR     REPAIR HAMMER TOE       REPAIR LIGAMENT ULNAR COLLATERAL (ELBOW)       REPAIR PTOSIS BILATERAL  12/23/2013    Procedure: REPAIR PTOSIS BILATERAL;  right upper lid ptosis repair and bilateral upper eyelid mechanical ptosis repair;  Surgeon: Santo Choudhury MD;  Location:  [facility]     URETEROLYSIS Bilateral 6/9/2022    Procedure: Ureterolysis;  Surgeon: Cheryl Martinez MD;  Location:  OR         Family History:  Family History   Problem Relation Age of Onset     Emphysema Father      Coronary Artery Disease Early Onset Father      Prostate Cancer Father      Lupus Mother          Allergies:  Allergies   Allergen Reactions     Adhesive Tape      Cromolyn      Estradiol      Hydrocodone Other (See Comments)     Other Drug Allergy (See Comments)      Percodan       Paclitaxel      Percocet [Oxycodone-Acetaminophen]      Septra [Sulfamethoxazole W-Trimethoprim]         Medications:  I have reviewed this patient's medication profile and medications from this hospitalization.     Noted scheduled meds are:    carboxymethylcellulose PF  1 drop Both Eyes QPM     flecainide  100 mg Oral Q12H [facility] (08/20)     heparin  5-10 mL Intracatheter Q28 Days     heparin lock flush  5-10 mL Intracatheter Q24H     multivitamin, therapeutic  1 tablet Oral Once per day on Tue Thu Sat     sodium chloride (PF)  10-20 mL Intracatheter Q28 Days     sodium chloride (PF)  10-20 mL Intracatheter Q28 Days     vitamin B complex with vitamin C  1  tablet Oral Daily     Vitamin D3  25 mcg Oral Once per day on Sun Tue Thu Sat       Noted PRN meds are:  acetaminophen **OR** acetaminophen, alpha-d-galactosidase, docusate sodium, fexofenadine, fluticasone, heparin lock flush, lactase, lidocaine 4%, lidocaine (buffered or not buffered), loperamide, melatonin, morphine, naloxone **OR** naloxone **OR** naloxone **OR** naloxone, ondansetron **OR** ondansetron, polyethylene glycol, polyethylene glycol-propylene glycol PF, prochlorperazine **OR** prochlorperazine **OR** prochlorperazine, simethicone, sodium chloride, sodium chloride (PF), sodium chloride (PF)    Physical Exam:  Vital Signs: Temp: 98  F (36.7  C) Temp src: Axillary BP: 98/59 Pulse: 85   Resp: 20 SpO2: 99 % O2 Device: Nasal cannula Oxygen Delivery: 2 LPM  Weight: 148 lbs 0 oz    Physical Exam  GENERAL:  Alert, no distress, cachectic, weak.  HEAD: Normocephalic atraumatic, alopecia.  SCLERA: Anicteric  EXTREMITIES: Warm; no edema  ABDOMEN:  Soft, rounded.  RESPIRATORY: Breathing unlabored on 2 L.  NEUROLOGIC: Alert.  PSYCH: Inquisitive, cooperative.      Data reviewed:  Recent imaging reviewed.  Results for orders placed or performed during the hospital encounter of 10/08/22   CT Abdomen Pelvis w Contrast    Impression    IMPRESSION:   1.  Increasing peritoneal metastatic disease/ascites.  2.  No evidence of bowel obstruction.   US Paracentesis without Albumin    Impression    IMPRESSION:  1.  Status post ultrasound-guided paracentesis.    RUDY FONTANEZ MD         SYSTEM ID:  [id number]          Lab Results   Component Value Date    WBC 9.4 10/10/2022    WBC 10.4 10/08/2022    WBC 10.2 10/06/2022    HGB 10.0 (L) 10/10/2022    HGB 11.8 10/08/2022    HGB 11.4 (L) 10/06/2022    HCT 32.1 (L) 10/10/2022    HCT 36.8 10/08/2022    HCT 35.6 10/06/2022     10/10/2022     (H) 10/08/2022     10/06/2022     10/09/2022     10/08/2022     10/06/2022    POTASSIUM 4.3 10/09/2022     POTASSIUM 4.2 10/08/2022    POTASSIUM 4.6 10/06/2022    CHLORIDE 105 10/09/2022    CHLORIDE 101 10/08/2022    CHLORIDE 99 10/06/2022    CO2 24 10/09/2022    CO2 27 10/08/2022    CO2 25 10/06/2022    BUN 18 10/09/2022    BUN 21 10/08/2022    BUN 24.4 (H) 10/06/2022    CR 0.83 10/09/2022    CR 0.92 10/08/2022    CR 1.26 (H) 10/06/2022     (H) 10/09/2022     (H) 10/08/2022     (H) 10/06/2022    DD 0.3 01/04/2017    NTBNPI 151 01/04/2017    TROPI <0.015 04/17/2021    TROPI <0.015 04/15/2021    TROPI <0.015 04/15/2021    AST 19 10/09/2022    AST 22 10/08/2022    AST 41 (H) 10/06/2022    ALT 18 10/09/2022    ALT 25 10/08/2022    ALT 32 10/06/2022    ALKPHOS 36 (L) 10/09/2022    ALKPHOS 45 10/08/2022    ALKPHOS 56 10/06/2022    BILITOTAL 0.4 10/09/2022    BILITOTAL 0.4 10/08/2022    BILITOTAL 0.2 10/06/2022      Lab Results   Component Value Date    ALBUMIN 1.8 10/09/2022          Recent Labs   Lab 10/08/22  1533   PH 7.41

## 2022-10-11 NOTE — PROGRESS NOTES
Gyn Oncology    Had a good discussion with Yaneli today about her quick decline in function and poor prognosis.  Discussed that I strongly recommend Hospice facility or TCU with Hospice as well as pleurex catheter.  She is amenable to both Hospice and pleurex.  Eager to talk to palliative care regarding Hospice options.    TEOFILO Martinez MD  Gyn Oncology

## 2022-10-11 NOTE — PLAN OF CARE
[id number]  Pt slept better overnight. Abd less distended/uncomfortable after para. C/o slight lightheadedness when getting up, up with SBA. Port hep locked. Tele NSR. Plan for meeting with palliative. Discharge pending.

## 2022-10-11 NOTE — PLAN OF CARE
Goal Outcome Evaluation:    A/Ox4, VSS on 1L NC. BP running soft. Paracentesis this morning, 3.7L removed. Up SBA. Denies pain/n/v. Regular diet, poor appetite today. Continent B/B. Port HL, blood return noted. Tele NSR. Plan to meet with palliative 10/11 or 10/12. Discharge pending.

## 2022-10-11 NOTE — PROGRESS NOTES
[facility]  Hospitalist Progress Note  Jena Cisneros MD  10/11/2022    Assessment & Plan   ASSESSMENT:  Nasra Ramey is a 69-year-old independent living with history of ovarian cancer with metastasis, who presents with failure to thrive, dehydration, worsening peritoneal mets, increasing ascites without any obstruction, being admitted for further treatment.     PLAN:     1.  Ovarian cancer progressive with increased abdominal pain with evidence of increased peritoneal mets and ascites.   -- having diarrhea whenever she eats, likely not able to absorb foods  -- initial plans to undergo chemotherapy second line on Tuesday  -- seen by [facility], felt she is not strong enough for chemotherapy  -- s/p therapeutic paracentesis today of 3.5 L, albumin infusion  -- RD consult for nutrition, albumin post fluid is 1.8  -- palliative care consult tomorrow  -- antiemetics, antidiarrheals  -- morphine vs dilaudid for abd pain  Again oncology seeing her today and discussed with her about her quick decline in function and poor prognosis.  And patient agreeable to discharge with hospice and Pleurx cath placement  Social work referral for hospice consultation requested today      2.  D ry eyes.    -- continue the patient on her eyedrops.  3.  History of atrial fibrillation.  -- continue the patient on her flecainide.  4.  History of seasonal allergies.   -- continued on her Flonase and Allegra.  5.  DVT prophylaxis.   -- discontinue heparin due to drop in platelets    Disposition  -- plan for hospice at nursing home vs residential hospice.  -- anticipate in the next 2 to 3 days once placement found     CODE STATUS:  FULL.    Discussed with bedside RN and patient today    Jena Cisneros MD   Pager [phone](7AM-6PM)      Interval History   Patient resting comfortably in bed.  Denies any pain currently.  Seen by oncology today and goals of care discussion was was done and patient is agreeable to hospice  "care and Pleurx catheter placement at this point    -Data reviewed today: I reviewed all new labs and imaging over the last 24 hours. I personally reviewed no images or EKG's today.    Physical Exam    , Blood pressure 98/59, pulse 85, temperature 98  F (36.7  C), temperature source Axillary, resp. rate 20, height 1.778 m (5' 10\"), weight 67.1 kg (148 lb), SpO2 99 %, not currently breastfeeding.  Vitals:    10/08/22 1424 10/10/22 0700   Weight: 62.6 kg (138 lb) 67.1 kg (148 lb)     Vital Signs with Ranges  Temp:  [97.8  F (36.6  C)-98.4  F (36.9  C)] 98  F (36.7  C)  Pulse:  [73-87] 85  Resp:  [16-20] 20  BP: ()/(57-62) 98/59  SpO2:  [95 %-100 %] 99 %  I/O's Last 24 hours  I/O last 3 completed shifts:  In: 200 [P.O.:200]  Out: -     Constitutional: Awake, alert, cooperative, no apparent distress  Respiratory: Clear to auscultation bilaterally, no crackles or wheezing  Cardiovascular: Regular rate and rhythm, normal S1 and S2   GI: Normal bowel sounds, soft, mildly distended, diffusely-tender  Skin/Integumen: No rashes, no cyanosis, + edema  Other:      Medications   All medications were reviewed.      carboxymethylcellulose PF  1 drop Both Eyes QPM     flecainide  100 mg Oral Q12H [facility] (08/20)     heparin  5-10 mL Intracatheter Q28 Days     heparin lock flush  5-10 mL Intracatheter Q24H     multivitamin, therapeutic  1 tablet Oral Once per day on Tue Thu Sat     sodium chloride (PF)  10-20 mL Intracatheter Q28 Days     sodium chloride (PF)  10-20 mL Intracatheter Q28 Days     vitamin B complex with vitamin C  1 tablet Oral Daily     Vitamin D3  25 mcg Oral Once per day on Sun Tue Thu Sat        Data   Recent Labs   Lab 10/10/22  0558 10/09/22  0916 10/08/22  1530 10/06/22  2000   WBC 9.4  --  10.4 10.2   HGB 10.0*  --  11.8 11.4*   MCV 96  --  93 93     --  458* 392   NA  --  137 135 136   POTASSIUM  --  4.3 4.2 4.6   CHLORIDE  --  105 101 99   CO2  --  24 27 25   BUN  --  18 21 24.4*   CR  --  0.83 0.92 " 1.26*   ANIONGAP  --  8 7 12   HOLLY  --  7.6* 8.5 8.6*   GLC  --  103* 105* 111*   ALBUMIN  --  1.8* 2.2* 3.0*   PROTTOTAL  --  4.0* 5.1* 5.4*   BILITOTAL  --  0.4 0.4 0.2   ALKPHOS  --  36* 45 56   ALT  --  18 25 32   AST  --  19 22 41*   LIPASE  --   --  123  --        No results found for this or any previous visit (from the past 24 hour(s)).

## 2022-10-12 NOTE — PROGRESS NOTES
SPIRITUAL HEALTH SERVICES (Palliative Focus)  [facility] 87    REFERRAL SOURCE: Pt request for conversation around process of dying and what to expect.    Per Palliative provider, Radha Yaneli asked for palliative follow-up and support regarding the dying process.    Upon introduction in the AM, Yaneli confirmed the request and asked someone to return in the afternoon as her niece was visiting in the morning. Returned this afternoon, and Yaneli expressed needing to rest. Validated her needs and agreed that I would follow-up at another time.    Provided Yaneli with a copy of Gone from [employer].    PLAN: Will follow-up with Yaneli later this week, and communicate care provided with the rest of the Palliative team. Spiritual Health remains available per need/request.    Vanessa Huber  Associate   Palliative Care  Phone: [phone]

## 2022-10-12 NOTE — PROGRESS NOTES
10/12/22 0839   Appointment Info   Signing Clinician's Name / Credentials (PT) Lisa Enrique DPT   Living Environment   People in Home alone   Current Living Arrangements house   Home Accessibility no concerns   Transportation Anticipated car, drives self   Living Environment Comments Pt lives alone in a townhouse, no stairs   Self-Care   Usual Activity Tolerance good   Current Activity Tolerance poor   Equipment Currently Used at Home none   Fall history within last six months no   Activity/Exercise/Self-Care Comment Pt IND at baseline, does not use AD   General Information   Onset of Illness/Injury or Date of Surgery 10/08/22   Referring Physician Jena Cisneros MD   Patient/Family Therapy Goals Statement (PT) Get stronger   Pertinent History of Current Problem (include personal factors and/or comorbidities that impact the POC) Nasra Ramey is a 69-year-old independent living with history of ovarian cancer with metastasis, who presents with failure to thrive, dehydration, worsening peritoneal mets, increasing ascites without any obstruction, being admitted for further treatment   Existing Precautions/Restrictions fall   General Observations Pt sitting on toilet w/ nursing student   Cognition   Affect/Mental Status (Cognition) WFL   Orientation Status (Cognition) oriented x 4   Follows Commands (Cognition) WFL   Pain Assessment   Patient Currently in Pain No   Integumentary/Edema   Integumentary/Edema no deficits were identifed   Posture    Posture Forward head position;Protracted shoulders   Range of Motion (ROM)   Range of Motion ROM is WFL   Strength (Manual Muscle Testing)   Strength (Manual Muscle Testing) Deficits observed during functional mobility   Bed Mobility   Comment, (Bed Mobility) Supine to sit SBA   Transfers   Comment, (Transfers) Sit to stand SBA   Gait/Stairs (Locomotion)   Comment, (Gait/Stairs) Pt amb w/ no AD and CGA   Balance   Balance Comments Good seated and fair standing   Sensory  Examination   Sensory Perception Comments B Neuropathy   Clinical Impression   Criteria for Skilled Therapeutic Intervention Yes, treatment indicated   PT Diagnosis (PT) Impaired gait   Influenced by the following impairments Weakness, impaired balance, decreased activity tolerance   Functional limitations due to impairments Limited functional mobility requiring assist   Clinical Presentation (PT Evaluation Complexity) Stable/Uncomplicated   Clinical Presentation Rationale Based on PMH, current status, and social support   Clinical Decision Making (Complexity) low complexity   Planned Therapy Interventions (PT) balance training;bed mobility training;gait training;strengthening;transfer training;progressive activity/exercise   Risk & Benefits of therapy have been explained evaluation/treatment results reviewed;care plan/treatment goals reviewed;risks/benefits reviewed;current/potential barriers reviewed;participants voiced agreement with care plan;participants included;patient   PT Total Evaluation Time   PT Eval, Low Complexity Minutes (91598) 5   Physical Therapy Goals   PT Frequency 5x/week   PT Predicted Duration/Target Date for Goal Attainment 10/19/22   PT Goals Bed Mobility;Transfers;Gait   PT: Bed Mobility Independent;Supine to/from sit   PT: Transfers Independent;Sit to/from stand   PT: Gait Independent;150 feet   PT Discharge Planning   PT Plan Progress amb distance, WC follow   PT Discharge Recommendation (DC Rec) Transitional Care Facility;home with assist;home with home care physical therapy   PT Rationale for DC Rec Pt is below baseline, currently requires CGA for amb, unable to ambulate further than 15' d/t fatigue. Recommend TCU to increase strength and activity tolerance. In order to return home pt would require 24/7 SBA-CGA, WC for amb >15', shower chair.   PT Brief overview of current status Bed mob SBA, transfers SBA, amb CGA

## 2022-10-12 NOTE — PROGRESS NOTES
"Care Management Follow Up    Length of Stay (days): 4    Expected Discharge Date: 10/13/2022     Concerns to be Addressed:   Discharge planning, information on hospice.     Patient plan of care discussed at interdisciplinary rounds: Yes    Anticipated Discharge Disposition:       Anticipated Discharge Services:    Anticipated Discharge DME:      Patient/family educated on Medicare website which has current facility and service quality ratings:    Education Provided on the Discharge Plan:    Patient/Family in Agreement with the Plan:      Referrals Placed by CM/SW:    Private pay costs discussed: Not applicable    Additional Information:   was consulted to meet with patient and family regarding information regarding hospice.  met with patient who stated she was feeling \"wiped out\" and requested  come back another time.  will continue to follow for discharge.       KIARA Ribeiro, [facility]  [phone]  [facility]             "

## 2022-10-12 NOTE — PLAN OF CARE
[id number]  Pt slept well overnight. Denies pain or nausea. Abd slightly distended. Port is hep locked. Tele NSR. Up with SBA to bathroom. Palliative following for goals of care, leaning toward hospice, would like more information.

## 2022-10-12 NOTE — PROGRESS NOTES
[facility]  Hospitalist Progress Note  Jena Cisneros MD  10/12/2022    Assessment & Plan   ASSESSMENT:  Nasra Ramey is a 69-year-old independent living with history of ovarian cancer with metastasis, who presents with failure to thrive, dehydration, worsening peritoneal mets, increasing ascites without any obstruction, being admitted for further treatment.     PLAN:     1.  Ovarian cancer progressive with increased abdominal pain with evidence of increased peritoneal mets and ascites.   -- having diarrhea whenever she eats, likely not able to absorb foods  -- initial plans to undergo chemotherapy second line on Tuesday  -- seen by [facility], felt she is not strong enough for chemotherapy  -- s/p therapeutic paracentesis today of 3.5 L, albumin infusion  -- RD consult for nutrition, albumin post fluid is 1.8  -- palliative care consult tomorrow  -- antiemetics, antidiarrheals  -- morphine vs dilaudid for abd pain  Again oncology seeing her today and discussed with her about her quick decline in function and poor prognosis.  And patient agreeable to discharge with hospice and Pleurx cath placement  Social work referral for hospice consultation requested today  Pt is still deciding on going to TCU versus hospice care facility       2.  D \ry eyes.    -- continue the patient on her eyedrops.  3.  History of atrial fibrillation.  -- patient wants to wean off of Flecanide reduced dose from 100mg BID to 50mg PO BID     4.  History of seasonal allergies.   -- continued on her Flonase and Allegra.    5.  DVT prophylaxis.   -- discontinued heparin due to drop in platelets    Disposition  -- plan for hospice at nursing home vs residential hospice.  -- anticipate in the next 2 to 3 days once placement found and pleurax catheter placed      CODE STATUS:  DNR/DNI     Discussed with bedside RN and patient today    Jena Cisneros MD   Pager [phone](7AM-6PM)      Interval History      Patient  "resting comfortably in bed.  Denies any pain currently. Wants her flecanide dose to be reduced to 50mg PO BID. No other acute issues since yesterday     -Data reviewed today: I reviewed all new labs and imaging over the last 24 hours. I personally reviewed no images or EKG's today.    Physical Exam    , Blood pressure 119/72, pulse 82, temperature 98.1  F (36.7  C), temperature source Axillary, resp. rate 18, height 1.778 m (5' 10\"), weight 61.7 kg (136 lb 1.6 oz), SpO2 95 %, not currently breastfeeding.  Vitals:    10/10/22 0700 10/11/22 0650 10/12/22 0634   Weight: 67.1 kg (148 lb) 64.2 kg (141 lb 9.6 oz) 61.7 kg (136 lb 1.6 oz)     Vital Signs with Ranges  Temp:  [98.1  F (36.7  C)-98.5  F (36.9  C)] 98.1  F (36.7  C)  Pulse:  [74-86] 82  Resp:  [18] 18  BP: ()/(61-72) 119/72  SpO2:  [94 %-97 %] 95 %  I/O's Last 24 hours  I/O last 3 completed shifts:  In: 360 [P.O.:360]  Out: -     Constitutional: Awake, alert, cooperative, no apparent distress  Respiratory: Clear to auscultation bilaterally, no crackles or wheezing  Cardiovascular: Regular rate and rhythm, normal S1 and S2   GI: Normal bowel sounds, soft, mildly distended, diffusely-tender  Skin/Integumen: No rashes, no cyanosis, + edema  Other:      Medications   All medications were reviewed.      carboxymethylcellulose PF  1 drop Both Eyes QPM     docusate sodium  100 mg Oral BID     flecainide  50 mg Oral Q12H [facility]     heparin  5-10 mL Intracatheter Q28 Days     heparin lock flush  5-10 mL Intracatheter Q24H     sodium chloride (PF)  10-20 mL Intracatheter Q28 Days     sodium chloride (PF)  10-20 mL Intracatheter Q28 Days     Vitamin D3  25 mcg Oral Once per day on Sun Tue Thu Sat        Data   Recent Labs   Lab 10/10/22  0558 10/09/22  0916 10/08/22  1530 10/06/22  2000   WBC 9.4  --  10.4 10.2   HGB 10.0*  --  11.8 11.4*   MCV 96  --  93 93     --  458* 392   NA  --  137 135 136   POTASSIUM  --  4.3 4.2 4.6   CHLORIDE  --  105 101 99 "   CO2  --  24 27 25   BUN  --  18 21 24.4*   CR  --  0.83 0.92 1.26*   ANIONGAP  --  8 7 12   HOLLY  --  7.6* 8.5 8.6*   GLC  --  103* 105* 111*   ALBUMIN  --  1.8* 2.2* 3.0*   PROTTOTAL  --  4.0* 5.1* 5.4*   BILITOTAL  --  0.4 0.4 0.2   ALKPHOS  --  36* 45 56   ALT  --  18 25 32   AST  --  19 22 41*   LIPASE  --   --  123  --        No results found for this or any previous visit (from the past 24 hour(s)).

## 2022-10-12 NOTE — CONSULTS
Patient is on IR schedule Thursday 10/13/22 at 9 am for a tunneled abdominal catheter placement.     -Labs WNL for procedure.    -Orders for NPO and antibiotics have been entered.   -Consent will be done prior to procedure.     Please contact the IR department at [phone] for procedural related questions.     Discussed with Virginia GEIGER today.    Thanks, Latasha [facility] Interventional Radiology CNP ([phone]) (phone [phone])

## 2022-10-12 NOTE — PLAN OF CARE
[phone]  Pt up with SBA in room today, low activity tolerance.  Pt showered with assistance this am.  Eating fair.  Port HL'd.  PT following.  Tele dc'd.  Miralax given in am, colace available if needed.  Plan for pleurex placement in am, will be NPO after midnight.  Palliative and SWS following for discharge planning.

## 2022-10-13 NOTE — PROGRESS NOTES
Chart reviewed    Note plan for discharge with hospice  Diet: Regular    Will provide po intake as pt desires  Available if needed    Estefania Franklin RD, LD  Clinical Dietitian - [facility]   Pager - [phone]

## 2022-10-13 NOTE — PROGRESS NOTES
[facility]    Oncology Progress Note     Assessment & Plan   Yaneli Ramey is a 72 year old female who was admitted on 10/8/2022 for abdominal pain, nausea, inability to perform self care in the presence of progressive ovarian cancer.     1. Ovarian cancer: This is unfortunately progressing, and her functional status is poor.  She understands that her cancer is very aggressive and is not curable.  She is unable to tolerate further chemo, and is transitioning to hospice.  She would like for her niece Elizabeth to connect with the social workers    2. Malignant ascites - Pleurex placed today. Appreciate IR  -Ativan prn for muscle spasm around drain, this will also help with sleeping and anxiety     3. Poor oral intake/failure to thrive - continue supportive cares.  Seen by nutrition, PO intake as tolerated. She declines IV hydration and TPN, with plans to transition to hospice      4. Dispo - pending hospice placement.  Appreciate social work involvement     Will follow peripherally, please reach out with questions/concerns.    Bhavana Box PA-C  GYN ONC  Please message via Wall[employer] or call/text [phone] Monday-Friday between 8A-5P. If after these hours, please contact the on call physician.        Interval History   Continues to have emotional lability in regards to her prognosis. Tearful. Mild pain around pleurex site. Unable to sleep at night. Wants sanjeev Johnson to connect with social work regarding next steps.    Physical Exam   Temp: 97.8  F (36.6  C) Temp src: Oral BP: 98/61 Pulse: 79   Resp: 16 SpO2: 98 % O2 Device: None (Room air) Oxygen Delivery: 2 LPM  Vitals:    10/10/22 0700 10/11/22 0650 10/12/22 0634   Weight: 67.1 kg (148 lb) 64.2 kg (141 lb 9.6 oz) 61.7 kg (136 lb 1.6 oz)     Vital Signs with Ranges  Temp:  [97.8  F (36.6  C)-98.8  F (37.1  C)] 97.8  F (36.6  C)  Pulse:  [79-90] 79  Resp:  [16] 16  BP: ()/(61-74) 98/61  SpO2:  [95 %-100 %] 98 %  I/O last 3  completed shifts:  In: 240 [P.O.:240]  Out: -    Not examined  Data   Results for orders placed or performed during the hospital encounter of 10/08/22 (from the past 24 hour(s))   IR Intraperitoneal Cath Tunnel Ascites    Narrative    IR INTRAPERITONEAL CATH TUNNEL ASCITES  10/13/2022 9:25 AM     HISTORY: Malignant ascites requiring frequent paracenteses.    COMPARISON: CT of the abdomen pelvis dated 10/8/2022    FINDINGS: After obtaining informed consent, the patient was placed in  a supine position on the fluoroscopy table. The inferolateral abdomen  was prepped and draped in the usual sterile manner. 1% lidocaine was  injected for local anesthesia. Under ultrasound guidance, access into  the peritoneal space was obtained using a 5 Estonian needle catheter  system. A wire was curled in the peritoneal space. Over the wire, a 16  Estonian peel-away sheath was placed. Approximately 10 cm superior to  the peritoneal entry site, a small skin incision was made. A 16 Estonian  Pleurx catheter was pulled through this incision to the peritoneal  entry site.. The catheter was then passed through the peel-away sheath  into the peritoneal space. A fluoroscopic image was saved to document  catheter position. A paracentesis was performed. The catheter was  sutured to the tunnel entry site using 0 silk in a pursestring manner.  The peritoneal entry site was closed with deep interrupted stitch  using 3-0 Vicryl. This was supplemented with Dermabond.    I determined this patient to be an appropriate candidate for the  planned sedation and procedure and reassessed the patient immediately  prior to sedation and procedure. Moderate intravenous conscious  sedation was supervised by me. The patient was independently monitored  by a registered nurse assigned to the Department of radiology using  automated blood pressure, EKG and pulse oximetry. The patient  tolerated the procedure well. There were no immediate postprocedure  complications.  The patient's vital signs were monitored by radiology  nursing staff under my supervision and remained stable throughout the  study. Radiation dose for this scan was reduced using automated  exposure control, adjustment of the mA and/or kV according to patient  size, or iterative reconstruction technique.    MEDICATIONS: 2 mg Versed    SEDATION TIME: 30 minutes    FLUOROSCOPY TIME: 0.4 minutes    FLUOROSCOPIC DOSE Air Kerma mGY : 5.27    NUMBER OF FLUOROSCOPIC IMAGES: 2      Impression    IMPRESSION: Ultrasound and fluoroscopic guided tunneled abdominal  Pleurx catheter placed as described above.     LEROY GAVIRIA MD         SYSTEM ID:  [id number]

## 2022-10-13 NOTE — IR NOTE
Interventional Radiology Intra-procedural Nursing Note    Patient Name: Yaneli Ramey  Medical Record Number: [id number]  Today's Date: October 13, 2022    Procedure: Tunneled abdominal catheter placement with moderate sedation  Start time: 9:00  End time: 0:21  Report provided to: FELY Sanford  Patient depart time and location: 0:38 to 8820    Note: Patient entered Interventional Radiology Suite number 1 via cart. Patient awake, alert and orientated. Assisted onto procedural table in supine position. Prepped and draped.  Dr. Parra in room. Time out and procedure started. Vital signs stable. Telemetry reading Sinus Rhythm.    Procedure well tolerated by patient without complications. Procedure end with debrief by Dr. Parra.  Transparent dressing applied to right abdominal interventional procedure access site.    Administered medication totals:  Lidocaine 1% 12 mL Intradermal  Versed 2 mg IVP      Last dose of sedation administered at 09:01.

## 2022-10-13 NOTE — CONSULTS
Care Management Initial Consult    General Information  Assessment completed with: FamilyMatt  Type of CM/SW Visit: Initial Assessment    Primary Care Provider verified and updated as needed: Yes   Readmission within the last 30 days: no previous admission in last 30 days      Reason for Consult: discharge planning, end of life/hospice  Advance Care Planning:            Communication Assessment  Patient's communication style: spoken language (English or Bilingual)    Hearing Difficulty or Deaf: no   Wear Glasses or Blind: no    Cognitive  Cognitive/Neuro/Behavioral: WDL                      Living Environment:   People in home: alone     Current living Arrangements: house      Able to return to prior arrangements: yes  Living Arrangement Comments: Patient will need to go to a facility on hosipce    Family/Social Support:  Care provided by: self  Provides care for: no one, unable/limited ability to care for self  Marital Status: Single  Other (specify) (Yvan)          Description of Support System: Supportive, Involved    Support Assessment: Adequate family and caregiver support, Adequate social supports    Current Resources:   Patient receiving home care services:       Community Resources:    Equipment currently used at home: none  Supplies currently used at home:      Employment/Financial:  Employment Status:          Financial Concerns:             Lifestyle & Psychosocial Needs:  Social Determinants of Health     Tobacco Use: Medium Risk     Smoking Tobacco Use: Former     Smokeless Tobacco Use: Never     Passive Exposure: Not on file   Alcohol Use: Not on file   Financial Resource Strain: Not on file   Food Insecurity: Not on file   Transportation Needs: Not on file   Physical Activity: Not on file   Stress: Not on file   Social Connections: Not on file   Intimate Partner Violence: Not on file   Depression: Not on file   Housing Stability: Not on file       Functional Status:  Prior to admission  "patient needed assistance:              Mental Health Status:          Chemical Dependency Status:                Values/Beliefs:  Spiritual, Cultural Beliefs, [employer] Practices, Values that affect care:                 Additional Information:  Writer received consult for discharge planning and hospice discharge. Writer reviewed notes and patient prefers SW to speak with sanjeev Johnson for discharge planning. Writer called Elizabeth and introduced self and role. Elizabeth states that she understands her aunt needs hospice and they were discussing this last week. Per Elizabeth, her aunt does not want to use the money she has in her account for hospice as that was supposed to be her \"fun money\". Elizabeth states they would ideally like [place] at [place] [facility] [place] and plan on doing private pay. Writer also brought up [facility]. Elizabeth wants to think about it, talk with the patient and will get back to SW later today or tomorrow to discuss further.     SW will continue to follow    DESMOND Duran        "

## 2022-10-13 NOTE — PRE-PROCEDURE
GENERAL PRE-PROCEDURE:   Procedure:  Tunneled abdominal catheter placement with moderate sedation  Date/Time:  10/13/2022 7:50 AM    Written consent obtained?: Yes    Risks and benefits: Risks, benefits and alternatives were discussed    Consent given by:  Patient  Patient states understanding of procedure being performed: Yes    Patient's understanding of procedure matches consent: Yes    Procedure consent matches procedure scheduled: Yes    Expected level of sedation:  Moderate  Appropriately NPO:  Yes  ASA Class:  3  Mallampati  :  Grade 1- soft palate, uvula, tonsillar pillars, and posterior pharyngeal wall visible  Lungs:  Lungs clear with good breath sounds bilaterally  Heart:  Normal heart sounds and rate and systolic murmur  History & Physical reviewed:  History and physical reviewed and no updates needed  Statement of review:  I have reviewed the lab findings, diagnostic data, medications, and the plan for sedation

## 2022-10-13 NOTE — UTILIZATION REVIEW
Admission Status; Secondary Review Determination    Under the authority of the Utilization Management Committee, the utilization review process indicated a secondary review on the above patient. The review outcome is based on review of the medical records, discussions with staff, and applying clinical experience noted on the date of the review.    (x) Inpatient Status Appropriate - This patient's medical care is consistent with medical management for inpatient care and reasonable inpatient medical practice.    RATIONALE FOR DETERMINATION: 72-year-old female with known ovarian cancer status post surgical intervention 6/9/2022 with significant carcinomatosis and recurrent ascites.  Given of September patient began developing severe abdominal pain with distention and anorexia with progression of her carcinomatosis.  On 9/25/2022 patient's albumin 2.8.  Patient due for follow-up chemotherapy but due to ongoing anorexia, nausea and low-grade fever with concern for dehydration presented to the emergency room 2 days prior to admission where patient underwent another paracentesis with infusion of albumin and IV fluids.  Unfortunately within 2 days patient continues to have refractive symptoms of anorexia, abdominal distention and pain with nausea and weakness requiring hospitalization.  Patient is felt to have rapidly progressing cancer resistant to prior chemotherapy initiatives with rapid decline in performance status, albumin level dropped to 2.2 on presentation and 1.8 the following morning after IV fluids with poor p.o. intake.  The severity and rapidity of patient's disease progression requiring consultative efforts with internal medicine, oncology as well as gynecology/oncology as well as time to review the consequences of the disease progression.  Patient requiring Pleurx catheter due to the significant large amount of recurrent ascites symptomatic as well as deliberation of palliative care and probable hospice  with a low likelihood of tolerating further chemotherapy.  The severity of her disease and the rapid progression of these events are such that patient required multiple days in the hospital for initial management, consultation and delivered of process as well as intervening with palliative care measures prior to safe discharge appropriate for inpatient care.    At the time of admission with the information available to the attending physician more than 2 nights Hospital complex care was anticipated, based on patient risk of adverse outcome if treated as outpatient and complex care required. Inpatient admission is appropriate based on the Medicare guidelines.    This document was produced using voice recognition software    The information on this document is developed by the utilization review team in order for the business office to ensure compliance. This only denotes the appropriateness of proper admission status and does not reflect the quality of care rendered.    The definitions of Inpatient Status and Observation Status used in making the determination above are those provided in the CMS Coverage Manual, Chapter 1 and Chapter 6, section 70.4.    Sincerely,    Jay Gutierrez MD  Utilization Review  Physician Advisor  [facility].

## 2022-10-13 NOTE — PLAN OF CARE
Goal Outcome Evaluation:           1900 - 0730  A&Ox4, sad demeanor. VSS on RA, reported night sweats throughout shift. Up SBA, low activity tolerance. C/o abdominal discomfort d/t constipation and gas buildup - bowel meds offered, pt refused. Port Hep locked. NPO @ 0100 for pleurX drain placement at 0900 this AM in IR. Palliative and SW following for discharge plan to TCU.

## 2022-10-13 NOTE — PROGRESS NOTES
[facility]  Hospitalist Progress Note  Jena Cisneros MD  10/13/2022    Assessment & Plan   ASSESSMENT:  Nasra Ramey is a 69-year-old independent living with history of ovarian cancer with metastasis, who presents with failure to thrive, dehydration, worsening peritoneal mets, increasing ascites without any obstruction, being admitted for further treatment.     PLAN:     1.  Ovarian cancer progressive with increased abdominal pain with evidence of increased peritoneal mets and ascites.  REcurrent ascites S/p pleurax cathter placement on 10/13/22   -- having diarrhea whenever she eats, likely not able to absorb foods  -- initial plans to undergo chemotherapy second line on Tuesday  -- seen by [facility], felt she is not strong enough for chemotherapy  -- s/p therapeutic paracentesis today of 3.5 L, albumin infusion  -- RD consult for nutrition, albumin post fluid is 1.8  -- palliative care consult tomorrow  -- antiemetics, antidiarrheals  -- morphine vs dilaudid for abd pain  Again oncology seeing her today and discussed with her about her quick decline in function and poor prognosis.  And patient agreeable to discharge with hospice and Pleurx cath placement  pleurax cath placed by IR on 10/13/22   Social work referral for hospice consultation requested today  Pt is still deciding on going to TCU versus hospice care facility       2.  D \ry eyes.    -- continue the patient on her eyedrops.  3.  History of atrial fibrillation.  -- patient wants to wean off of Flecanide reduced dose from 100mg BID to 50mg PO BID     4.  History of seasonal allergies.   -- continued on her Flonase and Allegra.    5.  DVT prophylaxis.   -- discontinued heparin due to drop in platelets    Disposition  -- plan for hospice at nursing home vs residential hospice.  -- anticipate in the next 2 to 3 days once placement found and pleurax catheter placed      CODE STATUS:  DNR/DNI     Discussed with bedside RN and  "patient today    Jena Cisneros MD   Pager [phone](7AM-6PM)      Interval History      Patient resting comfortably in bed.  C/o pain at the cathter site. C/o constipation.  No other acute issues since yesterday     -Data reviewed today: I reviewed all new labs and imaging over the last 24 hours. I personally reviewed no images or EKG's today.    Physical Exam    , Blood pressure 98/61, pulse 79, temperature 97.8  F (36.6  C), resp. rate 16, height 1.778 m (5' 10\"), weight 61.7 kg (136 lb 1.6 oz), SpO2 98 %, not currently breastfeeding.  Vitals:    10/10/22 0700 10/11/22 0650 10/12/22 0634   Weight: 67.1 kg (148 lb) 64.2 kg (141 lb 9.6 oz) 61.7 kg (136 lb 1.6 oz)     Vital Signs with Ranges  Temp:  [97.8  F (36.6  C)-98.8  F (37.1  C)] 97.8  F (36.6  C)  Pulse:  [79-90] 79  Resp:  [16] 16  BP: ()/(61-74) 98/61  SpO2:  [95 %-100 %] 98 %  I/O's Last 24 hours  I/O last 3 completed shifts:  In: 240 [P.O.:240]  Out: -     Constitutional: Awake, alert, cooperative, no apparent distress  Respiratory: Clear to auscultation bilaterally, no crackles or wheezing  Cardiovascular: Regular rate and rhythm, normal S1 and S2   GI: Normal bowel sounds, soft, mildly distended, diffusely-tender  Skin/Integumen: No rashes, no cyanosis, + edema  Other:      Medications   All medications were reviewed.      carboxymethylcellulose PF  1 drop Both Eyes QPM     docusate sodium  100 mg Oral BID     flecainide  50 mg Oral Q12H [facility] (08/20)     heparin  5-10 mL Intracatheter Q28 Days     heparin lock flush  5-10 mL Intracatheter Q24H     polyethylene glycol  17 g Oral BID     sodium chloride (PF)  10-20 mL Intracatheter Q28 Days     sodium chloride (PF)  10-20 mL Intracatheter Q28 Days     Vitamin D3  25 mcg Oral Once per day on Sun Tue Thu Sat        Data   Recent Labs   Lab 10/10/22  0558 10/09/22  0916 10/08/22  1530 10/06/22  2000   WBC 9.4  --  10.4 10.2   HGB 10.0*  --  11.8 11.4*   MCV 96  --  93 93     --  458* 392   NA  " --  137 135 136   POTASSIUM  --  4.3 4.2 4.6   CHLORIDE  --  105 101 99   CO2  --  24 27 25   BUN  --  18 21 24.4*   CR  --  0.83 0.92 1.26*   ANIONGAP  --  8 7 12   HOLLY  --  7.6* 8.5 8.6*   GLC  --  103* 105* 111*   ALBUMIN  --  1.8* 2.2* 3.0*   PROTTOTAL  --  4.0* 5.1* 5.4*   BILITOTAL  --  0.4 0.4 0.2   ALKPHOS  --  36* 45 56   ALT  --  18 25 32   AST  --  19 22 41*   LIPASE  --   --  123  --        Recent Results (from the past 24 hour(s))   IR Intraperitoneal Cath Tunnel Ascites    Narrative    IR INTRAPERITONEAL CATH TUNNEL ASCITES  10/13/2022 9:25 AM     HISTORY: Malignant ascites requiring frequent paracenteses.    COMPARISON: CT of the abdomen pelvis dated 10/8/2022    FINDINGS: After obtaining informed consent, the patient was placed in  a supine position on the fluoroscopy table. The inferolateral abdomen  was prepped and draped in the usual sterile manner. 1% lidocaine was  injected for local anesthesia. Under ultrasound guidance, access into  the peritoneal space was obtained using a 5 Citizen of [place] needle catheter  system. A wire was curled in the peritoneal space. Over the wire, a 16  Citizen [place] peel-away sheath was placed. Approximately 10 cm superior to  the peritoneal entry site, a small skin incision was made. A 16 Citizen of [place]  Pleurx catheter was pulled through this incision to the peritoneal  entry site.. The catheter was then passed through the peel-away sheath  into the peritoneal space. A fluoroscopic image was saved to document  catheter position. A paracentesis was performed. The catheter was  sutured to the tunnel entry site using 0 silk in a pursestring manner.  The peritoneal entry site was closed with deep interrupted stitch  using 3-0 Vicryl. This was supplemented with Dermabond.    I determined this patient to be an appropriate candidate for the  planned sedation and procedure and reassessed the patient immediately  prior to sedation and procedure. Moderate intravenous conscious  sedation was  supervised by me. The patient was independently monitored  by a registered nurse assigned to the Department of radiology using  automated blood pressure, EKG and pulse oximetry. The patient  tolerated the procedure well. There were no immediate postprocedure  complications. The patient's vital signs were monitored by radiology  nursing staff under my supervision and remained stable throughout the  study. Radiation dose for this scan was reduced using automated  exposure control, adjustment of the mA and/or kV according to patient  size, or iterative reconstruction technique.    MEDICATIONS: 2 mg Versed    SEDATION TIME: 30 minutes    FLUOROSCOPY TIME: 0.4 minutes    FLUOROSCOPIC DOSE Air Kerma mGY : 5.27    NUMBER OF FLUOROSCOPIC IMAGES: 2      Impression    IMPRESSION: Ultrasound and fluoroscopic guided tunneled abdominal  Pleurx catheter placed as described above.     LEROY GAVIRIA MD         SYSTEM ID:  [id number]

## 2022-10-14 NOTE — PROGRESS NOTES
Care Management Follow Up    Length of Stay (days): 6    Expected Discharge Date: 10/15/2022     Concerns to be Addressed: discharge planning, other (see comments) (Hospice)     Patient plan of care discussed at interdisciplinary rounds: Yes    Anticipated Discharge Disposition: Hospice     Anticipated Discharge Services: None  Anticipated Discharge DME: None    Patient/family educated on Medicare website which has current facility and service quality ratings: yes  Education Provided on the Discharge Plan:    Patient/Family in Agreement with the Plan: yes    Referrals Placed by CM/SW:    Private pay costs discussed: private room/amenity fees    Additional Information:  Writer met with patient and her niece this morning to discuss discharge plans. Patient would like to discharge to [facility] with [facility]. Writer educated on the fees with [facility] and patient and niece and both are in agreement. Referral sent to check bed availability and referral sent to [facility]. Patient was very emotional in talking about her journey and her oncology team. Patient states it will be a hard goodbye to her team that helped and assisted her along the way. She states that she is hoping that her team continues to follow and check in on her when she goes on hospice. Patient states she is starting to accept that fact that she is at the end but is struggling. Writer updated  to possibly visit patient. Referral sent     DESMOND Duran

## 2022-10-14 NOTE — PLAN OF CARE
Goal Outcome Evaluation:         1900 - 0730  A&Ox4, pleasant mood. VSS on RA. Reported continued constipation and gas buildup. Miralax given yesterday AM with no relief yet, pt declined further bowel meds. Tolerating regular diet. Up SBA, low activity tolerance. R chest port Hep locked. R pleurX drain placed yesterday - dressing CDI. Moderate pain to drain site and L shoulder managed with PRN 2.5mg Morphine. Plan for SW to discuss hospice options with patient and family today. Possible discharge to TCU before going to hospice facility.

## 2022-10-14 NOTE — PROGRESS NOTES
[facility]  Hospitalist Progress Note  Jena Cisneros MD  10/14/2022    Assessment & Plan   ASSESSMENT:  Nasra Ramey is a 69-year-old independent living with history of ovarian cancer with metastasis, who presents with failure to thrive, dehydration, worsening peritoneal mets, increasing ascites without any obstruction, being admitted for further treatment.     PLAN:     1.  Ovarian cancer progressive with increased abdominal pain with evidence of increased peritoneal mets and ascites.  REcurrent ascites S/p pleurax cathter placement on 10/13/22   -- having diarrhea whenever she eats, likely not able to absorb foods  -- initial plans to undergo chemotherapy second line on Tuesday  -- seen by [facility], felt she is not strong enough for chemotherapy  -- s/p therapeutic paracentesis today of 3.5 L, albumin infusion  -- RD consult for nutrition, albumin post fluid is 1.8  -- palliative care consult tomorrow  -- antiemetics, antidiarrheals  -- morphine vs dilaudid for abd pain  Again oncology seeing her today and discussed with her about her quick decline in function and poor prognosis.  And patient agreeable to discharge with hospice and Pleurx cath placement  pleurax cath placed by IR on 10/13/22   Social work referral for hospice consultation requested today  Patient and her aunt and her niece Elizabeth met with the  and made the decision to go to [facility] when bed is available  Patient is complaining of constipation  Dulcolax suppository ordered today  On MiraLAX daily increased to twice a day but patient only wants to take it once a day  Also on Colace 100 mg twice daily    2.  D \ry eyes.    -- continue the patient on her eyedrops.  3.  History of atrial fibrillation.  -- patient wants to wean off of Flecanide reduced dose from 100mg BID to 50mg PO BID     4.  History of seasonal allergies.   -- continued on her Flonase and Allegra.    5.  DVT prophylaxis.   --  "discontinued heparin due to drop in platelets    Disposition  -- plan is for her to go to discharge to [facility]  when bed is available  -- anticipate in the next 2 to 3 days once placement found and pleurax catheter placed      CODE STATUS:  DNR/DNI     Discussed with bedside RN and patient today    Jena Cisneros MD   Pager [phone](7AM-6PM)      Interval History      Patient resting comfortably in bed.  C/o pain at the cathter site. C/o constipation.  No other acute issues since yesterday     -Data reviewed today: I reviewed all new labs and imaging over the last 24 hours. I personally reviewed no images or EKG's today.    Physical Exam    , Blood pressure 99/65, pulse 85, temperature 98.1  F (36.7  C), temperature source Oral, resp. rate 16, height 1.778 m (5' 10\"), weight 61.7 kg (136 lb 1.6 oz), SpO2 97 %, not currently breastfeeding.  Vitals:    10/10/22 0700 10/11/22 0650 10/12/22 0634   Weight: 67.1 kg (148 lb) 64.2 kg (141 lb 9.6 oz) 61.7 kg (136 lb 1.6 oz)     Vital Signs with Ranges  Temp:  [98.1  F (36.7  C)] 98.1  F (36.7  C)  Pulse:  [83-85] 85  Resp:  [16] 16  BP: ()/(56-65) 99/65  SpO2:  [97 %-98 %] 97 %  I/O's Last 24 hours  I/O last 3 completed shifts:  In: -   Out: 1900 [Drains:1900]    Constitutional: Awake, alert, cooperative, no apparent distress  Respiratory: Clear to auscultation bilaterally, no crackles or wheezing  Cardiovascular: Regular rate and rhythm, normal S1 and S2   GI: Normal bowel sounds, soft, mildly distended, diffusely-tender  Skin/Integumen: No rashes, no cyanosis, + edema  Other:      Medications   All medications were reviewed.      carboxymethylcellulose PF  1 drop Both Eyes QPM     docusate sodium  100 mg Oral BID     flecainide  50 mg Oral Q12H [facility] (08/20)     heparin  5-10 mL Intracatheter Q28 Days     heparin lock flush  5-10 mL Intracatheter Q24H     polyethylene glycol  17 g Oral BID     sodium chloride (PF)  10-20 mL Intracatheter Q28 Days     sodium " chloride (PF)  10-20 mL Intracatheter Q28 Days     Vitamin D3  25 mcg Oral Once per day on Sun Tue Thu Sat        Data   Recent Labs   Lab 10/10/22  0558 10/09/22  0916 10/08/22  1530   WBC 9.4  --  10.4   HGB 10.0*  --  11.8   MCV 96  --  93     --  458*   NA  --  137 135   POTASSIUM  --  4.3 4.2   CHLORIDE  --  105 101   CO2  --  24 27   BUN  --  18 21   CR  --  0.83 0.92   ANIONGAP  --  8 7   HOLLY  --  7.6* 8.5   GLC  --  103* 105*   ALBUMIN  --  1.8* 2.2*   PROTTOTAL  --  4.0* 5.1*   BILITOTAL  --  0.4 0.4   ALKPHOS  --  36* 45   ALT  --  18 25   AST  --  19 22   LIPASE  --   --  123       No results found for this or any previous visit (from the past 24 hour(s)).

## 2022-10-14 NOTE — PROGRESS NOTES
Palliative Care Daily Progress Note      Palliative Care was consulted for decisional support and goals of care. At this time goals are clear and there is minimal symptom burden, so we will sign off. Please feel free to reconsult us if we can be helpful in the future. Thank you for the opportunity to be involved in the care of this patient.    KIMBER Monsalve, CNS  Palliative Care Consult Team

## 2022-10-14 NOTE — PROGRESS NOTES
"[facility]  Palliative Care Spiritual Assessment    Patient: Yaneli Ramey  Date of Admission:  10/8/2022      Summary and Recommendations:  Pt Yaneli is still processing how quickly her illness has progressed.  She wants to have as much information as she can about the dying process, acknowledging that she cannot be fully prepared.  Yaneli does not subscribe to any particular Mandaeism ideology or spiritual practice - but has taken comfort in recently reading The [employer] Invitations.  Her niece, Elizabeth, is a great support to her.  I will follow for emotional support while Palliative Care is consulted.      Vanessa Huber  Associate   Palliative Care  Phone: [phone]    ASSESSMENTS:  Visit with Yaneli and her niece, Elizabeth, who was present bedside.    Distress:  Distress in the context of serious illness was assessed today:    Existential/spiritual/emotional distress: Yes; mild. Yaneli reflected on concepts of \"control\" and \"surrender.\" She expressed endeavoring to live a different way during this time of transition between living and dying. She named relying heavily on cognitive functioning throughout her life. \"Now I need to access something else,\" she shared.    Lutheran distress:  No.    Social/economic/relational distress:  No; Yaneli named being surprised by who has stepped forward to offer support, and surprised by those she expected to - but haven't. She's been able to experience reconciliation within some relationships.    Coping, Meaning, & Spirituality:   Self-awareness and insight is important to Yaneli, she shared. She is actively processing each step of this journey. Together, we considered Gone from My Sight. She named appreciation for a \"general roadmap\" of what her body might go through. She considered her sense of self and preparing for \"dying of the self.\" She reflected on her thoughts around energy remaining after death and releasing conscious experience. Offered a " mantra of letting go of the body/and of the self.    Goals:  Yaneli anticipates discharging to a hospice facility.    Interventions:  I offered listening presence, meaning-based exploration of distress, goals of care, and coping.    PLAN: Spiritual Health remains available per need/request.

## 2022-10-14 NOTE — PLAN OF CARE
"Goal Outcome Evaluation:                      Pt A/Ox4, tearful at times.  Vitally stable.  No BM since 10/10, dulcolax suppository attempted, pt declined enema, but did try 1/2 dose of lactulose.  Met with niece to put affairs in order.  Pt states, \"I have to stop trying to control it and just accept it.\"  Up SBA in room, but declined PT d/t feel uncomfortable from constipation.  PleurX drain in place.  Plan for possible discharge with hospice when medically ready.  "

## 2022-10-15 NOTE — PLAN OF CARE
Goal Outcome Evaluation:  Pt A/Ox4. VSS on RA, pain ~3 most of day, Morphine PRN x 4. Pt claims 3 BM overnight with ample flatus. Pt denied morning colace, lactulose and 2nd miralax. Up SBA in room, no bed alarm- calls appropriately. PleurX drain in place, 1200 mL serous fluid drained with relief, dressing changed CDI. Pt complaints of being very gassy- simethicone order changed to PRN Q6. Colace PRN x 1.   Plan for possible discharge with hospice when medically ready.

## 2022-10-15 NOTE — PLAN OF CARE
Goal Outcome Evaluation:                    10/14/22-10/15/22 [id number]  Pt A/Ox4. VSS on RA.  No BM this shift, pt declined bedtime colace, lactulose, and Miralax. Up SBA in room, no bed alarm - calls appropriately. PleurX drain in place.  Plan for possible discharge with hospice when medically ready.

## 2022-10-15 NOTE — PROGRESS NOTES
[facility]  Hospitalist Progress Note  Jena Cisneros MD  10/15/2022    Assessment & Plan   ASSESSMENT:  Nasra Ramey is a 69-year-old independent living with history of ovarian cancer with metastasis, who presents with failure to thrive, dehydration, worsening peritoneal mets, increasing ascites without any obstruction, being admitted for further treatment.     PLAN:     1.  Ovarian cancer progressive with increased abdominal pain with evidence of increased peritoneal mets and ascites.  REcurrent ascites S/p pleurax cathter placement on 10/13/22   -- having diarrhea whenever she eats, likely not able to absorb foods  -- initial plans to undergo chemotherapy second line on Tuesday  -- seen by [place] [facility], felt she is not strong enough for chemotherapy  -- s/p therapeutic paracentesis today of 3.5 L, albumin infusion  -- RD consult for nutrition, albumin post fluid is 1.8  -- palliative care consult tomorrow  -- antiemetics, antidiarrheals  -- morphine vs dilaudid for abd pain  Again oncology seeing her today and discussed with her about her quick decline in function and poor prognosis.  And patient agreeable to discharge with hospice and Pleurx cath placement  pleurax cath placed by IR on 10/13/22   Social work referral for hospice consultation requested today  Patient and her aunt and her niece Elizabeth met with the  and made the decision to go to [facility] when bed is available  Drain Pleurax catheter PRN for comfort     Patient is complaining of constipation  Dulcolax suppository ordered today  On MiraLAX daily increased to twice a day but patient only wants to take it once a day  Also on Colace 100 mg twice daily  Lactulose added bid PRN   Pt had BM last night     2.  D \ry eyes.    -- continue the patient on her eyedrops.  3.  History of atrial fibrillation.  -- patient wants to wean off of Flecanide reduced dose from 100mg BID to 50mg PO BID     4.  History of  "seasonal allergies.   -- continued on her Flonase and Allegra.    5.  DVT prophylaxis.   -- discontinued heparin due to drop in platelets    Disposition  -- plan is for her to go to discharge to [facility]  when bed is available  -- anticipate in the next 2 to 3 days once placement found and pleurax catheter placed      CODE STATUS:  DNR/DNI     Discussed with bedside RN and patient today    Jena Cisneros MD   Pager [phone](7AM-6PM)      Interval History      Patient resting comfortably in bed.  C/o pain at the cathter site. Had a BM last night .  No other acute issues since yesterday     -Data reviewed today: I reviewed all new labs and imaging over the last 24 hours. I personally reviewed no images or EKG's today.    Physical Exam    , Blood pressure 100/63, pulse 86, temperature 98  F (36.7  C), temperature source Axillary, resp. rate 16, height 1.778 m (5' 10\"), weight 62.3 kg (137 lb 4.8 oz), SpO2 94 %, not currently breastfeeding.  Vitals:    10/11/22 0650 10/12/22 0634 10/15/22 0700   Weight: 64.2 kg (141 lb 9.6 oz) 61.7 kg (136 lb 1.6 oz) 62.3 kg (137 lb 4.8 oz)     Vital Signs with Ranges  Temp:  [96.1  F (35.6  C)-98  F (36.7  C)] 98  F (36.7  C)  Pulse:  [80-86] 86  Resp:  [16] 16  BP: (100-105)/(58-63) 100/63  SpO2:  [94 %] 94 %  I/O's Last 24 hours  No intake/output data recorded.    Constitutional: Awake, alert, cooperative, no apparent distress  Respiratory: Clear to auscultation bilaterally, no crackles or wheezing  Cardiovascular: Regular rate and rhythm, normal S1 and S2   GI: Normal bowel sounds, soft, mildly distended, diffusely-tender  Skin/Integumen: No rashes, no cyanosis, + edema  Other:      Medications   All medications were reviewed.      carboxymethylcellulose PF  1 drop Both Eyes QPM     docusate sodium  100 mg Oral BID     flecainide  50 mg Oral Q12H [facility] (08/20)     heparin  5-10 mL Intracatheter Q28 Days     heparin lock flush  5-10 mL Intracatheter Q24H     polyethylene glycol  17 " g Oral BID     sodium chloride (PF)  10-20 mL Intracatheter Q28 Days     sodium chloride (PF)  10-20 mL Intracatheter Q28 Days     Vitamin D3  25 mcg Oral Once per day on Sun Tue Thu Sat        Data   Recent Labs   Lab 10/10/22  0558 10/09/22  0916 10/08/22  1530   WBC 9.4  --  10.4   HGB 10.0*  --  11.8   MCV 96  --  93     --  458*   NA  --  137 135   POTASSIUM  --  4.3 4.2   CHLORIDE  --  105 101   CO2  --  24 27   BUN  --  18 21   CR  --  0.83 0.92   ANIONGAP  --  8 7   HOLLY  --  7.6* 8.5   GLC  --  103* 105*   ALBUMIN  --  1.8* 2.2*   PROTTOTAL  --  4.0* 5.1*   BILITOTAL  --  0.4 0.4   ALKPHOS  --  36* 45   ALT  --  18 25   AST  --  19 22   LIPASE  --   --  123       No results found for this or any previous visit (from the past 24 hour(s)).

## 2022-10-16 NOTE — PLAN OF CARE
A&Ox4. Particular with cares. VSS on RA. Softer BP's at baseline. Abdominal pain controlled with PRN morphine, available anytime. Declined offer for Tylenol. Administering PRN simethicone for gas pain. SBA to BR. Needs a lot of encouragement to get OOB and not mobilizing much. Tolerating Regular diet. Continent of urine. Patient complaining of constipation, but declining some scheduled bowel medications. Chest port heparin locked. PleurX drain covered with dressing and CDI. Patient refusing bed alarm, but call light appropriate. Discharge pending placement. Will pursue hospice at discharge.

## 2022-10-16 NOTE — PLAN OF CARE
Goal Outcome Evaluation:           [id number]:  A&Ox4, very sad and tearful at times when talking about prognosis. VSS; RA.  SBA; declines bed alarm, but calls appropriately.  Ativan x 1 given this shift.  Pleurex drain clamped and covered, no drainage noted on dressing.  Denied the need for pain meds.  Tolerating regular diet without problems; poor appetite per patient report.  Discharge to hospice facility at discharge.

## 2022-10-16 NOTE — PROGRESS NOTES
[facility]  Hospitalist Progress Note  Jena Cisneros MD  10/16/2022    Assessment & Plan   ASSESSMENT:  Nasra Ramey is a 69-year-old independent living with history of ovarian cancer with metastasis, who presents with failure to thrive, dehydration, worsening peritoneal mets, increasing ascites without any obstruction, being admitted for further treatment.     PLAN:     1.  Ovarian cancer progressive with increased abdominal pain with evidence of increased peritoneal mets and ascites.  Abdominal pressure sensation secondary to underlying cancer tumor burden   REcurrent ascites S/p pleurax cathter placement on 10/13/22   -- having diarrhea whenever she eats, likely not able to absorb foods  -- initial plans to undergo chemotherapy second line on Tuesday  -- seen by [place] [facility], felt she is not strong enough for chemotherapy  -- s/p therapeutic paracentesis today of 3.5 L, albumin infusion  -- RD consult for nutrition, albumin post fluid is 1.8  -- palliative care consult tomorrow  -- antiemetics, antidiarrheals  -- taking morphine PO PRN for pain   Again oncology seeing her today and discussed with her about her quick decline in function and poor prognosis.  And patient agreeable to discharge with hospice and Pleurx cath placement  pleurax cath placed by IR on 10/13/22   Social work referral for hospice consultation requested today  Patient and her aunt and her niece Elizabeth met with the  and made the decision to go to [facility] when bed is available  Drain Pleurax catheter PRN for comfort     Patient is complaining of constipation  Dulcolax suppository ordered today  On MiraLAX daily increased to twice a day but patient only wants to take it once a day  Also on Colace 100 mg twice daily  Lactulose added bid PRN   Pt had BM on 10/15/22    2.  D \ry eyes.    -- continue the patient on her eyedrops.  3.  History of atrial fibrillation.  -- patient wants to wean off of  "Flecanide reduced dose from 100mg BID to 50mg PO BID     4.  History of seasonal allergies.   -- continued on her Flonase and Allegra.    5.  DVT prophylaxis.   -- discontinued heparin due to drop in platelets    Disposition  -- plan is for her to go to discharge to [facility]  when bed is available  -- anticipate discharge in the next 1-2days when a bed becomes available at [facility]      CODE STATUS:  DNR/DNI     Discussed with bedside RN and patient today    Jena Cisneros MD   Pager [phone](7AM-6PM)      Interval History      Patient resting comfortably in bed.  C/o abdominal pressure from underlying cancer discussed with patient.   No other acute issues since yesterday     -Data reviewed today: I reviewed all new labs and imaging over the last 24 hours. I personally reviewed no images or EKG's today.    Physical Exam    , Blood pressure 97/68, pulse 92, temperature 98.7  F (37.1  C), temperature source Axillary, resp. rate 15, height 1.778 m (5' 10\"), weight 61.9 kg (136 lb 6.4 oz), SpO2 95 %, not currently breastfeeding.  Vitals:    10/12/22 0634 10/15/22 0700 10/16/22 0645   Weight: 61.7 kg (136 lb 1.6 oz) 62.3 kg (137 lb 4.8 oz) 61.9 kg (136 lb 6.4 oz)     Vital Signs with Ranges  Temp:  [98.7  F (37.1  C)-99  F (37.2  C)] 98.7  F (37.1  C)  Pulse:  [85-94] 92  Resp:  [15-16] 15  BP: ()/(58-69) 97/68  SpO2:  [95 %-96 %] 95 %  I/O's Last 24 hours  I/O last 3 completed shifts:  In: -   Out: 1200 [Drains:1200]    Constitutional: Awake, alert, cooperative, no apparent distress  Respiratory: Clear to auscultation bilaterally, no crackles or wheezing  Cardiovascular: Regular rate and rhythm, normal S1 and S2   GI: Normal bowel sounds, soft, mildly distended, diffusely-tender  Skin/Integumen: No rashes, no cyanosis, No  edema  Other:      Medications   All medications were reviewed.      carboxymethylcellulose PF  1 drop Both Eyes QPM     docusate sodium  100 mg Oral BID     flecainide  50 mg Oral Q12H [facility] " (08/20)     heparin  5-10 mL Intracatheter Q28 Days     heparin lock flush  5-10 mL Intracatheter Q24H     polyethylene glycol  17 g Oral BID     sodium chloride (PF)  10-20 mL Intracatheter Q28 Days     sodium chloride (PF)  10-20 mL Intracatheter Q28 Days     Vitamin D3  25 mcg Oral Once per day on Sun Tue Thu Sat        Data   Recent Labs   Lab 10/10/22  0558   WBC 9.4   HGB 10.0*   MCV 96          No results found for this or any previous visit (from the past 24 hour(s)).

## 2022-10-17 NOTE — PROGRESS NOTES
[facility]  Hospitalist Progress Note  Jena Cisneros MD  10/17/2022    Assessment & Plan   ASSESSMENT:  Nasra Ramey is a 69-year-old independent living with history of ovarian cancer with metastasis, who presents with failure to thrive, dehydration, worsening peritoneal mets, increasing ascites without any obstruction, being admitted for further treatment.     PLAN:     1.  Ovarian cancer progressive with increased abdominal pain with evidence of increased peritoneal mets and ascites.  Abdominal pressure sensation secondary to underlying cancer tumor burden   REcurrent ascites S/p pleurax cathter placement on 10/13/22   -- having diarrhea whenever she eats, likely not able to absorb foods  -- initial plans to undergo chemotherapy second line on Tuesday  -- seen by [place] [facility], felt she is not strong enough for chemotherapy  -- s/p therapeutic paracentesis today of 3.5 L, albumin infusion  -- RD consult for nutrition, albumin post fluid is 1.8  -- antiemetics, antidiarrheals  -- taking morphine PO PRN for pain   Again oncology seeing her today and discussed with her about her quick decline in function and poor prognosis.  And patient agreeable to discharge with hospice and Pleurx cath placement  pleurax cath placed by IR on 10/13/22   Social work referral for hospice consultation requested   Patient and  her niece Elizabeth met with the  and made the decision to go to [facility] hospice when bed is available  Drain Pleurax catheter PRN for comfort     Patient is complaining of constipation  Dulcolax suppository ordered today  On MiraLAX daily increased to twice a day but patient only wants to take it once a day  Also on Colace 100 mg twice daily  Lactulose added bid PRN   Pt had BM on 10/15/22    2.  Dry eyes.    -- continue the patient on her eyedrops.    3.  History of atrial fibrillation.  -- patient wants to wean off of Flecanide reduced dose from 100mg BID to 50mg PO BID  "    4.  History of seasonal allergies.   -- continued on her Flonase and Allegra.    5.  DVT prophylaxis.   -- discontinued heparin due to drop in platelets    Disposition  -- plan is for her to go to discharge to [facility]  when bed is available  -- anticipate discharge in the next 1-2days when a bed becomes available at [facility]      CODE STATUS:  DNR/DNI     Discussed with bedside RN and patient today    Jena Cisneros MD   Pager [phone](7AM-6PM)      Interval History      Patient resting comfortably in bed.  C/o abdominal pressure from underlying cancer discussed with patient.   No other acute issues since yesterday     -Data reviewed today: I reviewed all new labs and imaging over the last 24 hours. I personally reviewed no images or EKG's today.    Physical Exam    , Blood pressure 95/81, pulse 83, temperature 98.4  F (36.9  C), temperature source Axillary, resp. rate 16, height 1.778 m (5' 10\"), weight 61.4 kg (135 lb 4.8 oz), SpO2 98 %, not currently breastfeeding.  Vitals:    10/15/22 0700 10/16/22 0645 10/17/22 0234   Weight: 62.3 kg (137 lb 4.8 oz) 61.9 kg (136 lb 6.4 oz) 61.4 kg (135 lb 4.8 oz)     Vital Signs with Ranges  Temp:  [98.4  F (36.9  C)-99.6  F (37.6  C)] 98.4  F (36.9  C)  Pulse:  [83-90] 83  Resp:  [16] 16  BP: ()/(65-81) 95/81  SpO2:  [94 %-98 %] 98 %  I/O's Last 24 hours  I/O last 3 completed shifts:  In: -   Out: 1100 [Drains:1100]    Constitutional: Awake, alert, cooperative, no apparent distress  Respiratory: Clear to auscultation bilaterally, no crackles or wheezing  Cardiovascular: Regular rate and rhythm, normal S1 and S2   GI: Normal bowel sounds, soft, mildly distended, diffusely-tender  Skin/Integumen: No rashes, no cyanosis, No  edema  Other:      Medications   All medications were reviewed.      carboxymethylcellulose PF  1 drop Both Eyes QPM     docusate sodium  100 mg Oral BID     flecainide  50 mg Oral Q12H [facility] (08/20)     heparin  5-10 mL Intracatheter Q28 Days "     heparin lock flush  5-10 mL Intracatheter Q24H     polyethylene glycol  17 g Oral BID     sodium chloride (PF)  10-20 mL Intracatheter Q28 Days     sodium chloride (PF)  10-20 mL Intracatheter Q28 Days     [START ON 10/18/2022] Vitamin D3  50 mcg Oral Daily        Data   No lab results found in last 7 days.    Invalid input(s): TROP, TROPONINIES    No results found for this or any previous visit (from the past 24 hour(s)).

## 2022-10-17 NOTE — PROGRESS NOTES
Care Management Follow Up    Length of Stay (days): 9    Expected Discharge Date: 10/17/2022     Concerns to be Addressed: discharge planning, other (see comments) (Hospice)     Patient plan of care discussed at interdisciplinary rounds: Yes    Anticipated Discharge Disposition: Hospice     Anticipated Discharge Services: None  Anticipated Discharge DME: None    Patient/family educated on Medicare website which has current facility and service quality ratings: yes  Education Provided on the Discharge Plan:    Patient/Family in Agreement with the Plan: yes    Referrals Placed by CM/SW:    Private pay costs discussed: Not applicable    Additional Information:  Writer called [facility] to check on bed availability. [facility] states [facility] do not have a bed until possibly tomorrow. SW to call back this afternoon to check on the status of the bed availability. Writer updated patients sanjeev Johnson via voicemail.      DESMOND Duran

## 2022-10-17 NOTE — PLAN OF CARE
Goal Outcome Evaluation:               A&Ox4, feels sad, fatigued  SBA; declines bed alarm, but calls appropriately.   Pleurex drain clamped, dressing CDI.  Uncomfortable with abdominal pressure at times, declined pain med need. Zofran x 1 for mild nausea, regular diet poor appetite per patient report. [facility] tomorrow, patient updated.

## 2022-10-17 NOTE — PLAN OF CARE
Goal Outcome Evaluation: 1930-0700    A&O x4. SBA. VSS on RA. PleurX cath drained at start of shift, 1100 ml out. Dressing changed, skin around site CDI. Heat pack given for mild abdominal pain after, and has denied pain since.  Ativan given x2 to help with sleep per pt request. Passing flatus, refused scheduled Colace and Miralax.  ODT Zofran given x1. Up to bedside commode x1. Standing weight obtained, decreased by 1/2 kg.  Port heparin locked, positive blood return this am. Offered to change port dressing, pt declined.     Discharge pending on bed availability at [facility].

## 2022-10-18 NOTE — PROGRESS NOTES
Care Management Follow Up    Length of Stay (days): 10    Expected Discharge Date: 10/19/2022     Concerns to be Addressed: discharge planning, other (see comments) (Hospice)     Patient plan of care discussed at interdisciplinary rounds: Yes    Anticipated Discharge Disposition: Hospice     Anticipated Discharge Services: None  Anticipated Discharge DME: None    Patient/family educated on Medicare website which has current facility and service quality ratings: yes  Education Provided on the Discharge Plan:    Patient/Family in Agreement with the Plan: yes    Referrals Placed by CM/SW:    Private pay costs discussed: private room/amenity fees and transportation costs    Additional Information:  Patient has been accepted at [facility] for admission tomorrow at 11am. Writer called patients Matt Johnson with an update. Writer let Elizabeth know that  has a call out to [facility] to see if they can admit at 11am tomorrow. Patient would like Elizabeth to transfer her to [facility] and Elizabeth in agreement. Elizabeth will be at the hospital at 9am to start packing her items and getting things set so they can discharge at 11 to [facility].     LOUISE will continue to follow    Addendum: [facility] can accept and will be at [facility] tomorrow at 1300. Pt needs a new COVID Swab and resulted before discharge. 3 days of comfort care medications need to be sent at discharge. MD Paged.       DESMOND Duran

## 2022-10-18 NOTE — PROGRESS NOTES
A&Ox4. Sad about needing hospice and tiring so easily. Pleurx drained x 1 at 1930- 1000 ml out. Complained of gas pain, relieved with Simethicone. Mild nausea, zofran with relief. Later nausea returned, , compazine po given at 1945. Regular diet, poor appetite. SBA; no bed alarm per patient request, patient calls appropriately. Discharge to [facility] anticipated for tomorrow.

## 2022-10-18 NOTE — PLAN OF CARE
Physical Therapy Discharge Summary    Reason for therapy discharge:    No further expectations of functional progress. Patient transition to hospice, no further IP PT needs.    Progress towards therapy goal(s). See goals on Care Plan in [facility] electronic health record for goal details.  Goals not met.  Barriers to achieving goals:   limited tolerance for therapy.    Therapy recommendation(s):    No further therapy is recommended.

## 2022-10-18 NOTE — PROGRESS NOTES
[facility]  Hospitalist Progress Note  Jena Cisneros MD  10/18/2022    Assessment & Plan   ASSESSMENT:  Nasra Ramey is a 69-year-old independent living with history of ovarian cancer with metastasis, who presents with failure to thrive, dehydration, worsening peritoneal mets, increasing ascites without any obstruction, being admitted for further treatment.     PLAN:     1.  Ovarian cancer progressive with increased abdominal pain with evidence of increased peritoneal mets and ascites.  Abdominal pressure sensation secondary to underlying cancer tumor burden   REcurrent ascites S/p pleurax cathter placement on 10/13/22   -- having diarrhea whenever she eats, likely not able to absorb foods  -- initial plans to undergo chemotherapy second line on Tuesday  -- seen by [place] [facility], felt she is not strong enough for chemotherapy  -- s/p therapeutic paracentesis today of 3.5 L, albumin infusion  -- RD consult for nutrition, albumin post fluid is 1.8  -- antiemetics, antidiarrheals  -- taking morphine PO PRN for pain   Again oncology seeing her today and discussed with her about her quick decline in function and poor prognosis.  And patient agreeable to discharge with hospice and Pleurx cath placement  pleurax cath placed by IR on 10/13/22   Social work referral for hospice consultation requested   Patient and  her niece Elizabeth met with the  and made the decision to go to [facility] hospice when bed is available  Drain Pleurax catheter PRN for comfort     Patient is complaining of constipation  Dulcolax suppository ordered today  On MiraLAX daily increased to twice a day but patient only wants to take it once a day  Also on Colace 100 mg twice daily  Lactulose added bid PRN   Pt is having BMs now     2.  Dry eyes.    -- continue the patient on her eyedrops.    3.  History of atrial fibrillation.  -- patient wants to wean off of Flecanide reduced dose from 100mg BID to 50mg PO BID  "    4.  History of seasonal allergies.   -- continued on her Flonase and Allegra.    5.  DVT prophylaxis.   -- discontinued heparin due to drop in platelets    Vitamin D deficiency;  Patient requested her vitamin dosage to be continued during this hospitalization so order placed    Disposition  -- plan is for her to go to discharge to [facility]  when bed is available  -- anticipate discharge in the next 1-2days when a bed becomes available at [facility]      CODE STATUS:  DNR/DNI     Discussed with bedside RN and patient today    Jena Cisneros MD   Pager [phone](7AM-6PM)      Interval History      Patient resting comfortably in bed.  Patient had a good bowel movement today.  Abdominal pain is reasonably controlled.  No other acute issues since yesterday     -Data reviewed today: I reviewed all new labs and imaging over the last 24 hours. I personally reviewed no images or EKG's today.    Physical Exam    , Blood pressure 102/62, pulse 92, temperature 97.3  F (36.3  C), temperature source Axillary, resp. rate 16, height 1.778 m (5' 10\"), weight 61.3 kg (135 lb 3.2 oz), SpO2 94 %, not currently breastfeeding.  Vitals:    10/16/22 0645 10/17/22 0234 10/18/22 0637   Weight: 61.9 kg (136 lb 6.4 oz) 61.4 kg (135 lb 4.8 oz) 61.3 kg (135 lb 3.2 oz)     Vital Signs with Ranges  Temp:  [97.3  F (36.3  C)-98  F (36.7  C)] 97.3  F (36.3  C)  Pulse:  [85-92] 92  Resp:  [16] 16  BP: ()/(58-62) 102/62  SpO2:  [94 %-96 %] 94 %  I/O's Last 24 hours  I/O last 3 completed shifts:  In: -   Out: 800 [Drains:800]    Constitutional: Awake, alert, cooperative, no apparent distress  Respiratory: Clear to auscultation bilaterally, no crackles or wheezing  Cardiovascular: Regular rate and rhythm, normal S1 and S2   GI: Normal bowel sounds, soft, mildly distended, diffusely-tender  Skin/Integumen: No rashes, no cyanosis, No  edema  Other:      Medications   All medications were reviewed.      carboxymethylcellulose PF  1 drop Both " Eyes QPM     docusate sodium  100 mg Oral BID     flecainide  50 mg Oral Q12H [facility] (08/20)     heparin  5-10 mL Intracatheter Q28 Days     heparin lock flush  5-10 mL Intracatheter Q24H     polyethylene glycol  17 g Oral BID     sodium chloride (PF)  10-20 mL Intracatheter Q28 Days     sodium chloride (PF)  10-20 mL Intracatheter Q28 Days     Vitamin D3  50 mcg Oral Daily        Data   No lab results found in last 7 days.    Invalid input(s): TROP, TROPONINIES    No results found for this or any previous visit (from the past 24 hour(s)).

## 2022-10-18 NOTE — CONSULTS
[facility]    Progress Note - [facility]    ______________________________________________________________________    [facility] 24/7 Contact Number: [phone]    - Providers: Please contact [facility] with changes in orders or clinical plan of care   - Nursing: Please contact [facility] with significant changes in patient condition  ______________________________________________________________________        Plan of Care Discussed with the Following:   LOUISE Martin    Pt accepted by [facility]. [facility] will be at [facility] tomorrow around 1pm.          Shruthi Rasmussen, RN  Clinical Hospice Nurse Liaison  [facility]  Cell [phone] call or text

## 2022-10-18 NOTE — PLAN OF CARE
Goal Outcome Evaluation:         A&Ox4; sad/tearful, PRN Ativan given x 1 per patient request.  Patient complained about abdominal fullness; drained approximately 800mL fluid from the Pleurex drain last night; clamped and redressed.  Complained of gas pain; PRN Simethicone given with good relief.  Regular diet ordered; poor appetite per patient report.  No IV access; port de-accessed yesterday.  SBA; no bed alarm on per patient request, patient calls appropriately.  Discharge pending to [facility] on hospice once bed is available.

## 2022-10-19 NOTE — PLAN OF CARE
Goal Outcome Evaluation:    A&Ox4.  Sad and tearful this shift.  Ativan given x 2 per patient request.  Complained of slight R shoulder and abdominal pain, requested Tylenol and heat packs; effective.  No IV access.  Regular diet ordered but very poor appetite; small, frequent intake encouraged.  SBA; no bed alarm set per patient request, calls appropriately.  Pleurex drain clamped, no need for draining overnight.  Discharge to hospice at [facility] probably today.

## 2022-10-19 NOTE — PROGRESS NOTES
Care Management Discharge Note    Discharge Date: 10/19/2022       Discharge Disposition: Hospice    Discharge Services: None    Discharge DME: None    Discharge Transportation: Family    Private pay costs discussed: Not applicable    PAS Confirmation Code:  NA  Patient/family educated on Medicare website which has current facility and service quality ratings: yes    Education Provided on the Discharge Plan:    Persons Notified of Discharge Plans: Patient and family  Patient/Family in Agreement with the Plan: yes    Handoff Referral Completed: Yes    Additional Information:  Patient will be discharging today at 11am via family transport to [facility]. Orders and COVID Swab faxed.         DESMOND Duran

## 2022-10-19 NOTE — DISCHARGE SUMMARY
[facility]    Discharge Summary  Hospitalist    Date of Admission:  10/8/2022  Date of Discharge:  10/19/2022 11:17 AM  Discharging Provider: Lawson Dickey MD  Date of Service (when I saw the patient): 10/19/22      History of Present Illness   ASSESSMENT:  Nasra Ramey is a 69-year-old independent living with history of ovarian cancer with metastasis, who presents with failure to thrive, dehydration, worsening peritoneal mets, increasing ascites     Hospital Course   Yaneli Ramey was admitted on 10/8/2022.  The following problems were addressed during her hospitalization:    1.  Ovarian cancer progressive with increased abdominal pain with evidence of increased peritoneal mets and ascites.  Abdominal pressure sensation secondary to underlying cancer tumor burden   REcurrent ascites S/p pleurax cathter placement on 10/13/22   -- seen by [facility], felt she is not strong enough for chemotherapy  -- s/p therapeutic paracentesis of 3.5 L, albumin infusion  -- RD consult for nutrition, albumin post fluid is 1.8  -- antiemetics, antidiarrheals  -- taking morphine PO PRN for pain   Seen by Oncology discussed with her about her quick decline in function and poor prognosis.  And patient agreeable to discharge with hospice and Pleurx cath placement  pleurax cath placed by IR on 10/13/22   Patient and her niece Elizabeth met with the  and made the decision to go to [facility] with [facility]. Discharged today 10/19/2022, see below.     Patient is complaining of constipation  Dulcolax suppository; MiraLAX daily; Colace 100 mg twice daily     2.  Dry eyes.    -- continue the patient on her eyedrops.     3.  History of atrial fibrillation.  -- patient wants to wean off of Flecanide; reduced dose from 100mg BID to 50mg PO BID; discontinued on discharge to [facility] consistent with hospice and [facility] to discharge off restorative medications; discussed with  Patient and Niece 10/19/2022 prior to discharge and transition to [facility]     4.  History of seasonal allergies.   -- continued on her Flonase and Allegra.     5. Vitamin D deficiency;  Continue PTA vit D    6. Severe Protein-Calorie Malnutrition  Signs/Symptoms:  Poor oral intake  Decreased appetite   % Weight Loss:  Unable to fully assess at this time due to fluid status with ascites   % Intake:  </= 50% for >/= 1 month (severe malnutrition)  Subcutaneous Fat Loss:  Orbital region moderate depletion and Upper arm region moderate depletion  Muscle Loss:  Temporal region moderate depletion, Clavicle bone region severe depletion, Acromion bone region severe depletion and Dorsal hand region moderate-severe depletion  Labs:  Protein total 5.1  Albumin 2.2  Nutritional Consult; nutritional supplements     Code Status   DNR / DNI       Primary Care Physician   [facility]    Physical Exam   Temp: 98  F (36.7  C) Temp src: Axillary BP: 96/59 Pulse: 92   Resp: 16 SpO2: 95 % O2 Device: None (Room air)    Vitals:    10/16/22 0645 10/17/22 0234 10/18/22 0637   Weight: 61.9 kg (136 lb 6.4 oz) 61.4 kg (135 lb 4.8 oz) 61.3 kg (135 lb 3.2 oz)     Constitutional:  NAD, alert, cooperative; frail  Respiratory:      Respirations nonlabored room air  Cardiovascular:  No murmur appreciated  GI:  Soft, nontender, drain in place; dressed.       Discharge Disposition   Admited to hospice care at [facility] with  Hospice.  Discharged to home  Condition at discharge: Terminal    Consultations This Hospital Stay   PHYSICAL THERAPY ADULT IP CONSULT  OCCUPATIONAL THERAPY ADULT IP CONSULT  NUTRITION SERVICES ADULT IP CONSULT  GYNECOLOGIC ONCOLOGY IP CONSULT  HEMATOLOGY & ONCOLOGY IP CONSULT  PALLIATIVE CARE ADULT IP CONSULT  SOCIAL WORK IP CONSULT  CARE MANAGEMENT / SOCIAL WORK IP CONSULT  PHYSICAL THERAPY ADULT IP CONSULT  CARE MANAGEMENT / SOCIAL WORK IP CONSULT  INTERVENTIONAL RADIOLOGY ADULT/PEDS IP  CONSULT  INTERVENTIONAL RADIOLOGY ADULT/PEDS IP CONSULT    Time Spent on this Encounter   I, Lawson Dickey MD, personally saw the patient today and spent greater than 30 minutes discharging this patient discussing discharge plans with Patient, Niece, Nursing and SW, coordinating discharge and transition to [facility], completing discharge orders and medications.    Discharge Orders      Reason for your hospital stay    Presented with increased ovarian metastatic disease with increased ascites.  Okay to admit to [facility] for hospice     Follow-up and recommended labs and tests     Okay to admit to [facility]  with  Hospice     Activity    Your activity upon discharge: activity as tolerated with assistance     Diet    Follow this diet upon discharge:       Snacks/Supplements Adult: Other; Patient may order supplements PRN (RD); With Meals      Regular Diet Adult     Discharge Medications   Discharge Medication List as of 10/19/2022 10:55 AM      START taking these medications    Details   LORazepam (ATIVAN) 0.5 MG tablet Take 1 tablet (0.5 mg) by mouth every 6 hours as needed for anxiety, muscle spasms, nausea or sleep, Disp-10 tablet, R-0, [facility] will resume RX mgmt and any RFs      morphine 10 MG/5ML solution Take 1.25-2.5 mLs (2.5-5 mg) by mouth every 6 hours as needed for moderate to severe pain, Disp-15 mL, R-0, [facility] will resume mgmt and any RFs      ondansetron (ZOFRAN ODT) 4 MG ODT tab Take 1 tablet (4 mg) by mouth every 6 hours as needed for nausea or vomiting (Nausea and Vomiting), Disp-10 tablet, R-0, [facility] Hospice will resume RX mgmt and any RFs      prochlorperazine (COMPAZINE) 5 MG tablet Take 1 tablet (5 mg) by mouth every 6 hours as needed for vomiting, Disp-10 tablet, R-0, E-PrescribeOngoing Rx mgmt and RF per  Hospice.         CONTINUE these medications which have NOT CHANGED    Details   alpha-d-galactosidase (BEANO) tablet Take 1 tablet by mouth  daily as needed for intestinal gas, Historical      B Complex-C (VITAMIN B COMPLEX W/VITAMIN C) TABS tablet Take 1 tablet by mouth four times a week Monday, Wednesday, Friday, Saturday, Historical      Carboxymethylcell-Hypromellose (GENTEAL) 0.25-0.3 % GEL Place 1 drop into both eyes every evening as needed, Historical      carboxymethylcellulose PF (REFRESH PLUS) 0.5 % ophthalmic solution Place 1 drop into both eyes every evening, Historical      Carboxymethylcellulose Sodium (THERATEARS) 0.25 % SOLN Place 1 drop into both eyes 4 times daily as needed, Historical      docusate sodium (COLACE) 100 MG capsule Take 100-200 mg by mouth daily as needed for constipation, Historical      fexofenadine (ALLEGRA) 180 MG tablet Take 180 mg by mouth daily as needed for allergies, Historical      fluticasone (FLONASE) 50 MCG/ACT nasal spray Spray 1 spray into both nostrils daily as needed, Historical      lactase (LACTAID) 3000 UNIT tablet Take 3,000 Units by mouth daily as needed for indigestion, Historical      lidocaine-prilocaine (EMLA) 2.5-2.5 % external cream Apply topically as needed (1 HOUR PRIOR TO PORT DRAINS/INFUSION)Historical      methylcellulose (CITRUCEL) powder Take 1.5 teaspoonful by mouth daily as needed, Historical      multivitamin, therapeutic (THERA-VIT) TABS tablet Take 1 tablet by mouth three times a week Tuesday, Thursday, Sunday, Historical      OVER-THE-COUNTER Place 1 drop into both eyes daily as needed Medication Name: Hylo-Forte Oph Soln, Historical      polyethylene glycol (MIRALAX) 17 GM/Dose powder Take 1 capful by mouth daily as needed, Historical      polyethylene glycol-propylene glycol PF (SYSTANE HYDRATION PF) 0.4-0.3 % SOLN opthalmic solution Place 1 drop into both eyes 4 times daily as needed for dry eyes, Historical      Probiotic Product (PROBIOTIC DAILY PO) Take 1 capsule by mouth daily as needed, Historical      simethicone (MYLICON) 125 MG chewable tablet Take 125 mg by mouth daily  as needed for intestinal gas, Historical      Vitamin D (Cholecalciferol) 25 MCG (1000 UT) TABS Take 1 tablet by mouth four times a week Monday, Wednesday, Friday, Saturday, Historical         STOP taking these medications       clindamycin (CLEOCIN T) 1 % external solution Comments:   Reason for Stopping:         flecainide (TAMBOCOR) 50 MG tablet Comments:   Reason for Stopping:             Allergies   Allergies   Allergen Reactions     Adhesive Tape      Cromolyn      Estradiol      Hydrocodone Other (See Comments)     Other Drug Allergy (See Comments)      Percodan       Paclitaxel      Percocet [Oxycodone-Acetaminophen]      Septra [Sulfamethoxazole W-Trimethoprim]      Data   Most Recent 3 CBC's:Recent Labs   Lab Test 10/10/22  0558 10/08/22  1530 10/06/22  2000   WBC 9.4 10.4 10.2   HGB 10.0* 11.8 11.4*   MCV 96 93 93    458* 392      Most Recent 3 BMP's:  Recent Labs   Lab Test 10/09/22  0916 10/08/22  1530 10/06/22  2000    135 136   POTASSIUM 4.3 4.2 4.6   CHLORIDE 105 101 99   CO2 24 27 25   BUN 18 21 24.4*   CR 0.83 0.92 1.26*   ANIONGAP 8 7 12   HOLLY 7.6* 8.5 8.6*   * 105* 111*     Most Recent 2 LFT's:  Recent Labs   Lab Test 10/09/22  0916 10/08/22  1530   AST 19 22   ALT 18 25   ALKPHOS 36* 45   BILITOTAL 0.4 0.4

## 2022-11-21 PROBLEM — C57.00: Status: RESOLVED | Noted: 2022-01-01 | Resolved: 2022-11-21

## 2022-11-21 PROBLEM — M62.89 PELVIC FLOOR DYSFUNCTION: Status: RESOLVED | Noted: 2022-01-01 | Resolved: 2022-11-21

## 2023-10-03 NOTE — TELEPHONE ENCOUNTER
3/14/22 Incoming records from [facility] dated 3/8/22 sent to HIM for scanning  [facility]  
fall

## 2024-05-23 NOTE — TELEPHONE ENCOUNTER
Too many questions to answer via email...  Please schedule LYNDSEY appointment, any LYNDSEY, not urgent.  DI   Yes

## 2024-06-04 NOTE — PROVIDER NOTIFICATION
MD Notification    Notified Person: MD    Notified Person Name:    Notification Date/Time: 10/19/2022 0900    Notification Interaction: nehemiah    Purpose of Notification: Good morning! I know youre busy but pt has ride set up to d/c at 11. Pharm cant send meds up without dc orders. can you put those in? Thanks!       Orders Received:    Comments:      
MD Notification    Notified Person: MD    Notified Person Name: Dr. Middleton    Notification Date/Time: 10/9/22 940    Notification Interaction: vocera message    Purpose of Notification: Patient has two orders for continuous fluids, /hr and D5 0.9% NaCL at 100/hr do you want them both?    Orders Received: Discontinue the LR.    Comments:  
MD Notification    Notified Person: MD Dr. Cisneros    Notified Person Name: Dr. Cisneros    Notification Date/Time: 10/15/22 11:17 AM     Notification Interaction: telephone with read back     Purpose of Notification: Order to drain PleurX     Orders Received: Drain PleurX PRN up to 4 L for patient comfort, daily.     Comments:    
[] : negative straight leg raise

## (undated) DEVICE — KIT PATIENT POSITIONING PIGAZZI LATEX FREE 40580

## (undated) DEVICE — NDL SPINAL 22GA 3.5" QUINCKE 405181

## (undated) DEVICE — SPONGE RAY-TEC 4X4" 7317

## (undated) DEVICE — DRSG STERI STRIP 1X5" R1548

## (undated) DEVICE — SOL NACL 0.9% INJ 1000ML BAG 2B1324X

## (undated) DEVICE — SUCTION CANISTER MEDIVAC LINER 3000ML W/LID 65651-530

## (undated) DEVICE — DAVINCI XI SEAL UNIVERSAL 5-8MM 470361

## (undated) DEVICE — SOL NACL 0.9% IRRIG 1000ML BOTTLE 2F7124

## (undated) DEVICE — SU MONOCRYL 4-0 PS-2 18" UND Y496G

## (undated) DEVICE — SOL WATER IRRIG 1000ML BOTTLE 2F7114

## (undated) DEVICE — SUCTION IRR STRYKERFLOW II W/TIP 250-070-520

## (undated) DEVICE — SU VICRYL 0 UR-6 27" J603H

## (undated) DEVICE — BARRIER INTERCEED 5X6" 4350XL

## (undated) DEVICE — SPONGE KITTNER 31001010

## (undated) DEVICE — PACK DAVINCI GYN SMA15GDFS1

## (undated) DEVICE — GLOVE PROTEXIS MICRO 6.0  2D73PM60

## (undated) DEVICE — DAVINCI XI DRAPE ARM 470015

## (undated) DEVICE — ENDO TROCAR FIRST ENTRY KII FIOS Z-THRD 05X100MM CTF03

## (undated) DEVICE — DEVICE SUTURE PASSER 14GA WECK EFX EFXSP2

## (undated) DEVICE — SPONGE LAP 18X18" X8435

## (undated) DEVICE — GLOVE BIOGEL PI ULTRATOUCH G SZ 6.5 42165

## (undated) DEVICE — DAVINCI HOT SHEARS TIP COVER  400180

## (undated) DEVICE — DAVINCI XI DRAPE COLUMN 470341

## (undated) DEVICE — NDL INSUFFLATION 13GA 120MM C2201

## (undated) DEVICE — DRAPE CV SPLIT II 147X106" 9158

## (undated) DEVICE — GLOVE PROTEXIS BLUE W/NEU-THERA 6.0  2D73EB60

## (undated) DEVICE — ESU GROUND PAD UNIVERSAL W/O CORD

## (undated) DEVICE — SU PDS II 0 CT-2 27" Z334H

## (undated) DEVICE — DAVINCI XI OBTURATOR BLADELESS 8MM 470359

## (undated) DEVICE — BLADE KNIFE SURG 10 371110

## (undated) DEVICE — LINEN TOWEL PACK X5 5464

## (undated) DEVICE — DRAPE MAYO STAND 23X54 8337

## (undated) DEVICE — ESU LIGASURE LAPAROSCOPIC BLUNT TIP SEALER 5MMX37CM LF1837

## (undated) DEVICE — ENDO SCOPE WARMER LF TM500

## (undated) DEVICE — DAVINCI XI HANDPIECE ESU VESSEL SEALER 8MM EXT 480422

## (undated) DEVICE — POUCH TISSUE RETRIEVAL ROBOTIC 8MM 5.1" INTRO TRS-ROBO-8

## (undated) DEVICE — GLOVE PROTEXIS W/NEU-THERA 6.5  2D73TE65

## (undated) DEVICE — ENDO TROCAR CONMED AIRSEAL BLADELESS 08X120MM IAS8-120LP

## (undated) DEVICE — ENDO TROCAR SLEEVE KII Z-THREADED 05X100MM CTS02

## (undated) DEVICE — TUBING CONMED AIRSEAL SMOKE EVAC INSUFFLATION ASM-EVAC

## (undated) DEVICE — Device

## (undated) DEVICE — SYR 10ML FINGER CONTROL W/O NDL 309695

## (undated) RX ORDER — DEXAMETHASONE SODIUM PHOSPHATE 4 MG/ML
INJECTION, SOLUTION INTRA-ARTICULAR; INTRALESIONAL; INTRAMUSCULAR; INTRAVENOUS; SOFT TISSUE
Status: DISPENSED
Start: 2022-01-01

## (undated) RX ORDER — FENTANYL CITRATE 50 UG/ML
INJECTION, SOLUTION INTRAMUSCULAR; INTRAVENOUS
Status: DISPENSED
Start: 2022-01-01

## (undated) RX ORDER — LIDOCAINE HYDROCHLORIDE 10 MG/ML
INJECTION, SOLUTION EPIDURAL; INFILTRATION; INTRACAUDAL; PERINEURAL
Status: DISPENSED
Start: 2021-01-01

## (undated) RX ORDER — KETOROLAC TROMETHAMINE 30 MG/ML
INJECTION, SOLUTION INTRAMUSCULAR; INTRAVENOUS
Status: DISPENSED
Start: 2021-01-01

## (undated) RX ORDER — PROPOFOL 10 MG/ML
INJECTION, EMULSION INTRAVENOUS
Status: DISPENSED
Start: 2021-01-01

## (undated) RX ORDER — ISOSULFAN BLUE 50 MG/5ML
INJECTION, SOLUTION SUBCUTANEOUS
Status: DISPENSED
Start: 2021-01-01

## (undated) RX ORDER — DEXAMETHASONE SODIUM PHOSPHATE 4 MG/ML
INJECTION, SOLUTION INTRA-ARTICULAR; INTRALESIONAL; INTRAMUSCULAR; INTRAVENOUS; SOFT TISSUE
Status: DISPENSED
Start: 2021-01-01

## (undated) RX ORDER — LIDOCAINE HYDROCHLORIDE 10 MG/ML
INJECTION, SOLUTION INFILTRATION; PERINEURAL
Status: DISPENSED
Start: 2021-01-01

## (undated) RX ORDER — FENTANYL CITRATE 50 UG/ML
INJECTION, SOLUTION INTRAMUSCULAR; INTRAVENOUS
Status: DISPENSED
Start: 2021-01-01

## (undated) RX ORDER — ONDANSETRON 2 MG/ML
INJECTION INTRAMUSCULAR; INTRAVENOUS
Status: DISPENSED
Start: 2022-01-01

## (undated) RX ORDER — BUPIVACAINE HYDROCHLORIDE 2.5 MG/ML
INJECTION, SOLUTION EPIDURAL; INFILTRATION; INTRACAUDAL
Status: DISPENSED
Start: 2021-01-01

## (undated) RX ORDER — LIDOCAINE HYDROCHLORIDE 20 MG/ML
INJECTION, SOLUTION EPIDURAL; INFILTRATION; INTRACAUDAL; PERINEURAL
Status: DISPENSED
Start: 2021-01-01

## (undated) RX ORDER — ACETAMINOPHEN 325 MG/1
TABLET ORAL
Status: DISPENSED
Start: 2021-01-01

## (undated) RX ORDER — HEPARIN SODIUM (PORCINE) LOCK FLUSH IV SOLN 100 UNIT/ML 100 UNIT/ML
SOLUTION INTRAVENOUS
Status: DISPENSED
Start: 2021-01-01

## (undated) RX ORDER — ONDANSETRON 2 MG/ML
INJECTION INTRAMUSCULAR; INTRAVENOUS
Status: DISPENSED
Start: 2021-01-01

## (undated) RX ORDER — HEPARIN SODIUM 1000 [USP'U]/ML
INJECTION, SOLUTION INTRAVENOUS; SUBCUTANEOUS
Status: DISPENSED
Start: 2021-01-01

## (undated) RX ORDER — PROPOFOL 10 MG/ML
INJECTION, EMULSION INTRAVENOUS
Status: DISPENSED
Start: 2022-01-01

## (undated) RX ORDER — HYDROMORPHONE HYDROCHLORIDE 1 MG/ML
INJECTION, SOLUTION INTRAMUSCULAR; INTRAVENOUS; SUBCUTANEOUS
Status: DISPENSED
Start: 2022-01-01

## (undated) RX ORDER — FENTANYL CITRATE 0.05 MG/ML
INJECTION, SOLUTION INTRAMUSCULAR; INTRAVENOUS
Status: DISPENSED
Start: 2022-01-01

## (undated) RX ORDER — INDOCYANINE GREEN AND WATER 25 MG
KIT INJECTION
Status: DISPENSED
Start: 2022-01-01

## (undated) RX ORDER — HEPARIN SODIUM (PORCINE) LOCK FLUSH IV SOLN 100 UNIT/ML 100 UNIT/ML
SOLUTION INTRAVENOUS
Status: DISPENSED
Start: 2022-01-01

## (undated) RX ORDER — ALBUTEROL SULFATE 90 UG/1
AEROSOL, METERED RESPIRATORY (INHALATION)
Status: DISPENSED
Start: 2021-01-01

## (undated) RX ORDER — BUPIVACAINE HYDROCHLORIDE AND EPINEPHRINE 5; 5 MG/ML; UG/ML
INJECTION, SOLUTION EPIDURAL; INTRACAUDAL; PERINEURAL
Status: DISPENSED
Start: 2022-01-01

## (undated) RX ORDER — CEFAZOLIN SODIUM 2 G/100ML
INJECTION, SOLUTION INTRAVENOUS
Status: DISPENSED
Start: 2021-01-01